# Patient Record
Sex: FEMALE | Race: WHITE | NOT HISPANIC OR LATINO | Employment: FULL TIME | URBAN - METROPOLITAN AREA
[De-identification: names, ages, dates, MRNs, and addresses within clinical notes are randomized per-mention and may not be internally consistent; named-entity substitution may affect disease eponyms.]

---

## 2017-04-11 ENCOUNTER — HOSPITAL ENCOUNTER (OUTPATIENT)
Dept: OTHER | Facility: HOSPITAL | Age: 22
Discharge: HOME | End: 2017-04-11
Attending: PHYSICIAN ASSISTANT | Admitting: PHYSICIAN ASSISTANT

## 2017-04-11 LAB
ALBUMIN SERPL-MCNC: 3.3 G/DL (ref 3.5–4.8)
ALBUMIN/GLOB SERPL: 1.1 {RATIO} (ref 1–1.7)
ALP SERPL-CCNC: 61 IU/L (ref 32–91)
ALT SERPL-CCNC: 36 IU/L (ref 14–54)
ANION GAP SERPL CALC-SCNC: 12.5 MMOL/L (ref 10–20)
AST SERPL-CCNC: 22 IU/L (ref 15–41)
BASOPHILS # BLD AUTO: 0.1 10*3/UL (ref 0–0.2)
BASOPHILS NFR BLD AUTO: 1 % (ref 0–2)
BILIRUB SERPL-MCNC: 0.4 MG/DL (ref 0.3–1.2)
BUN SERPL-MCNC: 11 MG/DL (ref 8–20)
BUN/CREAT SERPL: 22 (ref 5.4–26.2)
CALCIUM SERPL-MCNC: 9.6 MG/DL (ref 8.9–10.3)
CHLORIDE SERPL-SCNC: 106 MMOL/L (ref 101–111)
CHOLEST SERPL-MCNC: 155 MG/DL
CHOLEST/HDLC SERPL: 3.9 {RATIO}
CONV CO2: 26 MMOL/L (ref 22–32)
CONV LDL CHOLESTEROL DIRECT: 96 MG/DL (ref 0–100)
CONV TOTAL PROTEIN: 6.4 G/DL (ref 6.1–7.9)
CREAT UR-MCNC: 0.5 MG/DL (ref 0.4–1)
DIFFERENTIAL METHOD BLD: (no result)
EOSINOPHIL # BLD AUTO: 0.1 10*3/UL (ref 0–0.3)
EOSINOPHIL # BLD AUTO: 1 % (ref 0–3)
ERYTHROCYTE [DISTWIDTH] IN BLOOD BY AUTOMATED COUNT: 14.1 % (ref 11.5–14.5)
GLOBULIN UR ELPH-MCNC: 3.1 G/DL (ref 2.5–3.8)
GLUCOSE SERPL-MCNC: 88 MG/DL (ref 65–99)
HCT VFR BLD AUTO: 38.4 % (ref 35–49)
HDLC SERPL-MCNC: 40 MG/DL
HGB BLD-MCNC: 13 G/DL (ref 12–15)
LDLC/HDLC SERPL: 2.4 {RATIO}
LIPID INTERPRETATION: ABNORMAL
LYMPHOCYTES # BLD AUTO: 4.4 10*3/UL (ref 0.8–4.8)
LYMPHOCYTES NFR BLD AUTO: 35 % (ref 18–42)
MCH RBC QN AUTO: 27.5 PG (ref 26–32)
MCHC RBC AUTO-ENTMCNC: 33.9 G/DL (ref 32–36)
MCV RBC AUTO: 81 FL (ref 80–94)
MONOCYTES # BLD AUTO: 0.7 10*3/UL (ref 0.1–1.3)
MONOCYTES NFR BLD AUTO: 6 % (ref 2–11)
NEUTROPHILS # BLD AUTO: 7.2 10*3/UL (ref 2.3–8.6)
NEUTROPHILS NFR BLD AUTO: 57 % (ref 50–75)
NRBC BLD AUTO-RTO: 0 /100{WBCS}
NRBC/RBC NFR BLD MANUAL: 0 10*3/UL
PLATELET # BLD AUTO: 306 10*3/UL (ref 150–450)
PMV BLD AUTO: 9.2 FL (ref 7.4–10.4)
POTASSIUM SERPL-SCNC: 4.5 MMOL/L (ref 3.6–5.1)
RBC # BLD AUTO: 4.74 10*6/UL (ref 4–5.4)
SODIUM SERPL-SCNC: 140 MMOL/L (ref 136–144)
TRIGL SERPL-MCNC: 154 MG/DL
TSH SERPL-ACNC: 1.35 UIU/ML (ref 0.34–5.6)
VLDLC SERPL CALC-MCNC: 18.7 MG/DL
WBC # BLD AUTO: 12.6 10*3/UL (ref 4.5–11.5)

## 2017-04-12 ENCOUNTER — HOSPITAL ENCOUNTER (OUTPATIENT)
Dept: PREADMISSION TESTING | Facility: HOSPITAL | Age: 22
Discharge: HOME | End: 2017-04-12
Attending: PHYSICIAN ASSISTANT | Admitting: PHYSICIAN ASSISTANT

## 2018-01-04 ENCOUNTER — HOSPITAL ENCOUNTER (OUTPATIENT)
Dept: FAMILY MEDICINE CLINIC | Facility: CLINIC | Age: 23
Setting detail: SPECIMEN
Discharge: HOME | End: 2018-01-04
Attending: PHYSICIAN ASSISTANT | Admitting: PHYSICIAN ASSISTANT

## 2018-01-04 LAB
ALBUMIN SERPL-MCNC: 3.5 G/DL (ref 3.5–4.8)
ALBUMIN/GLOB SERPL: 1.2 {RATIO} (ref 1–1.7)
ALP SERPL-CCNC: 61 IU/L (ref 32–91)
ALT SERPL-CCNC: 52 IU/L (ref 14–54)
ANION GAP SERPL CALC-SCNC: 11.9 MMOL/L (ref 10–20)
AST SERPL-CCNC: 29 IU/L (ref 15–41)
BILIRUB SERPL-MCNC: 0.2 MG/DL (ref 0.3–1.2)
BUN SERPL-MCNC: 9 MG/DL (ref 8–20)
BUN/CREAT SERPL: 12.9 (ref 5.4–26.2)
CALCIUM SERPL-MCNC: 9.2 MG/DL (ref 8.9–10.3)
CHLORIDE SERPL-SCNC: 103 MMOL/L (ref 101–111)
CHOLEST SERPL-MCNC: 157 MG/DL
CHOLEST/HDLC SERPL: 3.8 {RATIO}
CONV CO2: 26 MMOL/L (ref 22–32)
CONV LDL CHOLESTEROL DIRECT: 94 MG/DL (ref 0–100)
CONV TOTAL PROTEIN: 6.5 G/DL (ref 6.1–7.9)
CREAT UR-MCNC: 0.7 MG/DL (ref 0.4–1)
GLOBULIN UR ELPH-MCNC: 3 G/DL (ref 2.5–3.8)
GLUCOSE SERPL-MCNC: 110 MG/DL (ref 65–99)
HCG UR QL: NEGATIVE
HDLC SERPL-MCNC: 42 MG/DL
LDLC/HDLC SERPL: 2.3 {RATIO}
LIPID INTERPRETATION: ABNORMAL
POTASSIUM SERPL-SCNC: 3.9 MMOL/L (ref 3.6–5.1)
SODIUM SERPL-SCNC: 137 MMOL/L (ref 136–144)
TRIGL SERPL-MCNC: 274 MG/DL
VLDLC SERPL CALC-MCNC: 20.8 MG/DL

## 2018-04-24 ENCOUNTER — HOSPITAL ENCOUNTER (OUTPATIENT)
Dept: FAMILY MEDICINE CLINIC | Facility: CLINIC | Age: 23
Setting detail: SPECIMEN
Discharge: HOME | End: 2018-04-24
Attending: PHYSICIAN ASSISTANT | Admitting: PHYSICIAN ASSISTANT

## 2018-04-25 LAB
BACTERIA SPEC AEROBE CULT: NORMAL
Lab: NORMAL
MICRO REPORT STATUS: NORMAL
SPECIMEN SOURCE: NORMAL

## 2019-07-15 ENCOUNTER — HOSPITAL ENCOUNTER (EMERGENCY)
Facility: HOSPITAL | Age: 24
Discharge: HOME | End: 2019-07-15
Attending: NURSE PRACTITIONER | Admitting: EMERGENCY MEDICINE

## 2019-07-15 VITALS
TEMPERATURE: 98.6 F | RESPIRATION RATE: 18 BRPM | HEART RATE: 98 BPM | BODY MASS INDEX: 44.41 KG/M2 | DIASTOLIC BLOOD PRESSURE: 97 MMHG | HEIGHT: 68 IN | WEIGHT: 293 LBS | OXYGEN SATURATION: 98 % | SYSTOLIC BLOOD PRESSURE: 144 MMHG

## 2019-07-15 DIAGNOSIS — H60.12 CELLULITIS OF ANTIHELIX OF LEFT EAR: ICD-10-CM

## 2019-07-15 DIAGNOSIS — H92.02 OTALGIA OF LEFT EAR: ICD-10-CM

## 2019-07-15 DIAGNOSIS — H60.312 DIFFUSE OTITIS EXTERNA OF LEFT EAR, UNSPECIFIED CHRONICITY: Primary | ICD-10-CM

## 2019-07-15 PROCEDURE — 99283 EMERGENCY DEPT VISIT LOW MDM: CPT

## 2019-07-15 RX ORDER — CEPHALEXIN 500 MG/1
500 CAPSULE ORAL 3 TIMES DAILY
Qty: 30 CAPSULE | Refills: 0 | OUTPATIENT
Start: 2019-07-15 | End: 2019-07-17

## 2019-07-15 RX ORDER — AMOXICILLIN 875 MG/1
875 TABLET, COATED ORAL 2 TIMES DAILY
COMMUNITY
End: 2019-07-15

## 2019-07-15 RX ORDER — TOBRAMYCIN AND DEXAMETHASONE 3; 1 MG/ML; MG/ML
2 SUSPENSION/ DROPS OPHTHALMIC
Status: DISCONTINUED | OUTPATIENT
Start: 2019-07-15 | End: 2019-07-16 | Stop reason: HOSPADM

## 2019-07-15 RX ORDER — NEOMYCIN SULFATE, POLYMYXIN B SULFATE AND BACITRACIN ZINC 3.5; 10000; 4 MG/G; [USP'U]/G; [USP'U]/G
OINTMENT OPHTHALMIC 4 TIMES DAILY
COMMUNITY
End: 2019-07-15

## 2019-07-15 RX ORDER — CIPROFLOXACIN AND DEXAMETHASONE 3; 1 MG/ML; MG/ML
4 SUSPENSION/ DROPS AURICULAR (OTIC) 2 TIMES DAILY
Status: DISCONTINUED | OUTPATIENT
Start: 2019-07-15 | End: 2019-07-15

## 2019-07-15 RX ORDER — ACETAMINOPHEN AND CODEINE PHOSPHATE 300; 30 MG/1; MG/1
1 TABLET ORAL EVERY 4 HOURS PRN
Qty: 10 TABLET | Refills: 0 | OUTPATIENT
Start: 2019-07-15 | End: 2019-07-17

## 2019-07-15 RX ADMIN — TOBRAMYCIN AND DEXAMETHASONE 2 DROP: 3; 1 SUSPENSION/ DROPS OPHTHALMIC at 23:07

## 2019-07-17 ENCOUNTER — HOSPITAL ENCOUNTER (EMERGENCY)
Facility: HOSPITAL | Age: 24
Discharge: HOME | End: 2019-07-17
Admitting: EMERGENCY MEDICINE

## 2019-07-17 VITALS
RESPIRATION RATE: 18 BRPM | HEIGHT: 68 IN | HEART RATE: 92 BPM | WEIGHT: 293 LBS | DIASTOLIC BLOOD PRESSURE: 80 MMHG | OXYGEN SATURATION: 97 % | SYSTOLIC BLOOD PRESSURE: 123 MMHG | BODY MASS INDEX: 44.41 KG/M2 | TEMPERATURE: 99 F

## 2019-07-17 DIAGNOSIS — H60.502 ACUTE OTITIS EXTERNA OF LEFT EAR, UNSPECIFIED TYPE: ICD-10-CM

## 2019-07-17 DIAGNOSIS — H66.92 ACUTE LEFT OTITIS MEDIA: Primary | ICD-10-CM

## 2019-07-17 PROCEDURE — 25010000002 CEFTRIAXONE PER 250 MG: Performed by: NURSE PRACTITIONER

## 2019-07-17 PROCEDURE — 96372 THER/PROPH/DIAG INJ SC/IM: CPT

## 2019-07-17 PROCEDURE — 99283 EMERGENCY DEPT VISIT LOW MDM: CPT

## 2019-07-17 RX ORDER — CEFTRIAXONE 1 G/1
1000 INJECTION, POWDER, FOR SOLUTION INTRAMUSCULAR; INTRAVENOUS ONCE
Status: COMPLETED | OUTPATIENT
Start: 2019-07-17 | End: 2019-07-17

## 2019-07-17 RX ORDER — LIDOCAINE HYDROCHLORIDE 10 MG/ML
2.1 INJECTION, SOLUTION EPIDURAL; INFILTRATION; INTRACAUDAL; PERINEURAL ONCE
Status: COMPLETED | OUTPATIENT
Start: 2019-07-17 | End: 2019-07-17

## 2019-07-17 RX ORDER — TRAMADOL HYDROCHLORIDE 50 MG/1
50 TABLET ORAL EVERY 4 HOURS PRN
Qty: 8 TABLET | Refills: 0 | OUTPATIENT
Start: 2019-07-17 | End: 2020-11-05

## 2019-07-17 RX ORDER — AMOXICILLIN AND CLAVULANATE POTASSIUM 875; 125 MG/1; MG/1
1 TABLET, FILM COATED ORAL 2 TIMES DAILY
Qty: 20 TABLET | Refills: 0 | OUTPATIENT
Start: 2019-07-17 | End: 2020-11-05

## 2019-07-17 RX ADMIN — CEFTRIAXONE SODIUM 1000 MG: 1 INJECTION, POWDER, FOR SOLUTION INTRAMUSCULAR; INTRAVENOUS at 13:50

## 2019-07-17 RX ADMIN — LIDOCAINE HYDROCHLORIDE 2 ML: 10 INJECTION, SOLUTION EPIDURAL; INFILTRATION; INTRACAUDAL; PERINEURAL at 13:50

## 2019-07-26 ENCOUNTER — OFFICE VISIT (OUTPATIENT)
Dept: FAMILY MEDICINE CLINIC | Facility: CLINIC | Age: 24
End: 2019-07-26

## 2019-07-26 ENCOUNTER — TELEPHONE (OUTPATIENT)
Dept: FAMILY MEDICINE CLINIC | Facility: CLINIC | Age: 24
End: 2019-07-26

## 2019-07-26 ENCOUNTER — APPOINTMENT (OUTPATIENT)
Dept: LAB | Facility: HOSPITAL | Age: 24
End: 2019-07-26

## 2019-07-26 VITALS
OXYGEN SATURATION: 97 % | WEIGHT: 293 LBS | TEMPERATURE: 97.4 F | SYSTOLIC BLOOD PRESSURE: 135 MMHG | BODY MASS INDEX: 44.41 KG/M2 | DIASTOLIC BLOOD PRESSURE: 84 MMHG | RESPIRATION RATE: 20 BRPM | HEIGHT: 68 IN | HEART RATE: 88 BPM

## 2019-07-26 DIAGNOSIS — N93.9 ABNORMAL VAGINAL BLEEDING: ICD-10-CM

## 2019-07-26 DIAGNOSIS — H60.92 OTITIS EXTERNA OF LEFT EAR, UNSPECIFIED CHRONICITY, UNSPECIFIED TYPE: ICD-10-CM

## 2019-07-26 DIAGNOSIS — N63.0 LUMP IN FEMALE BREAST: Primary | ICD-10-CM

## 2019-07-26 LAB — B-HCG UR QL: NEGATIVE

## 2019-07-26 PROCEDURE — 81025 URINE PREGNANCY TEST: CPT | Performed by: NURSE PRACTITIONER

## 2019-07-26 PROCEDURE — 99213 OFFICE O/P EST LOW 20 MIN: CPT | Performed by: NURSE PRACTITIONER

## 2019-07-26 NOTE — PROGRESS NOTES
"Mariano Ash is a 24 y.o. female.     Chief Complaint   Patient presents with   • Breast Mass     Found lump in left breast.   • Vaginal Bleeding     Says \"pink\"  vaginal bleeding. Off period 2 wks ago. Unusual for her to have add'l period. last time was pregnant.        HPI  Found breast lump left over one week ago. Never had this before. No redness to the area.   Has PCOS and had period 2 weeks ago and now having vaginal bleeding. She did not do a pregnancy test. Its very hard for her to get pregnant. She is having unprotected sex.   Wants ear reevaluated recently treated with antibioitics.     The following portions of the patient's history were reviewed and updated as appropriate: allergies, current medications, past family history, past medical history, past social history, past surgical history and problem list.      Current Outpatient Medications:   •  amoxicillin-clavulanate (AUGMENTIN) 875-125 MG per tablet, Take 1 tablet by mouth 2 (Two) Times a Day., Disp: 20 tablet, Rfl: 0  •  neomycin-polymyxin-hydrocortisone (CORTISPORIN) 3.5-76001-9 otic solution, Administer 3 drops into the left ear 4 (Four) Times a Day., Disp: 10 mL, Rfl: 0  •  traMADol (ULTRAM) 50 MG tablet, Take 1 tablet by mouth Every 4 (Four) Hours As Needed for Moderate Pain ., Disp: 8 tablet, Rfl: 0    Recent Results (from the past 4032 hour(s))   POCT URINALYSIS DIPSTICK, AUTOMATED    Collection Time: 03/26/19 11:22 AM   Result Value Ref Range    Color, UA      Appearance, Fluid  Clear, Slightly Cloudy    Specific Gravity, UA 1.015 1.000 - 1.030    pH, UA 5 5 - 8    Leukocytes, UA 75 Karen/ul (A) Negative    Nitrite, UA Negative Negative    Total Protein, urine Trace Negative, Trace    Glucose Normal Normal    External Ketones, Urine Negative Negative    Urobilinogen, UA Normal Normal mg/dL    External Bilirubin, Urine Negative Negative    Blood, UA Negative Negative    WBC, UA  0 - 3 hpf    RBC, UA  0 - 2 hpf    Epithelial " "Casts      Bacteria, UA  None         Review of Systems   Constitutional: Negative for chills and fever.   HENT: Positive for ear pain. Negative for congestion and sinus pressure.         Recently treated with antibioitics for exertnal OM and inner ear infection   Eyes: Negative for blurred vision and pain.   Respiratory: Negative for cough and shortness of breath.    Cardiovascular: Negative for chest pain and leg swelling.   Gastrointestinal: Negative for abdominal pain, nausea and indigestion.   Endocrine: Negative for cold intolerance, heat intolerance, polydipsia, polyphagia and polyuria.   Genitourinary: Positive for breast lump.   Skin: Negative for dry skin, rash and bruise.   Psychiatric/Behavioral: Negative for dysphoric mood and stress.       Objective     /84 (BP Location: Left arm, Patient Position: Sitting, Cuff Size: Large Adult)   Pulse 88   Temp 97.4 °F (36.3 °C) (Oral)   Resp 20   Ht 172.7 cm (68\")   Wt (!) 146 kg (320 lb 14.4 oz)   LMP  (LMP Unknown) Comment: periods irregular from pcos  SpO2 97%   BMI 48.79 kg/m²     Physical Exam   Constitutional: She is oriented to person, place, and time. She appears well-developed and well-nourished.   HENT:   Head: Normocephalic and atraumatic.   Right Ear: Hearing, tympanic membrane, external ear and ear canal normal.   Left Ear: Hearing and tympanic membrane normal. There is tenderness.   Eyes: Conjunctivae and EOM are normal. Pupils are equal, round, and reactive to light.   Neck: Normal range of motion. Neck supple.   Cardiovascular: Normal rate, regular rhythm, normal heart sounds and intact distal pulses.   Pulmonary/Chest: Effort normal and breath sounds normal.   Abdominal: Soft. Bowel sounds are normal.   Musculoskeletal: Normal range of motion.   Neurological: She is alert and oriented to person, place, and time.   Skin: Skin is warm and dry.        Psychiatric: She has a normal mood and affect. Her behavior is normal.   Nursing note " and vitals reviewed.        Assessment/Plan   Brandi was seen today for breast mass and vaginal bleeding.    Diagnoses and all orders for this visit:    Lump in female breast  Comments:  left breast 10 oclock palpable mass non painful  Orders:  -     Cancel: Mammo Diagnostic Digital Tomosynthesis Left With CAD; Future  -     Mammo Diagnostic Digital Tomosynthesis Left With CAD    Abnormal vaginal bleeding  -     POCT pregnancy, urine    Otitis externa of left ear, unspecified chronicity, unspecified type      Patient Instructions   Follow up after testing  Continue ear drops   Schedule the mammogram and ultrasound if needed.       Jade Whitlock, APRN    07/26/19

## 2019-08-02 ENCOUNTER — HOSPITAL ENCOUNTER (OUTPATIENT)
Dept: MAMMOGRAPHY | Facility: HOSPITAL | Age: 24
End: 2019-08-02

## 2019-08-02 ENCOUNTER — DOCUMENTATION (OUTPATIENT)
Dept: FAMILY MEDICINE CLINIC | Facility: CLINIC | Age: 24
End: 2019-08-02

## 2019-08-02 ENCOUNTER — HOSPITAL ENCOUNTER (OUTPATIENT)
Dept: ULTRASOUND IMAGING | Facility: HOSPITAL | Age: 24
Discharge: HOME | End: 2019-08-02
Admitting: NURSE PRACTITIONER

## 2019-08-02 DIAGNOSIS — N63.0 LUMP IN FEMALE BREAST: ICD-10-CM

## 2019-08-02 PROCEDURE — 76642 ULTRASOUND BREAST LIMITED: CPT

## 2019-10-27 PROCEDURE — 99283 EMERGENCY DEPT VISIT LOW MDM: CPT

## 2019-10-28 ENCOUNTER — HOSPITAL ENCOUNTER (EMERGENCY)
Facility: HOSPITAL | Age: 24
Discharge: HOME | End: 2019-10-28
Attending: EMERGENCY MEDICINE | Admitting: EMERGENCY MEDICINE

## 2019-10-28 VITALS
RESPIRATION RATE: 18 BRPM | DIASTOLIC BLOOD PRESSURE: 88 MMHG | BODY MASS INDEX: 44.41 KG/M2 | WEIGHT: 293 LBS | SYSTOLIC BLOOD PRESSURE: 132 MMHG | HEART RATE: 88 BPM | TEMPERATURE: 97.8 F | HEIGHT: 68 IN | OXYGEN SATURATION: 97 %

## 2019-10-28 DIAGNOSIS — R10.2 PELVIC PAIN: ICD-10-CM

## 2019-10-28 DIAGNOSIS — N93.9 VAGINAL BLEEDING: Primary | ICD-10-CM

## 2019-10-28 LAB
BASOPHILS # BLD AUTO: 0.2 10*3/MM3 (ref 0–0.2)
BASOPHILS NFR BLD AUTO: 1.3 % (ref 0–1.5)
DEPRECATED RDW RBC AUTO: 42 FL (ref 37–54)
EOSINOPHIL # BLD AUTO: 0.1 10*3/MM3 (ref 0–0.4)
EOSINOPHIL NFR BLD AUTO: 0.9 % (ref 0.3–6.2)
ERYTHROCYTE [DISTWIDTH] IN BLOOD BY AUTOMATED COUNT: 14.3 % (ref 12.3–15.4)
HCG INTACT+B SERPL-ACNC: <0.5 MIU/ML
HCT VFR BLD AUTO: 40.8 % (ref 34–46.6)
HGB BLD-MCNC: 13.6 G/DL (ref 12–15.9)
LYMPHOCYTES # BLD AUTO: 6.1 10*3/MM3 (ref 0.7–3.1)
LYMPHOCYTES NFR BLD AUTO: 38.8 % (ref 19.6–45.3)
MCH RBC QN AUTO: 27.8 PG (ref 26.6–33)
MCHC RBC AUTO-ENTMCNC: 33.4 G/DL (ref 31.5–35.7)
MCV RBC AUTO: 83.3 FL (ref 79–97)
MONOCYTES # BLD AUTO: 0.9 10*3/MM3 (ref 0.1–0.9)
MONOCYTES NFR BLD AUTO: 5.8 % (ref 5–12)
NEUTROPHILS # BLD AUTO: 8.4 10*3/MM3 (ref 1.7–7)
NEUTROPHILS NFR BLD AUTO: 53.2 % (ref 42.7–76)
NRBC BLD AUTO-RTO: 0 /100 WBC (ref 0–0.2)
PLATELET # BLD AUTO: 324 10*3/MM3 (ref 140–450)
PMV BLD AUTO: 8.7 FL (ref 6–12)
RBC # BLD AUTO: 4.9 10*6/MM3 (ref 3.77–5.28)
WBC NRBC COR # BLD: 15.8 10*3/MM3 (ref 3.4–10.8)

## 2019-10-28 PROCEDURE — 84702 CHORIONIC GONADOTROPIN TEST: CPT | Performed by: EMERGENCY MEDICINE

## 2019-10-28 PROCEDURE — 85025 COMPLETE CBC W/AUTO DIFF WBC: CPT | Performed by: EMERGENCY MEDICINE

## 2019-10-28 RX ORDER — NAPROXEN 375 MG/1
375 TABLET ORAL
Qty: 15 TABLET | Refills: 0 | OUTPATIENT
Start: 2019-10-28 | End: 2020-11-05

## 2020-07-08 ENCOUNTER — TELEPHONE (OUTPATIENT)
Dept: FAMILY MEDICINE CLINIC | Facility: CLINIC | Age: 25
End: 2020-07-08

## 2020-07-08 NOTE — TELEPHONE ENCOUNTER
The patient would like a referral to see a psychologist. She believes she needs some medication to help with issues she is having but refuses to go to an inpatient facility.

## 2020-07-09 NOTE — TELEPHONE ENCOUNTER
She needs appointment to be seen. She can check her insurance to see who is covered. For immediate care go to [facility]  She can go to [facility] or [facility] as well.

## 2020-11-05 ENCOUNTER — HOSPITAL ENCOUNTER (EMERGENCY)
Facility: HOSPITAL | Age: 25
Discharge: HOME | End: 2020-11-05
Admitting: EMERGENCY MEDICINE

## 2020-11-05 ENCOUNTER — APPOINTMENT (OUTPATIENT)
Dept: GENERAL RADIOLOGY | Facility: HOSPITAL | Age: 25
End: 2020-11-05

## 2020-11-05 VITALS
BODY MASS INDEX: 44.41 KG/M2 | DIASTOLIC BLOOD PRESSURE: 81 MMHG | HEART RATE: 89 BPM | RESPIRATION RATE: 16 BRPM | TEMPERATURE: 98 F | WEIGHT: 293 LBS | SYSTOLIC BLOOD PRESSURE: 137 MMHG | OXYGEN SATURATION: 99 % | HEIGHT: 68 IN

## 2020-11-05 DIAGNOSIS — M25.571 ACUTE RIGHT ANKLE PAIN: ICD-10-CM

## 2020-11-05 DIAGNOSIS — S93.401A SPRAIN OF RIGHT ANKLE, UNSPECIFIED LIGAMENT, INITIAL ENCOUNTER: Primary | ICD-10-CM

## 2020-11-05 DIAGNOSIS — S90.511A ABRASION OF RIGHT ANKLE, INITIAL ENCOUNTER: ICD-10-CM

## 2020-11-05 PROCEDURE — 73630 X-RAY EXAM OF FOOT: CPT

## 2020-11-05 PROCEDURE — 99283 EMERGENCY DEPT VISIT LOW MDM: CPT

## 2020-11-05 PROCEDURE — 73610 X-RAY EXAM OF ANKLE: CPT

## 2020-11-05 RX ORDER — ACETAMINOPHEN 500 MG
1000 TABLET ORAL ONCE
Status: COMPLETED | OUTPATIENT
Start: 2020-11-05 | End: 2020-11-05

## 2020-11-05 RX ADMIN — ACETAMINOPHEN 1000 MG: 500 TABLET, FILM COATED ORAL at 14:45

## 2020-11-05 NOTE — ED NOTES
"Wound cleaned with sterile normal saline soaked 4x4s and wrapped with one kerlix and one 6\" ACE wrap.     Janes Alcala  11/05/20 4760    "

## 2020-11-05 NOTE — DISCHARGE INSTRUCTIONS
Return to the ER for any worsening symptoms.  Alternate ice and heat at home with helpful to keep it elevated.  May alternate Motrin Tylenol every 4 hours for pain and may take up to a total of 4000 mg in 24 hours for Tylenol

## 2020-11-05 NOTE — ED PROVIDER NOTES
Subjective   History:  Patient is 25-year-old female presents to the ER with right ankle pain.  She reports that she was standing outside in a plastic pool and her foot went through it and she twisted her ankle.  Also reports she has some cuts on her ankle on her bottom of her foot.  She reports her tetanus is up-to-date.  She is unable to bear weight on her right ankle she reports.  Has not taken anything for it.     Onset: 1 day  Location: Ankle  Duration: Constant  Character: Sharp pain and abrasions  Aggravating/Alleviating factors: None  Radiation none  Severity: Moderate            Review of Systems   Constitutional: Negative for chills, diaphoresis, fatigue and fever.   Respiratory: Negative for cough, choking and shortness of breath.    Cardiovascular: Negative for chest pain.   Gastrointestinal: Negative for abdominal pain, nausea and vomiting.   Genitourinary: Negative.    Musculoskeletal:        Right ankle pain and abrasions   Skin: Negative.    Neurological: Negative.    Psychiatric/Behavioral: Negative.        Past Medical History:   Diagnosis Date   • PCOS (polycystic ovarian syndrome)        No Known Allergies    Past Surgical History:   Procedure Laterality Date   •  SECTION     • CYSTOSCOPY W/ LASER LITHOTRIPSY     • TONSILLECTOMY         Family History   Problem Relation Age of Onset   • Hypertension Mother        Social History     Socioeconomic History   • Marital status:      Spouse name: Not on file   • Number of children: Not on file   • Years of education: Not on file   • Highest education level: Not on file   Tobacco Use   • Smoking status: Current Every Day Smoker     Packs/day: 0.50   Substance and Sexual Activity   • Alcohol use: Yes     Alcohol/week: 3.0 standard drinks     Types: 3 Cans of beer per week   • Drug use: No   • Sexual activity: Yes     Partners: Male           Objective   Physical Exam  Vitals signs and nursing note reviewed.   Constitutional:        Appearance: She is well-developed.   HENT:      Head: Normocephalic and atraumatic.      Nose: Nose normal.   Eyes:      Pupils: Pupils are equal, round, and reactive to light.   Neck:      Musculoskeletal: Normal range of motion.   Pulmonary:      Effort: Pulmonary effort is normal.   Musculoskeletal: Normal range of motion.      Comments: Right ankle: Edematous lateral malleolus.  Increase in pain with plantar flexion and extension against resistance.  Pulses 2+.  Abrasions noted to right lateral ankle.  Abrasion also noted to right great toe plantar surface   Skin:     General: Skin is warm and dry.   Neurological:      General: No focal deficit present.      Mental Status: She is alert and oriented to person, place, and time.   Psychiatric:         Mood and Affect: Mood normal.         Behavior: Behavior normal.         Thought Content: Thought content normal.         Judgment: Judgment normal.         Procedures           ED Course      Xr Ankle 3+ View Right    Result Date: 11/5/2020  Right ankle soft tissue swelling. No acute osseous abnormalities.  Electronically Signed By-Dr. Gunjan Lancaster MD On:11/5/2020 2:29 PM This report was finalized on [id number] by Dr. Gunjan Lancaster MD.    Xr Foot 3+ View Right    Result Date: 11/5/2020  No acute right foot findings.  Electronically Signed By-Dr. Gunjan Lancaster MD On:11/5/2020 2:30 PM This report was finalized on [id number] by Dr. Gunjan Lancaster MD.    Labs Reviewed - No data to display  Medications   acetaminophen (TYLENOL) tablet 1,000 mg (1,000 mg Oral Given 11/5/20 1445)                                          MDM  Number of Diagnoses or Management Options  Abrasion of right ankle, initial encounter:   Acute right ankle pain:   Sprain of right ankle, unspecified ligament, initial encounter:   Diagnosis management comments: I examined the patient using the appropriate personal protective equipment.      DISPOSITION:   Chart Review:  Comorbidity:  has a  past medical history of PCOS (polycystic ovarian syndrome).  Differentials:this list is not all inclusive and does not constitute the entirety of considered causes --> acute fracture versus sprain    Imaging: Was interpreted by physician and reviewed by myself:  Xr Ankle 3+ View Right    Result Date: 11/5/2020  Right ankle soft tissue swelling. No acute osseous abnormalities.  Electronically Signed By-Dr. Gunjan Lancaster MD On:11/5/2020 2:29 PM This report was finalized on [id number] by Dr. Gunjan Lancaster MD.    Xr Foot 3+ View Right    Result Date: 11/5/2020  No acute right foot findings.  Electronically Signed By-Dr. Gunjan Lancaster MD On:11/5/2020 2:30 PM This report was finalized on [id number] by Dr. Gunjan Lancaster MD.      Disposition/Treatment:    Patient is a 25-year-old female presents to the ER with right ankle pain x-ray was negative.  Ankle was cleaned her abrasions were fairly shallow I do not think they would benefit from sutures.  She reports her tetanus is up-to-date she was offered crutches she was discharged home in stable condition return precautions and follow-up instructions were provided       Amount and/or Complexity of Data Reviewed  Tests in the radiology section of CPT®: reviewed    Patient Progress  Patient progress: stable      Final diagnoses:   Sprain of right ankle, unspecified ligament, initial encounter   Acute right ankle pain   Abrasion of right ankle, initial encounter            Radha Calvert PA-C  11/05/20 0297

## 2021-06-16 ENCOUNTER — APPOINTMENT (OUTPATIENT)
Dept: GENERAL RADIOLOGY | Facility: HOSPITAL | Age: 26
End: 2021-06-16

## 2021-06-16 ENCOUNTER — HOSPITAL ENCOUNTER (EMERGENCY)
Facility: HOSPITAL | Age: 26
Discharge: HOME | End: 2021-06-16
Admitting: EMERGENCY MEDICINE

## 2021-06-16 VITALS
DIASTOLIC BLOOD PRESSURE: 94 MMHG | HEIGHT: 68 IN | TEMPERATURE: 98.5 F | HEART RATE: 94 BPM | WEIGHT: 293 LBS | BODY MASS INDEX: 44.41 KG/M2 | OXYGEN SATURATION: 97 % | RESPIRATION RATE: 18 BRPM | SYSTOLIC BLOOD PRESSURE: 137 MMHG

## 2021-06-16 DIAGNOSIS — M54.32 SCIATICA OF LEFT SIDE: Primary | ICD-10-CM

## 2021-06-16 PROCEDURE — 96372 THER/PROPH/DIAG INJ SC/IM: CPT

## 2021-06-16 PROCEDURE — 72110 X-RAY EXAM L-2 SPINE 4/>VWS: CPT

## 2021-06-16 PROCEDURE — 99283 EMERGENCY DEPT VISIT LOW MDM: CPT

## 2021-06-16 PROCEDURE — 25010000002 KETOROLAC TROMETHAMINE PER 15 MG: Performed by: NURSE PRACTITIONER

## 2021-06-16 RX ORDER — KETOROLAC TROMETHAMINE 30 MG/ML
60 INJECTION, SOLUTION INTRAMUSCULAR; INTRAVENOUS ONCE
Status: COMPLETED | OUTPATIENT
Start: 2021-06-16 | End: 2021-06-16

## 2021-06-16 RX ORDER — CYCLOBENZAPRINE HCL 10 MG
10 TABLET ORAL ONCE
Status: COMPLETED | OUTPATIENT
Start: 2021-06-16 | End: 2021-06-16

## 2021-06-16 RX ORDER — CYCLOBENZAPRINE HCL 10 MG
10 TABLET ORAL 3 TIMES DAILY PRN
Qty: 15 TABLET | Refills: 0 | Status: SHIPPED | OUTPATIENT
Start: 2021-06-16 | End: 2021-09-14

## 2021-06-16 RX ORDER — METHYLPREDNISOLONE 4 MG/1
TABLET ORAL
Qty: 21 TABLET | Refills: 0 | Status: SHIPPED | OUTPATIENT
Start: 2021-06-16 | End: 2021-09-14

## 2021-06-16 RX ADMIN — KETOROLAC TROMETHAMINE 60 MG: 30 INJECTION, SOLUTION INTRAMUSCULAR at 13:05

## 2021-06-16 RX ADMIN — CYCLOBENZAPRINE HYDROCHLORIDE 10 MG: 10 TABLET, FILM COATED ORAL at 13:05

## 2021-06-16 NOTE — DISCHARGE INSTRUCTIONS
Take your steroid pack as ordered and to completion.  Use the Flexeril 3 times daily as needed for back pain.  Return to the emergency department for any new or worsening symptoms.  Call your primary care doctor today to schedule a follow-up and possible referral for physical therapy.

## 2021-06-16 NOTE — ED NOTES
Pt states she has had lower back pain radiating down her left leg since Friday. Pt denies any injury. No numbness or tingling in leg. No loss of bowel or bladder control. Pt is able to ambulate but states it hurts. Pt has never been told she has sciatica.      Laurita Wise, RN  06/16/21 3851

## 2021-09-10 ENCOUNTER — APPOINTMENT (OUTPATIENT)
Dept: GENERAL RADIOLOGY | Facility: HOSPITAL | Age: 26
End: 2021-09-10

## 2021-09-10 ENCOUNTER — HOSPITAL ENCOUNTER (EMERGENCY)
Facility: HOSPITAL | Age: 26
Discharge: HOME | End: 2021-09-10
Admitting: EMERGENCY MEDICINE

## 2021-09-10 VITALS
HEART RATE: 87 BPM | BODY MASS INDEX: 43.4 KG/M2 | WEIGHT: 293 LBS | DIASTOLIC BLOOD PRESSURE: 87 MMHG | TEMPERATURE: 98.3 F | OXYGEN SATURATION: 99 % | SYSTOLIC BLOOD PRESSURE: 136 MMHG | RESPIRATION RATE: 15 BRPM | HEIGHT: 69 IN

## 2021-09-10 DIAGNOSIS — R06.02 SHORTNESS OF BREATH: Primary | ICD-10-CM

## 2021-09-10 LAB
ALBUMIN SERPL-MCNC: 4.2 G/DL (ref 3.5–5.2)
ALBUMIN/GLOB SERPL: 1.4 G/DL
ALP SERPL-CCNC: 68 U/L (ref 39–117)
ALT SERPL W P-5'-P-CCNC: 27 U/L (ref 1–33)
ANION GAP SERPL CALCULATED.3IONS-SCNC: 12 MMOL/L (ref 5–15)
AST SERPL-CCNC: 18 U/L (ref 1–32)
BASOPHILS # BLD AUTO: 0 10*3/MM3 (ref 0–0.2)
BASOPHILS NFR BLD AUTO: 1 % (ref 0–1.5)
BILIRUB SERPL-MCNC: 0.3 MG/DL (ref 0–1.2)
BUN SERPL-MCNC: 9 MG/DL (ref 6–20)
BUN/CREAT SERPL: 12.7 (ref 7–25)
CALCIUM SPEC-SCNC: 8.9 MG/DL (ref 8.6–10.5)
CHLORIDE SERPL-SCNC: 102 MMOL/L (ref 98–107)
CO2 SERPL-SCNC: 25 MMOL/L (ref 22–29)
CREAT SERPL-MCNC: 0.71 MG/DL (ref 0.57–1)
D DIMER PPP FEU-MCNC: 0.45 MG/L (FEU) (ref 0–0.59)
D-LACTATE SERPL-SCNC: 1.1 MMOL/L (ref 0.5–2)
DEPRECATED RDW RBC AUTO: 42.9 FL (ref 37–54)
EOSINOPHIL # BLD AUTO: 0 10*3/MM3 (ref 0–0.4)
EOSINOPHIL NFR BLD AUTO: 0.3 % (ref 0.3–6.2)
ERYTHROCYTE [DISTWIDTH] IN BLOOD BY AUTOMATED COUNT: 15.3 % (ref 12.3–15.4)
GFR SERPL CREATININE-BSD FRML MDRD: 99 ML/MIN/1.73
GLOBULIN UR ELPH-MCNC: 3.1 GM/DL
GLUCOSE SERPL-MCNC: 108 MG/DL (ref 65–99)
HCT VFR BLD AUTO: 42.4 % (ref 34–46.6)
HGB BLD-MCNC: 14.2 G/DL (ref 12–15.9)
HOLD SPECIMEN: NORMAL
HOLD SPECIMEN: NORMAL
LYMPHOCYTES # BLD AUTO: 1.6 10*3/MM3 (ref 0.7–3.1)
LYMPHOCYTES NFR BLD AUTO: 33.5 % (ref 19.6–45.3)
MCH RBC QN AUTO: 27.2 PG (ref 26.6–33)
MCHC RBC AUTO-ENTMCNC: 33.6 G/DL (ref 31.5–35.7)
MCV RBC AUTO: 80.9 FL (ref 79–97)
MONOCYTES # BLD AUTO: 0.5 10*3/MM3 (ref 0.1–0.9)
MONOCYTES NFR BLD AUTO: 11.4 % (ref 5–12)
NEUTROPHILS NFR BLD AUTO: 2.5 10*3/MM3 (ref 1.7–7)
NEUTROPHILS NFR BLD AUTO: 53.8 % (ref 42.7–76)
NRBC BLD AUTO-RTO: 0.1 /100 WBC (ref 0–0.2)
PLATELET # BLD AUTO: 246 10*3/MM3 (ref 140–450)
PMV BLD AUTO: 8.8 FL (ref 6–12)
POTASSIUM SERPL-SCNC: 4 MMOL/L (ref 3.5–5.2)
PROT SERPL-MCNC: 7.3 G/DL (ref 6–8.5)
RBC # BLD AUTO: 5.24 10*6/MM3 (ref 3.77–5.28)
SODIUM SERPL-SCNC: 139 MMOL/L (ref 136–145)
TROPONIN T SERPL-MCNC: <0.01 NG/ML (ref 0–0.03)
WBC # BLD AUTO: 4.6 10*3/MM3 (ref 3.4–10.8)
WHOLE BLOOD HOLD SPECIMEN: NORMAL

## 2021-09-10 PROCEDURE — 99283 EMERGENCY DEPT VISIT LOW MDM: CPT

## 2021-09-10 PROCEDURE — 85379 FIBRIN DEGRADATION QUANT: CPT | Performed by: NURSE PRACTITIONER

## 2021-09-10 PROCEDURE — 83605 ASSAY OF LACTIC ACID: CPT

## 2021-09-10 PROCEDURE — 85025 COMPLETE CBC W/AUTO DIFF WBC: CPT

## 2021-09-10 PROCEDURE — 84484 ASSAY OF TROPONIN QUANT: CPT | Performed by: NURSE PRACTITIONER

## 2021-09-10 PROCEDURE — 80053 COMPREHEN METABOLIC PANEL: CPT

## 2021-09-10 PROCEDURE — 93005 ELECTROCARDIOGRAM TRACING: CPT

## 2021-09-10 PROCEDURE — 87040 BLOOD CULTURE FOR BACTERIA: CPT

## 2021-09-10 PROCEDURE — 71045 X-RAY EXAM CHEST 1 VIEW: CPT

## 2021-09-10 RX ORDER — SODIUM CHLORIDE 9 MG/ML
30 INJECTION, SOLUTION INTRAVENOUS ONCE
Status: DISCONTINUED | OUTPATIENT
Start: 2021-09-10 | End: 2021-09-10

## 2021-09-10 RX ORDER — DIPHENHYDRAMINE HYDROCHLORIDE 50 MG/ML
50 INJECTION INTRAMUSCULAR; INTRAVENOUS ONCE AS NEEDED
Status: DISCONTINUED | OUTPATIENT
Start: 2021-09-10 | End: 2021-09-10

## 2021-09-10 RX ORDER — METHYLPREDNISOLONE SODIUM SUCCINATE 125 MG/2ML
125 INJECTION, POWDER, LYOPHILIZED, FOR SOLUTION INTRAMUSCULAR; INTRAVENOUS ONCE AS NEEDED
Status: DISCONTINUED | OUTPATIENT
Start: 2021-09-10 | End: 2021-09-10

## 2021-09-10 RX ORDER — EPINEPHRINE 1 MG/ML
0.3 INJECTION, SOLUTION, CONCENTRATE INTRAVENOUS ONCE AS NEEDED
Status: DISCONTINUED | OUTPATIENT
Start: 2021-09-10 | End: 2021-09-10

## 2021-09-10 RX ORDER — SODIUM CHLORIDE 0.9 % (FLUSH) 0.9 %
10 SYRINGE (ML) INJECTION AS NEEDED
Status: DISCONTINUED | OUTPATIENT
Start: 2021-09-10 | End: 2021-09-10 | Stop reason: HOSPADM

## 2021-09-10 NOTE — DISCHARGE INSTRUCTIONS
Continue and finish quarantine as instructed.  Symptomatic treatment such as Tylenol for pain or fever.  Mucinex for congestion.  Drink plenty of fluids.  Follow-up with your primary care provider.  Return for new or worsening symptoms.

## 2021-09-10 NOTE — ED PROVIDER NOTES
Subjective   Patient is a 26-year-old obese white female with history of PCOS who presents from home today with complaints of shortness of breath central chest pain and tightness with coughing.  She states she tested positive for Covid 3 days ago.  She reports a dry nonproductive cough.  She states she has had some nausea and an episode of vomiting with a few episodes of loose nonbloody stools.  She states her daughter is ill with similar symptoms as well.  Patient states her symptoms started 3 days ago the day she was tested.  States she did not receive Covid vaccine.  She denies any leg pain or swelling.  She does report a low-grade fever.          Review of Systems   Constitutional: Positive for chills and fever.   HENT: Positive for congestion.    Respiratory: Positive for cough, chest tightness and shortness of breath.    Cardiovascular: Positive for chest pain. Negative for leg swelling.   Gastrointestinal: Positive for diarrhea, nausea and vomiting. Negative for abdominal pain.   Genitourinary: Negative for decreased urine volume.   Musculoskeletal: Negative for neck pain and neck stiffness.   Skin: Negative for rash.       Past Medical History:   Diagnosis Date   • PCOS (polycystic ovarian syndrome)        No Known Allergies    Past Surgical History:   Procedure Laterality Date   •  SECTION     • CYSTOSCOPY W/ LASER LITHOTRIPSY     • TONSILLECTOMY         Family History   Problem Relation Age of Onset   • Hypertension Mother        Social History     Socioeconomic History   • Marital status:      Spouse name: Not on file   • Number of children: Not on file   • Years of education: Not on file   • Highest education level: Not on file   Tobacco Use   • Smoking status: Current Every Day Smoker     Packs/day: 0.50   Substance and Sexual Activity   • Alcohol use: Yes     Alcohol/week: 3.0 standard drinks     Types: 3 Cans of beer per week   • Drug use: No   • Sexual activity: Yes     Partners: Male            Objective   Physical Exam  Vital signs and triage nurse note reviewed.  Constitutional: Awake, alert; well-developed and well-nourished. No acute distress is noted.  Obese.  HEENT: Normocephalic, atraumatic; pupils are PERRL with intact EOM; oropharynx is pink and moist without exudate or erythema.  No drooling or pooling of oral secretions.  Neck: Supple, full range of motion without pain; no cervical lymphadenopathy. Normal phonation.  Cardiovascular: Regular rate and rhythm, normal S1-S2.  No murmur noted.  Pulmonary: Respiratory effort regular nonlabored, breath sounds clear to auscultation all fields.  Abdomen: Soft, nontender, nondistended with normoactive bowel sounds; no rebound or guarding.  Musculoskeletal: Independent range of motion of all extremities with no palpable tenderness or edema.  Neuro: Alert oriented x3, speech is clear and appropriate, GCS 15.    Skin: Flesh tone, warm, dry, intact; no erythematous or petechial rash or lesion.      Procedures           ED Course      Labs Reviewed   COMPREHENSIVE METABOLIC PANEL - Abnormal; Notable for the following components:       Result Value    Glucose 108 (*)     All other components within normal limits    Narrative:     GFR Normal >60  Chronic Kidney Disease <60  Kidney Failure <15     CBC WITH AUTO DIFFERENTIAL - Normal   TROPONIN (IN-HOUSE) - Normal    Narrative:     Troponin T Reference Range:  <= 0.03 ng/mL-   Negative for AMI  >0.03 ng/mL-     Abnormal for myocardial necrosis.  Clinicians would have to utilize clinical acumen, EKG, Troponin and serial changes to determine if it is an Acute Myocardial Infarction or myocardial injury due to an underlying chronic condition.       Results may be falsely decreased if patient taking Biotin.     D-DIMER, QUANTITATIVE - Normal    Narrative:     Reference Range  --------------------------------------------------------------------     < 0.50   Negative Predictive Value  0.50-0.59    Indeterminate    >= 0.60   Probable VTE             A very low percentage of patients with DVT may yield D-Dimer results   below the cut-off of 0.50 mg/L FEU.  This is known to be more   prevalent in patients with distal DVT.             Results of this test should always be interpreted in conjunction with   the patient's medical history, clinical presentation and other   findings.  Clinical diagnosis should not be based on the result of   INNOVANCE D-Dimer alone.   POC LACTATE - Normal   BLOOD CULTURE   BLOOD CULTURE   RAINBOW DRAW    Narrative:     The following orders were created for panel order [facility] Draw.  Procedure                               Abnormality         Status                     ---------                               -----------         ------                     Green Top (Gel)[[id number]]                                  Final result               Lavender Top[[id number]]                                     Final result               Gold Top - SST[[id number]]                                   Final result               Light Blue Top[[id number]]                                   Final result                 Please view results for these tests on the individual orders.   POC LACTATE   CBC AND DIFFERENTIAL    Narrative:     The following orders were created for panel order CBC & Differential.  Procedure                               Abnormality         Status                     ---------                               -----------         ------                     CBC Auto Differential[[id number]]        Normal              Final result                 Please view results for these tests on the individual orders.   GREEN TOP   LAVENDER TOP   GOLD TOP - SST   LIGHT BLUE TOP     XR Chest AP    Result Date: 9/10/2021  No acute cardiopulmonary abnormality.  Electronically Signed By-Jose Palumbo MD On:9/10/2021 4:50 PM This report was finalized on [id number] by  Jose Palumbo  MD.    Medications   sodium chloride 0.9 % flush 10 mL (has no administration in time range)   INV casirivimab / imdevimab 1200 mg in 60 mL NS IVPB (premix) (has no administration in time range)   EPINEPHrine PF (ADRENALIN) injection 0.3 mg (has no administration in time range)   diphenhydrAMINE (BENADRYL) injection 50 mg (has no administration in time range)   methylPREDNISolone sodium succinate (SOLU-Medrol) injection 125 mg (has no administration in time range)   sodium chloride 0.9 % infusion 30 mL (has no administration in time range)                                          MDM  Number of Diagnoses or Management Options  Shortness of breath  Diagnosis management comments: Comorbidities: PCOS  Differentials: Pneumonia, PE, bronchitis, pleurisy;this list is not all inclusive and does not constitute the entirety of considered causes  Discussion with provider:  Radiology interpretation: X-rays reviewed by me and interpreted by radiologist: As above  Lab interpretation: Labs viewed by me significant for: As above    Patient was placed on continuous cardiac monitor.  She had IV established.  She had labs, EKG chest x-ray obtained.    Work-up: CBC and metabolic panel are gross unremarkable.  Negative troponin.  D-dimer is within normal limits.  Chest x-ray shows no acute abnormality.  Lactate normal at 1.1.    On reexamination patient is resting comfortably.  She is in no distress.  She reports no new complaints.  She is well-appearing and has stable vital signs.  She is not hypoxic.  Not tachycardic.  Normotensive.  She is ambulatory without difficulty.    The FDA has authorized the emergency use of casirivimab and imdevimab, which is not an FDA approved drug. Discussions with the patient regarding the risks and benefits of casirivimab and imdevimab have occurred. The patient recognizes that this is an investigational treatment which may offer significant known and potential benefits and risks, the extent of which  "are unknown. Information on available alternative treatments and the risks and benefits of those alternatives was discussed. The patient received the \"Fact Sheet for Patients Parents and Caregivers\". All questions from the patient were answered to satisfaction. The patient has the option to accept or refuse treatment with casirivimab and imdevimab and would like to accept treatment.    Counseling regarding continued self isolation and use of infection control measures according to the CDC guidelines has occurred.      Short time after counseling the patient on Regeneron infusion, she decided she no longer wants to receive it.    Diagnosis and treatment plan discussed with patient.  Patient agreeable to plan.   I discussed findings with patient who voices understanding of discharge instructions, signs and symptoms requiring return to ED; discharged improved and in stable condition with follow up for re-evaluation.         Amount and/or Complexity of Data Reviewed  Clinical lab tests: ordered and reviewed  Tests in the radiology section of CPT®: ordered and reviewed    Patient Progress  Patient progress: stable      Final diagnoses:   Shortness of breath       ED Disposition  ED Disposition     ED Disposition Condition Comment    Discharge Stable           Israel Joseph Jr., MD  [address] [place] [address] [place] [address]  [phone]               Medication List      No changes were made to your prescriptions during this visit.          Sofie Mullins APRN  09/10/21 1712       Sofie Mullins APRN  09/10/21 1721    "

## 2021-09-14 ENCOUNTER — HOSPITAL ENCOUNTER (EMERGENCY)
Facility: HOSPITAL | Age: 26
Discharge: HOME | End: 2021-09-15
Admitting: EMERGENCY MEDICINE

## 2021-09-14 DIAGNOSIS — J40 BRONCHITIS: Primary | ICD-10-CM

## 2021-09-14 DIAGNOSIS — U07.1 COVID-19: ICD-10-CM

## 2021-09-14 PROCEDURE — 99283 EMERGENCY DEPT VISIT LOW MDM: CPT

## 2021-09-14 PROCEDURE — 63710000001 ONDANSETRON ODT 4 MG TABLET DISPERSIBLE: Performed by: NURSE PRACTITIONER

## 2021-09-14 RX ORDER — ACETAMINOPHEN 500 MG
1000 TABLET ORAL ONCE
Status: COMPLETED | OUTPATIENT
Start: 2021-09-14 | End: 2021-09-14

## 2021-09-14 RX ORDER — ONDANSETRON 4 MG/1
4 TABLET, ORALLY DISINTEGRATING ORAL ONCE
Status: COMPLETED | OUTPATIENT
Start: 2021-09-14 | End: 2021-09-14

## 2021-09-14 RX ORDER — GUAIFENESIN AND CODEINE PHOSPHATE 100; 10 MG/5ML; MG/5ML
10 SOLUTION ORAL ONCE
Status: COMPLETED | OUTPATIENT
Start: 2021-09-14 | End: 2021-09-14

## 2021-09-14 RX ADMIN — ACETAMINOPHEN 1000 MG: 500 TABLET, FILM COATED ORAL at 23:28

## 2021-09-14 RX ADMIN — ONDANSETRON 4 MG: 4 TABLET, ORALLY DISINTEGRATING ORAL at 23:30

## 2021-09-14 RX ADMIN — GUAIFENESIN AND CODEINE PHOSPHATE 10 ML: 10; 100 LIQUID ORAL at 23:29

## 2021-09-15 ENCOUNTER — APPOINTMENT (OUTPATIENT)
Dept: GENERAL RADIOLOGY | Facility: HOSPITAL | Age: 26
End: 2021-09-15

## 2021-09-15 VITALS
DIASTOLIC BLOOD PRESSURE: 78 MMHG | BODY MASS INDEX: 44.41 KG/M2 | RESPIRATION RATE: 18 BRPM | SYSTOLIC BLOOD PRESSURE: 95 MMHG | WEIGHT: 293 LBS | HEIGHT: 68 IN | HEART RATE: 102 BPM | TEMPERATURE: 99.4 F | OXYGEN SATURATION: 98 %

## 2021-09-15 LAB
BACTERIA SPEC AEROBE CULT: NORMAL
BACTERIA SPEC AEROBE CULT: NORMAL

## 2021-09-15 PROCEDURE — 71045 X-RAY EXAM CHEST 1 VIEW: CPT

## 2021-09-15 RX ORDER — BROMPHENIRAMINE MALEATE, PSEUDOEPHEDRINE HYDROCHLORIDE, AND DEXTROMETHORPHAN HYDROBROMIDE 2; 30; 10 MG/5ML; MG/5ML; MG/5ML
5 SYRUP ORAL 4 TIMES DAILY PRN
Qty: 280 ML | Refills: 0 | Status: SHIPPED | OUTPATIENT
Start: 2021-09-15 | End: 2021-09-22

## 2021-09-15 RX ORDER — METHYLPREDNISOLONE 4 MG/1
TABLET ORAL
Qty: 21 TABLET | Refills: 0 | OUTPATIENT
Start: 2021-09-15 | End: 2021-11-15

## 2021-09-15 RX ORDER — ALBUTEROL SULFATE 90 UG/1
2 AEROSOL, METERED RESPIRATORY (INHALATION) EVERY 4 HOURS PRN
Qty: 6.7 G | Refills: 0 | Status: SHIPPED | OUTPATIENT
Start: 2021-09-15 | End: 2021-09-22

## 2021-09-15 RX ORDER — ONDANSETRON 4 MG/1
4 TABLET, ORALLY DISINTEGRATING ORAL EVERY 8 HOURS PRN
Qty: 20 TABLET | Refills: 0 | Status: SHIPPED | OUTPATIENT
Start: 2021-09-15 | End: 2021-09-20

## 2021-09-15 NOTE — ED NOTES
"Pt states \"I just feel like I'm not getting any better.\"     Ariela Saucedo RN  09/14/21 1973    "

## 2021-09-15 NOTE — ED PROVIDER NOTES
Subjective   25 y/o morbidly obese COVID + female presents to ER with complaint of generalized fatigue and not feeling any better.  Patient reports nausea and cough.  Patient denies fever and states she hasn't even had to take any tylenol or ibuprofen today.  Onset: About 1 week ago  Location: Generalized fatigue  Duration: For 1 week  Character: Persistent  Aggravating/Alleviating Factors: Exerting any energy/rest  Radiation: Entire body  Severity: Severe            Review of Systems   Constitutional: Positive for activity change, appetite change and fatigue. Negative for fever.   HENT: Negative.    Eyes: Negative.    Respiratory: Positive for cough.    Gastrointestinal: Positive for nausea.   Endocrine: Negative.    Genitourinary: Negative for dysuria.   Musculoskeletal: Negative.    Skin: Negative for rash and wound.   Allergic/Immunologic: Negative.    Neurological: Positive for weakness.   Hematological: Negative.    Psychiatric/Behavioral: Negative.        Past Medical History:   Diagnosis Date   • PCOS (polycystic ovarian syndrome)        No Known Allergies    Past Surgical History:   Procedure Laterality Date   •  SECTION     • CYSTOSCOPY W/ LASER LITHOTRIPSY     • TONSILLECTOMY         Family History   Problem Relation Age of Onset   • Hypertension Mother        Social History     Socioeconomic History   • Marital status:      Spouse name: Not on file   • Number of children: Not on file   • Years of education: Not on file   • Highest education level: Not on file   Tobacco Use   • Smoking status: Former Smoker     Packs/day: 0.50   • Tobacco comment: quit 6 months ago   Vaping Use   • Vaping Use: Former   • Substances: Nicotine   Substance and Sexual Activity   • Alcohol use: Yes     Alcohol/week: 3.0 standard drinks     Types: 3 Cans of beer per week   • Drug use: No   • Sexual activity: Yes     Partners: Male           Objective   Physical Exam  Constitutional:       General: She is not in  acute distress.     Appearance: She is obese. She is not ill-appearing, toxic-appearing or diaphoretic.   HENT:      Head: Normocephalic and atraumatic.      Right Ear: Tympanic membrane, ear canal and external ear normal.      Left Ear: Tympanic membrane, ear canal and external ear normal.      Nose: No congestion or rhinorrhea.      Mouth/Throat:      Mouth: Mucous membranes are moist.      Pharynx: Oropharynx is clear. No oropharyngeal exudate or posterior oropharyngeal erythema.   Eyes:      Extraocular Movements: Extraocular movements intact.      Conjunctiva/sclera: Conjunctivae normal.      Pupils: Pupils are equal, round, and reactive to light.   Cardiovascular:      Rate and Rhythm: Normal rate.      Pulses: Normal pulses.   Pulmonary:      Effort: Pulmonary effort is normal.   Abdominal:      Palpations: Abdomen is soft.   Musculoskeletal:         General: Normal range of motion.      Cervical back: Normal range of motion and neck supple.   Skin:     General: Skin is warm and dry.      Capillary Refill: Capillary refill takes less than 2 seconds.   Neurological:      General: No focal deficit present.      Mental Status: She is alert and oriented to person, place, and time.         Procedures           ED Course      Labs Reviewed - No data to display     No radiology results for the last day     Medications   acetaminophen (TYLENOL) tablet 1,000 mg (1,000 mg Oral Given 9/14/21 2328)   guaiFENesin-codeine (GUAIFENESIN AC) 100-10 MG/5ML liquid 10 mL (10 mL Oral Given 9/14/21 2329)   ondansetron ODT (ZOFRAN-ODT) disintegrating tablet 4 mg (4 mg Oral Given 9/14/21 2330)     Patient reports feeling much better after medication administration.  Discharge home with prescriptions for Proventil, Medrol dose pack, Zofran and Bromfed DM to help with her symptoms of Covid - cough, nausea, shortness of breath.                                     MDM    Final diagnoses:   Bronchitis   COVID-19       ED Disposition  ED  Disposition     ED Disposition Condition Comment    Discharge Stable           Israel Joseph Jr., MD  [address] [facility] [address] [place] [address] [place] [address] [phone]    Schedule an appointment as soon as possible for a visit in 3 days  As needed, If symptoms worsen         Medication List      New Prescriptions    albuterol sulfate  (90 Base) MCG/ACT inhaler  Commonly known as: PROVENTIL HFA;VENTOLIN HFA;PROAIR HFA  Inhale 2 puffs Every 4 (Four) Hours As Needed for Wheezing for up to 7 days.     brompheniramine-pseudoephedrine-DM 30-2-10 MG/5ML syrup  Take 5 mL by mouth 4 (Four) Times a Day As Needed for Allergies for up to 7 days.     methylPREDNISolone 4 MG dose pack  Commonly known as: MEDROL  Take as directed on package instructions.     ondansetron ODT 4 MG disintegrating tablet  Commonly known as: Zofran ODT  Place 1 tablet on the tongue Every 8 (Eight) Hours As Needed for Nausea or Vomiting for up to 5 days.           Where to Get Your Medications      These medications were sent to [id number] - [place], [place] - [address] - [phone]  - [phone] FX  [address]  [place] [address]    Phone: [phone]   · albuterol sulfate  (90 Base) MCG/ACT inhaler  · brompheniramine-pseudoephedrine-DM 30-2-10 MG/5ML syrup  · methylPREDNISolone 4 MG dose pack  · ondansetron ODT 4 MG disintegrating tablet          Marycarmen Snell, APRN  09/15/21 0058

## 2021-09-15 NOTE — DISCHARGE INSTRUCTIONS
Rest and drink plenty of fluids.  Fill and take prescriptions, as directed.  Continue to self-quarantine for next 7 days.

## 2021-09-15 NOTE — ED NOTES
"Pt c/o increased SOA x2-3 hours; states Dx covid 9/7/21. C/o fingers and toes \"drawing up\" and leg/foot cramps today. States sleeping most of the day.     Ariela Saucedo RN  09/14/21 0511    "

## 2021-09-22 LAB — QT INTERVAL: 375 MS

## 2022-06-20 ENCOUNTER — TELEPHONE (OUTPATIENT)
Dept: FAMILY MEDICINE CLINIC | Facility: CLINIC | Age: 27
End: 2022-06-20

## 2022-06-20 NOTE — TELEPHONE ENCOUNTER
DELETE AFTER REVIEWING: Telephone encounter to be sent to the clinical pool     Caller: Brandi Ash    Relationship to patient: Self    Best call back number: [phone]    Chief complaint: PATIENT CALLED STATING THAT HER CHEST IS HURTING AND THAT SHE HAS SHORTNESS OF BREATH.     Patient directed to call 911 or go to their nearest emergency room.     Patient verbalized understanding: [x] Yes  [] No  If no, why?    Additional notes: PATIENT STATES THAT HER HEMOGLOBIN WAS 7.8 AND THAT SHE TESTED POSITIVE FOR COVID YESTERDAY.

## 2022-07-12 ENCOUNTER — OFFICE VISIT (OUTPATIENT)
Dept: FAMILY MEDICINE CLINIC | Facility: CLINIC | Age: 27
End: 2022-07-12

## 2022-07-12 ENCOUNTER — LAB (OUTPATIENT)
Dept: LAB | Facility: HOSPITAL | Age: 27
End: 2022-07-12

## 2022-07-12 VITALS
BODY MASS INDEX: 44.41 KG/M2 | OXYGEN SATURATION: 99 % | HEIGHT: 68 IN | SYSTOLIC BLOOD PRESSURE: 140 MMHG | WEIGHT: 293 LBS | DIASTOLIC BLOOD PRESSURE: 90 MMHG | HEART RATE: 119 BPM | TEMPERATURE: 96.9 F

## 2022-07-12 DIAGNOSIS — Z11.59 NEED FOR HEPATITIS C SCREENING TEST: Chronic | ICD-10-CM

## 2022-07-12 DIAGNOSIS — E28.2 POLYCYSTIC OVARIAN SYNDROME: Chronic | ICD-10-CM

## 2022-07-12 DIAGNOSIS — N93.9 ABNORMAL VAGINAL BLEEDING: Chronic | ICD-10-CM

## 2022-07-12 DIAGNOSIS — F41.9 ANXIETY: Chronic | ICD-10-CM

## 2022-07-12 DIAGNOSIS — R73.01 IMPAIRED FASTING GLUCOSE: Chronic | ICD-10-CM

## 2022-07-12 DIAGNOSIS — N93.9 ABNORMAL VAGINAL BLEEDING: ICD-10-CM

## 2022-07-12 DIAGNOSIS — E66.01 CLASS 3 SEVERE OBESITY DUE TO EXCESS CALORIES WITH SERIOUS COMORBIDITY AND BODY MASS INDEX (BMI) OF 50.0 TO 59.9 IN ADULT: Chronic | ICD-10-CM

## 2022-07-12 DIAGNOSIS — B37.31 VAGINAL YEAST INFECTION: Chronic | ICD-10-CM

## 2022-07-12 PROBLEM — E66.813 CLASS 3 SEVERE OBESITY DUE TO EXCESS CALORIES WITH BODY MASS INDEX (BMI) OF 50.0 TO 59.9 IN ADULT: Status: ACTIVE | Noted: 2017-04-11

## 2022-07-12 PROBLEM — N39.0 URINARY TRACT INFECTION: Status: ACTIVE | Noted: 2022-07-12

## 2022-07-12 PROBLEM — R12 HEARTBURN: Status: ACTIVE | Noted: 2017-04-12

## 2022-07-12 PROBLEM — R03.0 ELEVATED BLOOD PRESSURE READING WITHOUT DIAGNOSIS OF HYPERTENSION: Status: ACTIVE | Noted: 2017-08-10

## 2022-07-12 PROBLEM — F17.200 SMOKER: Status: ACTIVE | Noted: 2018-03-02

## 2022-07-12 PROBLEM — H60.92 OTITIS EXTERNA OF LEFT EAR: Status: RESOLVED | Noted: 2019-07-26 | Resolved: 2022-07-12

## 2022-07-12 PROBLEM — F17.200 SMOKER: Status: RESOLVED | Noted: 2018-03-02 | Resolved: 2022-07-12

## 2022-07-12 PROBLEM — R73.02 IMPAIRED GLUCOSE TOLERANCE: Status: ACTIVE | Noted: 2017-01-31

## 2022-07-12 PROBLEM — L83 ACANTHOSIS NIGRICANS: Status: ACTIVE | Noted: 2018-01-04

## 2022-07-12 PROBLEM — F90.9 ADHD: Status: ACTIVE | Noted: 2022-07-12

## 2022-07-12 PROBLEM — B35.4 TINEA CORPORIS: Status: ACTIVE | Noted: 2018-01-04

## 2022-07-12 PROBLEM — M25.561 KNEE PAIN, RIGHT: Status: ACTIVE | Noted: 2019-05-17

## 2022-07-12 PROBLEM — Z30.9 CONTRACEPTION MANAGEMENT: Status: ACTIVE | Noted: 2018-01-04

## 2022-07-12 LAB
25(OH)D3 SERPL-MCNC: 42.6 NG/ML (ref 30–100)
ALBUMIN SERPL-MCNC: 3.8 G/DL (ref 3.5–5.2)
ALBUMIN/GLOB SERPL: 1.2 G/DL
ALP SERPL-CCNC: 60 U/L (ref 39–117)
ALT SERPL W P-5'-P-CCNC: 20 U/L (ref 1–33)
ANION GAP SERPL CALCULATED.3IONS-SCNC: 13.2 MMOL/L (ref 5–15)
AST SERPL-CCNC: 15 U/L (ref 1–32)
BASOPHILS # BLD AUTO: 0.05 10*3/MM3 (ref 0–0.2)
BASOPHILS NFR BLD AUTO: 0.6 % (ref 0–1.5)
BILIRUB SERPL-MCNC: <0.2 MG/DL (ref 0–1.2)
BUN SERPL-MCNC: 8 MG/DL (ref 6–20)
BUN/CREAT SERPL: 13.3 (ref 7–25)
CALCIUM SPEC-SCNC: 8.8 MG/DL (ref 8.6–10.5)
CHLORIDE SERPL-SCNC: 107 MMOL/L (ref 98–107)
CHOLEST SERPL-MCNC: 200 MG/DL (ref 0–200)
CO2 SERPL-SCNC: 19.8 MMOL/L (ref 22–29)
CREAT SERPL-MCNC: 0.6 MG/DL (ref 0.57–1)
DEPRECATED RDW RBC AUTO: 46.1 FL (ref 37–54)
EGFRCR SERPLBLD CKD-EPI 2021: 125.6 ML/MIN/1.73
EOSINOPHIL # BLD AUTO: 0.11 10*3/MM3 (ref 0–0.4)
EOSINOPHIL NFR BLD AUTO: 1.4 % (ref 0.3–6.2)
ERYTHROCYTE [DISTWIDTH] IN BLOOD BY AUTOMATED COUNT: 14.8 % (ref 12.3–15.4)
FERRITIN SERPL-MCNC: 33.4 NG/ML (ref 13–150)
FOLATE SERPL-MCNC: 7.08 NG/ML (ref 4.78–24.2)
GLOBULIN UR ELPH-MCNC: 3.1 GM/DL
GLUCOSE SERPL-MCNC: 119 MG/DL (ref 65–99)
HBA1C MFR BLD: 4.7 % (ref 3.5–5.6)
HCT VFR BLD AUTO: 34.7 % (ref 34–46.6)
HCV AB SER DONR QL: NORMAL
HDLC SERPL-MCNC: 51 MG/DL (ref 40–60)
HGB BLD-MCNC: 10.8 G/DL (ref 12–15.9)
IMM GRANULOCYTES # BLD AUTO: 0.02 10*3/MM3 (ref 0–0.05)
IMM GRANULOCYTES NFR BLD AUTO: 0.3 % (ref 0–0.5)
IRON 24H UR-MRATE: 190 MCG/DL (ref 37–145)
LDLC SERPL CALC-MCNC: 115 MG/DL (ref 0–100)
LDLC/HDLC SERPL: 2.15 {RATIO}
LYMPHOCYTES # BLD AUTO: 3 10*3/MM3 (ref 0.7–3.1)
LYMPHOCYTES NFR BLD AUTO: 37.7 % (ref 19.6–45.3)
MCH RBC QN AUTO: 27 PG (ref 26.6–33)
MCHC RBC AUTO-ENTMCNC: 31.1 G/DL (ref 31.5–35.7)
MCV RBC AUTO: 86.8 FL (ref 79–97)
MONOCYTES # BLD AUTO: 0.38 10*3/MM3 (ref 0.1–0.9)
MONOCYTES NFR BLD AUTO: 4.8 % (ref 5–12)
NEUTROPHILS NFR BLD AUTO: 4.39 10*3/MM3 (ref 1.7–7)
NEUTROPHILS NFR BLD AUTO: 55.2 % (ref 42.7–76)
NRBC BLD AUTO-RTO: 0 /100 WBC (ref 0–0.2)
PLATELET # BLD AUTO: 359 10*3/MM3 (ref 140–450)
PMV BLD AUTO: 10.7 FL (ref 6–12)
POTASSIUM SERPL-SCNC: 4.1 MMOL/L (ref 3.5–5.2)
PROT SERPL-MCNC: 6.9 G/DL (ref 6–8.5)
RBC # BLD AUTO: 4 10*6/MM3 (ref 3.77–5.28)
RETICS # AUTO: 0.06 10*6/MM3 (ref 0.02–0.13)
RETICS/RBC NFR AUTO: 1.55 % (ref 0.7–1.9)
SODIUM SERPL-SCNC: 140 MMOL/L (ref 136–145)
T4 FREE SERPL-MCNC: 1.31 NG/DL (ref 0.93–1.7)
TRIGL SERPL-MCNC: 197 MG/DL (ref 0–150)
TSH SERPL DL<=0.05 MIU/L-ACNC: 0.76 UIU/ML (ref 0.27–4.2)
VIT B12 BLD-MCNC: 349 PG/ML (ref 211–946)
VLDLC SERPL-MCNC: 34 MG/DL (ref 5–40)
WBC NRBC COR # BLD: 7.95 10*3/MM3 (ref 3.4–10.8)

## 2022-07-12 PROCEDURE — 36415 COLL VENOUS BLD VENIPUNCTURE: CPT | Performed by: NURSE PRACTITIONER

## 2022-07-12 PROCEDURE — 83540 ASSAY OF IRON: CPT

## 2022-07-12 PROCEDURE — 85045 AUTOMATED RETICULOCYTE COUNT: CPT

## 2022-07-12 PROCEDURE — 80061 LIPID PANEL: CPT

## 2022-07-12 PROCEDURE — 84439 ASSAY OF FREE THYROXINE: CPT

## 2022-07-12 PROCEDURE — 83036 HEMOGLOBIN GLYCOSYLATED A1C: CPT

## 2022-07-12 PROCEDURE — 99214 OFFICE O/P EST MOD 30 MIN: CPT | Performed by: NURSE PRACTITIONER

## 2022-07-12 PROCEDURE — 80050 GENERAL HEALTH PANEL: CPT

## 2022-07-12 PROCEDURE — 82746 ASSAY OF FOLIC ACID SERUM: CPT | Performed by: NURSE PRACTITIONER

## 2022-07-12 PROCEDURE — 86803 HEPATITIS C AB TEST: CPT

## 2022-07-12 PROCEDURE — 82728 ASSAY OF FERRITIN: CPT

## 2022-07-12 PROCEDURE — 82306 VITAMIN D 25 HYDROXY: CPT

## 2022-07-12 PROCEDURE — 82607 VITAMIN B-12: CPT | Performed by: NURSE PRACTITIONER

## 2022-07-12 RX ORDER — FERROUS SULFATE 325(65) MG
TABLET ORAL
COMMUNITY
Start: 2022-06-14

## 2022-07-12 RX ORDER — HYDROXYZINE PAMOATE 25 MG/1
CAPSULE ORAL 4 TIMES DAILY
COMMUNITY
Start: 2022-06-17 | End: 2023-03-28

## 2022-07-12 RX ORDER — ETONOGESTREL AND ETHINYL ESTRADIOL .12; .015 MG/D; MG/D
RING VAGINAL
COMMUNITY
Start: 2022-06-24

## 2022-07-12 RX ORDER — FLUCONAZOLE 150 MG/1
TABLET ORAL
Qty: 2 TABLET | Refills: 0 | Status: SHIPPED | OUTPATIENT
Start: 2022-07-12 | End: 2022-09-22 | Stop reason: ALTCHOICE

## 2022-07-12 RX ORDER — NORETHINDRONE ACETATE/ETHINYL ESTRADIOL AND FERROUS FUMARATE 1MG-20(21)
KIT ORAL
COMMUNITY
Start: 2022-07-11

## 2022-07-12 NOTE — PROGRESS NOTES
Subjective   {CC  Problem List  Visit Diagnosis   Encounters  Notes  Medications  Labs  Result Review Imaging  Media :23}     Brandi Ash is a 27 y.o. female.     Chief Complaint   Patient presents with   • Anxiety     Increased anxiety   • Hypertension     Elevated BP few weeks       History of Present Illness  Patient is here for severe anxiety and she has high blood pressure. She panics when she takes it.   She is constant state of stress.   She is on iron for low hgb and hct. She was having heavy periods.   She made appointment with psychiatry for her anxiety. She will be seen at end of month. She used see [place] in [place].   She thinks she has yeast infection. Started monistat 7 this is in past few days.     Anxiety: denies she is suicidal or homicidal. She is just anxious. She said she worries about everything. She is caring for her mother in law 3 days a week but otherwise she just doesn't want to be awake to deal with her medical problems.  She was medicated in past she quit going to services due to loss of insurance.     Hypertension: has been having high blood pressures long time she reports was afraid to do anything .       The following portions of the patient's history were reviewed and updated as appropriate: allergies, current medications, past family history, past medical history, past social history, past surgical history and problem list.      Current Outpatient Medications:   •  EluRyng 0.12-0.015 MG/24HR vaginal ring, Currently using- about to switch to PO med, Disp: , Rfl:   •  FeroSul 325 (65 Fe) MG tablet, With vitamin C, Disp: , Rfl:   •  hydrOXYzine pamoate (VISTARIL) 25 MG capsule, 4 (Four) Times a Day., Disp: , Rfl:   •  Dagoberto Fe 1/20 1-20 MG-MCG per tablet, , Disp: , Rfl:   •  fluconazole (Diflucan) 150 MG tablet, Take one now repeat one dose 3 days, Disp: 2 tablet, Rfl: 0  •  metoprolol tartrate (LOPRESSOR) 25 MG tablet, Take 1 tablet by mouth 2 (Two) Times a  "Day., Disp: 60 tablet, Rfl: 0    No results found for this or any previous visit (from the past 4032 hour(s)).      Review of Systems   HENT:        Current with dental exam   Eyes:        She wears glasses.   No recent eye exam   Respiratory: Negative for cough, choking, shortness of breath and wheezing.    Cardiovascular: Positive for chest pain.        With her anxiety   Gastrointestinal: Negative for abdominal pain, blood in stool, constipation, diarrhea and nausea.   Genitourinary: Positive for menstrual problem. Negative for breast discharge, breast lump, breast pain and genital sores.        Vaginal itching thinks yeast infection.    Musculoskeletal: Positive for back pain.        Saw chiropractor in past.    Skin:        No skin cancer no poor wound healing no rashes   Neurological: Negative for tremors, seizures and headache.   Hematological: Negative.    Psychiatric/Behavioral: Negative for suicidal ideas and depressed mood. The patient is nervous/anxious.        Objective     /90   Pulse 119   Temp 96.9 °F (36.1 °C) (Infrared)   Ht 172.7 cm (68\")   Wt (!) 150 kg (330 lb)   SpO2 99%   BMI 50.18 kg/m²     Physical Exam  Vitals and nursing note reviewed.   Constitutional:       Appearance: She is obese.   HENT:      Head: Normocephalic.      Right Ear: External ear normal.      Left Ear: External ear normal.      Nose: Nose normal.      Mouth/Throat:      Mouth: Mucous membranes are moist.   Eyes:      Conjunctiva/sclera: Conjunctivae normal.      Pupils: Pupils are equal, round, and reactive to light.   Cardiovascular:      Rate and Rhythm: Normal rate and regular rhythm.      Pulses: Normal pulses.      Heart sounds: Normal heart sounds.   Pulmonary:      Effort: Pulmonary effort is normal.      Breath sounds: Normal breath sounds.   Abdominal:      General: Bowel sounds are normal.      Palpations: Abdomen is soft.   Musculoskeletal:         General: Normal range of motion.      Cervical back: " Neck supple.   Skin:     General: Skin is warm and dry.      Capillary Refill: Capillary refill takes less than 2 seconds.   Neurological:      General: No focal deficit present.      Mental Status: She is alert and oriented to person, place, and time.   Psychiatric:         Mood and Affect: Mood normal.         Behavior: Behavior normal.         Thought Content: Thought content normal.         Judgment: Judgment normal.         Result Review :                Assessment & Plan    Diagnoses and all orders for this visit:    1. Body mass index 50.0-59.9, adult ([facility])  Comments:  we discussed and she has lost weight as reported of recent.   Orders:  -     Comprehensive Metabolic Panel; Future  -     Lipid Panel; Future    2. Impaired fasting glucose  Comments:  checking A1C  Orders:  -     Comprehensive Metabolic Panel; Future  -     TSH; Future  -     T4, free; Future  -     Hemoglobin A1c; Future    3. Polycystic ovarian syndrome  Comments:  she is followed by gynecologist  Orders:  -     Comprehensive Metabolic Panel; Future  -     TSH; Future  -     T4, free; Future  -     Hemoglobin A1c; Future    4. Anxiety  Comments:  she has scheduled an appointment with psychiatry.   Orders:  -     TSH; Future  -     T4, free; Future    5. Abnormal vaginal bleeding  Comments:  she is followed by Dr Moran gyn. she has been told she is iron def and is taking medications.   Orders:  -     Iron; Future  -     Vitamin B12 and Folate  -     Ferritin; Future  -     CBC and Differential; Future  -     Reticulocytes; Future  -     Vitamin D 25 Hydroxy; Future    6. Need for hepatitis C screening test  Comments:  testing ordered  Orders:  -     Hepatitis C antibody; Future    7. Class 3 severe obesity due to excess calories with serious comorbidity and body mass index (BMI) of 50.0 to 59.9 in adult ([facility])  Comments:  discussed exercise and weight loss.     8. Vaginal yeast infection  Comments:  diflucan ordered    Other orders  -      metoprolol tartrate (LOPRESSOR) 25 MG tablet; Take 1 tablet by mouth 2 (Two) Times a Day.  Dispense: 60 tablet; Refill: 0  -     fluconazole (Diflucan) 150 MG tablet; Take one now repeat one dose 3 days  Dispense: 2 tablet; Refill: 0      Patient Instructions   Continue the hydroxyzine.   Take iron with vit d  Eat foods high in iron.  Follow up with gyn   Take the diflucan as prescribed.         Follow Up   No follow-ups on file.    Patient was given instructions and counseling regarding her condition or for health maintenance advice. Please see specific information pulled into the AVS if appropriate.     Jade Whitlock, APRN    07/12/22

## 2022-07-12 NOTE — PATIENT INSTRUCTIONS
Continue the hydroxyzine.   Take iron with vit d  Eat foods high in iron.  Follow up with gyn   Take the diflucan as prescribed.

## 2022-08-15 NOTE — TELEPHONE ENCOUNTER
Caller: Brandi Ash    Relationship: Self    Best call back number: [phone]    Requested Prescriptions:   Requested Prescriptions     Pending Prescriptions Disp Refills   • metoprolol tartrate (LOPRESSOR) 25 MG tablet 60 tablet 0     Sig: Take 1 tablet by mouth 2 (Two) Times a Day.        Pharmacy where request should be sent: [facility] [address] [place] [address] [address]  - [phone]  - [phone] FX       Does the patient have less than a 3 day supply:  [x] Yes  [] No    [facility]   08/15/22 08:26 EDT

## 2022-08-16 ENCOUNTER — TELEPHONE (OUTPATIENT)
Dept: FAMILY MEDICINE CLINIC | Facility: CLINIC | Age: 27
End: 2022-08-16

## 2022-08-16 NOTE — TELEPHONE ENCOUNTER
Ok  Hub to read lvm to pt  Jade does not usually prescribe these kinds of medications, she usually refers to psych. Pt has appt 8/25/22 and can discuss this with her then, Jade can also put in referral to psych.

## 2022-08-16 NOTE — TELEPHONE ENCOUNTER
Caller: Brandi Ash    Relationship: Self    Best call back number:[phone]       What is the best time to reach you: ANYTIME    Who are you requesting to speak with (clinical staff, provider,  specific staff member): CLINICAL STAFF       What was the call regarding: PATIENT STATES HER PSYCHIATRIST IS OFFBOARDING AND SHE HAS BEEN CALLING AROUND TRYING TO FIND A NEW PROVIDER TO FILL HER MEDICATIONS. PATIENT STATES EVERYONE IS BOOKED OUT FOR MONTHS. PATIENT IS WANTING TO KNOW IF [facility] COULD PRESCRIBE THE MEDICATIONS FOR HER. THE MEDICATIONS ARE LEXAPRO AND BUSPAR. THE PREVIOUS PROVIDER STATED THEY WOULD REFILL THIS MEDICATION FOR 1 MORE MONTH.     PATIENT STATES THIS MEDICATION HAS BEEN WORKING REALLY WELL FOR HER AND SHE DOES NOT WANT TO BE OFF OF IT.     PLEASE CALL AND ADVISE     Do you require a callback: YES

## 2022-08-25 ENCOUNTER — OFFICE VISIT (OUTPATIENT)
Dept: FAMILY MEDICINE CLINIC | Facility: CLINIC | Age: 27
End: 2022-08-25

## 2022-08-25 VITALS
BODY MASS INDEX: 44.41 KG/M2 | OXYGEN SATURATION: 95 % | HEIGHT: 68 IN | WEIGHT: 293 LBS | TEMPERATURE: 96.9 F | HEART RATE: 84 BPM | SYSTOLIC BLOOD PRESSURE: 134 MMHG | DIASTOLIC BLOOD PRESSURE: 100 MMHG

## 2022-08-25 DIAGNOSIS — E66.01 MORBID OBESITY WITH BODY MASS INDEX (BMI) OF 40.0 TO 49.9: Chronic | ICD-10-CM

## 2022-08-25 DIAGNOSIS — R35.0 URINARY FREQUENCY: Chronic | ICD-10-CM

## 2022-08-25 DIAGNOSIS — I15.9 SECONDARY HYPERTENSION: Primary | Chronic | ICD-10-CM

## 2022-08-25 DIAGNOSIS — F41.9 ANXIETY: Chronic | ICD-10-CM

## 2022-08-25 LAB
BILIRUB BLD-MCNC: NEGATIVE MG/DL
CLARITY, POC: CLEAR
COLOR UR: YELLOW
EXPIRATION DATE: ABNORMAL
GLUCOSE UR STRIP-MCNC: NEGATIVE MG/DL
KETONES UR QL: NEGATIVE
LEUKOCYTE EST, POC: NEGATIVE
Lab: ABNORMAL
NITRITE UR-MCNC: NEGATIVE MG/ML
PH UR: 5.5 [PH] (ref 5–8)
PROT UR STRIP-MCNC: ABNORMAL MG/DL
RBC # UR STRIP: ABNORMAL /UL
SP GR UR: 1.02 (ref 1–1.03)
UROBILINOGEN UR QL: ABNORMAL

## 2022-08-25 PROCEDURE — 99214 OFFICE O/P EST MOD 30 MIN: CPT | Performed by: NURSE PRACTITIONER

## 2022-08-25 RX ORDER — METOPROLOL TARTRATE 50 MG/1
50 TABLET, FILM COATED ORAL 2 TIMES DAILY
Qty: 180 TABLET | Refills: 0 | Status: SHIPPED | OUTPATIENT
Start: 2022-08-25

## 2022-08-25 RX ORDER — ESCITALOPRAM OXALATE 20 MG/1
20 TABLET ORAL DAILY
COMMUNITY
Start: 2022-07-25 | End: 2022-11-11 | Stop reason: SDUPTHER

## 2022-08-25 RX ORDER — BUSPIRONE HYDROCHLORIDE 10 MG/1
10 TABLET ORAL 2 TIMES DAILY
COMMUNITY
Start: 2022-07-25 | End: 2022-11-11 | Stop reason: SDUPTHER

## 2022-08-25 NOTE — PATIENT INSTRUCTIONS
Start increased dose of metoprolol to 50 mg bid.   Don't run out of meds I can refill until seen by psych.

## 2022-08-25 NOTE — PROGRESS NOTES
Subjective   {CC  Problem List  Visit Diagnosis   Encounters  Notes  Medications  Labs  Result Review Imaging  Media :23}     Brandi Ash is a 27 y.o. female.     Chief Complaint   Patient presents with   • Hypertension     1 month f/u   • Urinary Tract Infection     Urinary frequency       History of Present Illness  Patient is here for management of her hypertension new problem she is having frequency of urination.   She has chronic anxiety and said that her medications have helped.     Anxiety she lost her psychiatrist and is running out of medications but said she feels the meds help and she is not in constant panic heart rate as dropped . Taking lexapro 20 mg and buspar 10 mg bid. She can not see psychiatrist until oct told her I will refill one month worth is she does not hear from current facility.     Hypertension gets anxious when she had to check so not checking. She has anxiety that is better but worries about blood pressure being high.     Urinary frequency: no blood no burning just peeing a lot recently.   No fever no nausea no vomiting.     Anemia: hgb improved was 7 now is 10 iron stopped her level was high 7/2022.       The following portions of the patient's history were reviewed and updated as appropriate: allergies, current medications, past family history, past medical history, past social history, past surgical history and problem list.      Current Outpatient Medications:   •  busPIRone (BUSPAR) 10 MG tablet, 10 mg 2 (Two) Times a Day., Disp: , Rfl:   •  escitalopram (LEXAPRO) 20 MG tablet, 20 mg Daily., Disp: , Rfl:   •  Dagoberto Fe 1/20 1-20 MG-MCG per tablet, , Disp: , Rfl:   •  metoprolol tartrate (LOPRESSOR) 50 MG tablet, Take 1 tablet by mouth 2 (Two) Times a Day., Disp: 180 tablet, Rfl: 0  •  EluRyng 0.12-0.015 MG/24HR vaginal ring, Currently using- about to switch to PO med, Disp: , Rfl:   •  FeroSul 325 (65 Fe) MG tablet, With vitamin C, Disp: , Rfl:   •  fluconazole  (Diflucan) 150 MG tablet, Take one now repeat one dose 3 days, Disp: 2 tablet, Rfl: 0  •  hydrOXYzine pamoate (VISTARIL) 25 MG capsule, 4 (Four) Times a Day., Disp: , Rfl:     Recent Results (from the past 4032 hour(s))   Vitamin B12 and Folate    Collection Time: 07/12/22  1:05 PM    Specimen: Blood   Result Value Ref Range    Folate 7.08 4.78 - 24.20 ng/mL    Vitamin B-12 349 211 - 946 pg/mL   Comprehensive Metabolic Panel    Collection Time: 07/12/22  1:05 PM    Specimen: Blood   Result Value Ref Range    Glucose 119 (H) 65 - 99 mg/dL    BUN 8 6 - 20 mg/dL    Creatinine 0.60 0.57 - 1.00 mg/dL    Sodium 140 136 - 145 mmol/L    Potassium 4.1 3.5 - 5.2 mmol/L    Chloride 107 98 - 107 mmol/L    CO2 19.8 (L) 22.0 - 29.0 mmol/L    Calcium 8.8 8.6 - 10.5 mg/dL    Total Protein 6.9 6.0 - 8.5 g/dL    Albumin 3.80 3.50 - 5.20 g/dL    ALT (SGPT) 20 1 - 33 U/L    AST (SGOT) 15 1 - 32 U/L    Alkaline Phosphatase 60 39 - 117 U/L    Total Bilirubin <0.2 0.0 - 1.2 mg/dL    Globulin 3.1 gm/dL    A/G Ratio 1.2 g/dL    BUN/Creatinine Ratio 13.3 7.0 - 25.0    Anion Gap 13.2 5.0 - 15.0 mmol/L    eGFR 125.6 >60.0 mL/min/1.73   Lipid Panel    Collection Time: 07/12/22  1:05 PM    Specimen: Blood   Result Value Ref Range    Total Cholesterol 200 0 - 200 mg/dL    Triglycerides 197 (H) 0 - 150 mg/dL    HDL Cholesterol 51 40 - 60 mg/dL    LDL Cholesterol  115 (H) 0 - 100 mg/dL    VLDL Cholesterol 34 5 - 40 mg/dL    LDL/HDL Ratio 2.15    Iron    Collection Time: 07/12/22  1:05 PM    Specimen: Blood   Result Value Ref Range    Iron 190 (H) 37 - 145 mcg/dL   Ferritin    Collection Time: 07/12/22  1:05 PM    Specimen: Blood   Result Value Ref Range    Ferritin 33.40 13.00 - 150.00 ng/mL   Reticulocytes    Collection Time: 07/12/22  1:05 PM    Specimen: Blood   Result Value Ref Range    Reticulocyte % 1.55 0.70 - 1.90 %    Reticulocyte Absolute 0.0620 0.0200 - 0.1300 10*6/mm3   Vitamin D 25 Hydroxy    Collection Time: 07/12/22  1:05 PM     Specimen: Blood   Result Value Ref Range    25 Hydroxy, Vitamin D 42.6 30.0 - 100.0 ng/ml   Hepatitis C antibody    Collection Time: 07/12/22  1:05 PM    Specimen: Blood   Result Value Ref Range    Hepatitis C Ab Non-Reactive Non-Reactive   TSH    Collection Time: 07/12/22  1:05 PM    Specimen: Blood   Result Value Ref Range    TSH 0.763 0.270 - 4.200 uIU/mL   T4, free    Collection Time: 07/12/22  1:05 PM    Specimen: Blood   Result Value Ref Range    Free T4 1.31 0.93 - 1.70 ng/dL   Hemoglobin A1c    Collection Time: 07/12/22  1:05 PM    Specimen: Blood   Result Value Ref Range    Hemoglobin A1C 4.7 3.5 - 5.6 %   CBC Auto Differential    Collection Time: 07/12/22  1:05 PM    Specimen: Blood   Result Value Ref Range    WBC 7.95 3.40 - 10.80 10*3/mm3    RBC 4.00 3.77 - 5.28 10*6/mm3    Hemoglobin 10.8 (L) 12.0 - 15.9 g/dL    Hematocrit 34.7 34.0 - 46.6 %    MCV 86.8 79.0 - 97.0 fL    MCH 27.0 26.6 - 33.0 pg    MCHC 31.1 (L) 31.5 - 35.7 g/dL    RDW 14.8 12.3 - 15.4 %    RDW-SD 46.1 37.0 - 54.0 fl    MPV 10.7 6.0 - 12.0 fL    Platelets 359 140 - 450 10*3/mm3    Neutrophil % 55.2 42.7 - 76.0 %    Lymphocyte % 37.7 19.6 - 45.3 %    Monocyte % 4.8 (L) 5.0 - 12.0 %    Eosinophil % 1.4 0.3 - 6.2 %    Basophil % 0.6 0.0 - 1.5 %    Immature Grans % 0.3 0.0 - 0.5 %    Neutrophils, Absolute 4.39 1.70 - 7.00 10*3/mm3    Lymphocytes, Absolute 3.00 0.70 - 3.10 10*3/mm3    Monocytes, Absolute 0.38 0.10 - 0.90 10*3/mm3    Eosinophils, Absolute 0.11 0.00 - 0.40 10*3/mm3    Basophils, Absolute 0.05 0.00 - 0.20 10*3/mm3    Immature Grans, Absolute 0.02 0.00 - 0.05 10*3/mm3    nRBC 0.0 0.0 - 0.2 /100 WBC   POCT urinalysis dipstick, automated    Collection Time: 08/25/22  2:10 PM    Specimen: Urine   Result Value Ref Range    Color Yellow Yellow, Straw, Dark Yellow, Zee    Clarity, UA Clear Clear    Specific Gravity  1.025 1.005 - 1.030    pH, Urine 5.5 5.0 - 8.0    Leukocytes Negative Negative    Nitrite, UA Negative Negative     "Protein, POC Trace (A) Negative mg/dL    Glucose, UA Negative Negative mg/dL    Ketones, UA Negative Negative    Urobilinogen, UA 0.2 E.U./dL Normal, 0.2 E.U./dL    Bilirubin Negative Negative    Blood, UA Small (A) Negative    Lot Number [id number]     Expiration Date 2,282,023          Review of Systems    Objective     /100   Pulse 84   Temp 96.9 °F (36.1 °C) (Infrared)   Ht 172.7 cm (68\")   Wt (!) 147 kg (323 lb)   SpO2 95%   BMI 49.11 kg/m²     Physical Exam  Vitals and nursing note reviewed.   Constitutional:       Appearance: She is obese.   HENT:      Head: Normocephalic.      Right Ear: External ear normal.      Left Ear: External ear normal.      Nose: Nose normal.      Mouth/Throat:      Mouth: Mucous membranes are moist.   Eyes:      Conjunctiva/sclera: Conjunctivae normal.      Pupils: Pupils are equal, round, and reactive to light.   Cardiovascular:      Rate and Rhythm: Normal rate and regular rhythm.      Pulses: Normal pulses.      Heart sounds: Normal heart sounds.   Pulmonary:      Effort: Pulmonary effort is normal.      Breath sounds: Normal breath sounds.   Abdominal:      General: Bowel sounds are normal.      Palpations: Abdomen is soft.   Musculoskeletal:         General: Normal range of motion.   Skin:     General: Skin is warm and dry.      Capillary Refill: Capillary refill takes less than 2 seconds.   Neurological:      General: No focal deficit present.      Mental Status: She is alert and oriented to person, place, and time.   Psychiatric:         Mood and Affect: Mood normal.         Behavior: Behavior normal.         Thought Content: Thought content normal.         Judgment: Judgment normal.         Result Review :                Assessment & Plan    Diagnoses and all orders for this visit:    1. Secondary hypertension (Primary)  Comments:  increased metoprolol to 50 mg bid    2. Urinary frequency  Comments:  urine dip neg. tr   pro she is drinking more due to life style " changes.  Orders:  -     POCT urinalysis dipstick, automated    3. Morbid obesity with body mass index (BMI) of 40.0 to 49.9 ([facility])  Comments:  she lost from bmi 50 to 49. eathing helathy and exercise.     4. Anxiety  Comments:  stable    Other orders  -     metoprolol tartrate (LOPRESSOR) 50 MG tablet; Take 1 tablet by mouth 2 (Two) Times a Day.  Dispense: 180 tablet; Refill: 0      Patient Instructions   Start increased dose of metoprolol to 50 mg bid.   Don't run out of meds I can refill until seen by psych.       Follow Up   Return in about 1 month (around 9/25/2022).    Patient was given instructions and counseling regarding her condition or for health maintenance advice. Please see specific information pulled into the AVS if appropriate.     Jade Whitlock, APRN    08/25/22

## 2022-09-22 ENCOUNTER — OFFICE VISIT (OUTPATIENT)
Dept: FAMILY MEDICINE CLINIC | Facility: CLINIC | Age: 27
End: 2022-09-22

## 2022-09-22 VITALS
HEART RATE: 102 BPM | OXYGEN SATURATION: 97 % | SYSTOLIC BLOOD PRESSURE: 149 MMHG | WEIGHT: 293 LBS | HEIGHT: 68 IN | RESPIRATION RATE: 18 BRPM | DIASTOLIC BLOOD PRESSURE: 85 MMHG | BODY MASS INDEX: 44.41 KG/M2 | TEMPERATURE: 96.6 F

## 2022-09-22 DIAGNOSIS — F41.9 ANXIETY: Chronic | ICD-10-CM

## 2022-09-22 DIAGNOSIS — N63.15 MASS OVERLAPPING MULTIPLE QUADRANTS OF RIGHT BREAST: ICD-10-CM

## 2022-09-22 DIAGNOSIS — N63.23 MASS OF LOWER OUTER QUADRANT OF LEFT BREAST: Primary | ICD-10-CM

## 2022-09-22 DIAGNOSIS — I15.9 SECONDARY HYPERTENSION: Chronic | ICD-10-CM

## 2022-09-22 PROCEDURE — 99214 OFFICE O/P EST MOD 30 MIN: CPT | Performed by: NURSE PRACTITIONER

## 2022-09-22 NOTE — PROGRESS NOTES
Subjective   {CC  Problem List  Visit Diagnosis   Encounters  Notes  Medications  Labs  Result Review Imaging  Media :23}     Brandi Ash is a 27 y.o. female.     Chief Complaint   Patient presents with   • Hypertension   • Anxiety     One month follow up   • Breast Mass     Bilateral       History of Present Illness  Patient is here for management of her hypertension, anxiety and new problem masses in both breasts.     New problem left breast pain and lump left breast and 2 on right unsure how long they have been there.   No history of mammogram.   No drainage to breasts. She had similar complaint 7/2019 and had ultrasound left breast. Results reviewed: fairly superficial minimally dilated duct without intraductal debris or mass. No internal blood flow no further work up needed.     Anxiety : she is taking her buspar 10 mg bid and lexapro 20 mg daily. She said has helped her so much she is still trying to get into [facility] [place]. Her previous provider finally gave her 2 refills on her medications.     Rash on her right hand and elbow. Started 2 months ago. Just right side.     The following portions of the patient's history were reviewed and updated as appropriate: allergies, current medications, past family history, past medical history, past social history, past surgical history and problem list.      Current Outpatient Medications:   •  busPIRone (BUSPAR) 10 MG tablet, 10 mg 2 (Two) Times a Day., Disp: , Rfl:   •  escitalopram (LEXAPRO) 20 MG tablet, 20 mg Daily., Disp: , Rfl:   •  Dagoberto Fe 1/20 1-20 MG-MCG per tablet, , Disp: , Rfl:   •  metoprolol tartrate (LOPRESSOR) 50 MG tablet, Take 1 tablet by mouth 2 (Two) Times a Day., Disp: 180 tablet, Rfl: 0  •  EluRyng 0.12-0.015 MG/24HR vaginal ring, Currently using- about to switch to PO med, Disp: , Rfl:   •  FeroSul 325 (65 Fe) MG tablet, With vitamin C, Disp: , Rfl:   •  hydrOXYzine pamoate (VISTARIL) 25 MG capsule, 4 (Four) Times a Day., Disp: ,  Rfl:     Recent Results (from the past 4032 hour(s))   Vitamin B12 and Folate    Collection Time: 07/12/22  1:05 PM    Specimen: Blood   Result Value Ref Range    Folate 7.08 4.78 - 24.20 ng/mL    Vitamin B-12 349 211 - 946 pg/mL   Comprehensive Metabolic Panel    Collection Time: 07/12/22  1:05 PM    Specimen: Blood   Result Value Ref Range    Glucose 119 (H) 65 - 99 mg/dL    BUN 8 6 - 20 mg/dL    Creatinine 0.60 0.57 - 1.00 mg/dL    Sodium 140 136 - 145 mmol/L    Potassium 4.1 3.5 - 5.2 mmol/L    Chloride 107 98 - 107 mmol/L    CO2 19.8 (L) 22.0 - 29.0 mmol/L    Calcium 8.8 8.6 - 10.5 mg/dL    Total Protein 6.9 6.0 - 8.5 g/dL    Albumin 3.80 3.50 - 5.20 g/dL    ALT (SGPT) 20 1 - 33 U/L    AST (SGOT) 15 1 - 32 U/L    Alkaline Phosphatase 60 39 - 117 U/L    Total Bilirubin <0.2 0.0 - 1.2 mg/dL    Globulin 3.1 gm/dL    A/G Ratio 1.2 g/dL    BUN/Creatinine Ratio 13.3 7.0 - 25.0    Anion Gap 13.2 5.0 - 15.0 mmol/L    eGFR 125.6 >60.0 mL/min/1.73   Lipid Panel    Collection Time: 07/12/22  1:05 PM    Specimen: Blood   Result Value Ref Range    Total Cholesterol 200 0 - 200 mg/dL    Triglycerides 197 (H) 0 - 150 mg/dL    HDL Cholesterol 51 40 - 60 mg/dL    LDL Cholesterol  115 (H) 0 - 100 mg/dL    VLDL Cholesterol 34 5 - 40 mg/dL    LDL/HDL Ratio 2.15    Iron    Collection Time: 07/12/22  1:05 PM    Specimen: Blood   Result Value Ref Range    Iron 190 (H) 37 - 145 mcg/dL   Ferritin    Collection Time: 07/12/22  1:05 PM    Specimen: Blood   Result Value Ref Range    Ferritin 33.40 13.00 - 150.00 ng/mL   Reticulocytes    Collection Time: 07/12/22  1:05 PM    Specimen: Blood   Result Value Ref Range    Reticulocyte % 1.55 0.70 - 1.90 %    Reticulocyte Absolute 0.0620 0.0200 - 0.1300 10*6/mm3   Vitamin D 25 Hydroxy    Collection Time: 07/12/22  1:05 PM    Specimen: Blood   Result Value Ref Range    25 Hydroxy, Vitamin D 42.6 30.0 - 100.0 ng/ml   Hepatitis C antibody    Collection Time: 07/12/22  1:05 PM    Specimen: Blood    Result Value Ref Range    Hepatitis C Ab Non-Reactive Non-Reactive   TSH    Collection Time: 07/12/22  1:05 PM    Specimen: Blood   Result Value Ref Range    TSH 0.763 0.270 - 4.200 uIU/mL   T4, free    Collection Time: 07/12/22  1:05 PM    Specimen: Blood   Result Value Ref Range    Free T4 1.31 0.93 - 1.70 ng/dL   Hemoglobin A1c    Collection Time: 07/12/22  1:05 PM    Specimen: Blood   Result Value Ref Range    Hemoglobin A1C 4.7 3.5 - 5.6 %   CBC Auto Differential    Collection Time: 07/12/22  1:05 PM    Specimen: Blood   Result Value Ref Range    WBC 7.95 3.40 - 10.80 10*3/mm3    RBC 4.00 3.77 - 5.28 10*6/mm3    Hemoglobin 10.8 (L) 12.0 - 15.9 g/dL    Hematocrit 34.7 34.0 - 46.6 %    MCV 86.8 79.0 - 97.0 fL    MCH 27.0 26.6 - 33.0 pg    MCHC 31.1 (L) 31.5 - 35.7 g/dL    RDW 14.8 12.3 - 15.4 %    RDW-SD 46.1 37.0 - 54.0 fl    MPV 10.7 6.0 - 12.0 fL    Platelets 359 140 - 450 10*3/mm3    Neutrophil % 55.2 42.7 - 76.0 %    Lymphocyte % 37.7 19.6 - 45.3 %    Monocyte % 4.8 (L) 5.0 - 12.0 %    Eosinophil % 1.4 0.3 - 6.2 %    Basophil % 0.6 0.0 - 1.5 %    Immature Grans % 0.3 0.0 - 0.5 %    Neutrophils, Absolute 4.39 1.70 - 7.00 10*3/mm3    Lymphocytes, Absolute 3.00 0.70 - 3.10 10*3/mm3    Monocytes, Absolute 0.38 0.10 - 0.90 10*3/mm3    Eosinophils, Absolute 0.11 0.00 - 0.40 10*3/mm3    Basophils, Absolute 0.05 0.00 - 0.20 10*3/mm3    Immature Grans, Absolute 0.02 0.00 - 0.05 10*3/mm3    nRBC 0.0 0.0 - 0.2 /100 WBC   POCT urinalysis dipstick, automated    Collection Time: 08/25/22  2:10 PM    Specimen: Urine   Result Value Ref Range    Color Yellow Yellow, Straw, Dark Yellow, Zee    Clarity, UA Clear Clear    Specific Gravity  1.025 1.005 - 1.030    pH, Urine 5.5 5.0 - 8.0    Leukocytes Negative Negative    Nitrite, UA Negative Negative    Protein, POC Trace (A) Negative mg/dL    Glucose, UA Negative Negative mg/dL    Ketones, UA Negative Negative    Urobilinogen, UA 0.2 E.U./dL Normal, 0.2 E.U./dL    Bilirubin  "Negative Negative    Blood, UA Small (A) Negative    Lot Number [id number]     Expiration Date 2,282,023          Review of Systems    Objective     /85   Pulse 102   Temp 96.6 °F (35.9 °C) (Infrared)   Resp 18   Ht 172.7 cm (68\")   Wt (!) 149 kg (328 lb)   SpO2 97%   BMI 49.87 kg/m²     Physical Exam  Vitals and nursing note reviewed.   Constitutional:       Appearance: She is obese.   HENT:      Head: Normocephalic.      Right Ear: External ear normal.      Left Ear: External ear normal.      Nose: Nose normal.      Mouth/Throat:      Mouth: Mucous membranes are moist.   Eyes:      Conjunctiva/sclera: Conjunctivae normal.      Pupils: Pupils are equal, round, and reactive to light.   Cardiovascular:      Rate and Rhythm: Normal rate.      Heart sounds: Normal heart sounds.   Pulmonary:      Effort: Pulmonary effort is normal.      Breath sounds: Normal breath sounds.   Chest:   Breasts:      Right: Mass present. No swelling, bleeding, inverted nipple, nipple discharge, skin change, axillary adenopathy or supraclavicular adenopathy.      Left: Mass and tenderness present. No swelling, bleeding, inverted nipple, nipple discharge, skin change, axillary adenopathy or supraclavicular adenopathy.         Abdominal:      General: Bowel sounds are normal.      Palpations: Abdomen is soft.   Musculoskeletal:      Cervical back: Neck supple.   Lymphadenopathy:      Upper Body:      Right upper body: No supraclavicular, axillary or pectoral adenopathy.      Left upper body: No supraclavicular, axillary or pectoral adenopathy.   Neurological:      Mental Status: She is alert.         Result Review :                Assessment & Plan    Diagnoses and all orders for this visit:    1. Mass of lower outer quadrant of left breast (Primary)  Comments:  ultrasound ordered.   Orders:  -     US Breast Bilateral Limited; Future    2. Mass overlapping multiple quadrants of right breast  Comments:  ultrasound ordered  Orders:  - "     US Breast Bilateral Limited; Future    3. Secondary hypertension  Comments:  stable    4. Anxiety  Comments:  stable       Patient Instructions   You have to make phone calls with psych.  Continue current medications.   Follow up after breast ultrasounds      Follow Up   Return in about 6 months (around 3/22/2023).    Patient was given instructions and counseling regarding her condition or for health maintenance advice. Please see specific information pulled into the AVS if appropriate.     Jade Whitlock, CHAUNCEY    09/22/22

## 2022-09-22 NOTE — PATIENT INSTRUCTIONS
You have to make phone calls with psych.  Continue current medications.   Follow up after breast ultrasounds

## 2022-10-14 ENCOUNTER — HOSPITAL ENCOUNTER (OUTPATIENT)
Dept: ULTRASOUND IMAGING | Facility: HOSPITAL | Age: 27
End: 2022-10-14

## 2022-10-20 ENCOUNTER — HOSPITAL ENCOUNTER (OUTPATIENT)
Dept: ULTRASOUND IMAGING | Facility: HOSPITAL | Age: 27
Discharge: HOME | End: 2022-10-20
Admitting: NURSE PRACTITIONER

## 2022-10-20 DIAGNOSIS — N63.23 MASS OF LOWER OUTER QUADRANT OF LEFT BREAST: ICD-10-CM

## 2022-10-20 DIAGNOSIS — N63.15 MASS OVERLAPPING MULTIPLE QUADRANTS OF RIGHT BREAST: ICD-10-CM

## 2022-10-20 PROCEDURE — 76642 ULTRASOUND BREAST LIMITED: CPT

## 2022-11-10 ENCOUNTER — TELEPHONE (OUTPATIENT)
Dept: FAMILY MEDICINE CLINIC | Facility: CLINIC | Age: 27
End: 2022-11-10

## 2022-11-10 RX ORDER — ESCITALOPRAM OXALATE 20 MG/1
20 TABLET ORAL DAILY
Status: CANCELLED | OUTPATIENT
Start: 2022-11-10

## 2022-11-10 RX ORDER — BUSPIRONE HYDROCHLORIDE 10 MG/1
10 TABLET ORAL 2 TIMES DAILY
Status: CANCELLED | OUTPATIENT
Start: 2022-11-10

## 2022-11-10 NOTE — TELEPHONE ENCOUNTER
PATIENT CALLED FOR NEW PRESCRIPTION OF  busPIRone (BUSPAR) 10 MG tablet  SHE IS OUT OF MEDICATION     escitalopram (LEXAPRO) 20 MG tablet  SHE HAS TWO TABLETS LEFT    [facility] [address] [place], [place] [address], [place] - [address] [facility] [address]  - [phone]  - [phone]   [phone]    HER PSYCHOLOGIST IS THE ONE WHO USUALLY PRESCRIBES THESE MEDICATIONS, HE HAS LEFT THE PRACTICE AND SHE NOW HAS AN APPOINTMENT WITH NEW ONE ON 1/12/23    CALL BACK NUMBER [phone]

## 2022-11-11 RX ORDER — BUSPIRONE HYDROCHLORIDE 10 MG/1
10 TABLET ORAL 2 TIMES DAILY
Qty: 60 TABLET | Refills: 1 | Status: SHIPPED | OUTPATIENT
Start: 2022-11-11

## 2022-11-11 RX ORDER — ESCITALOPRAM OXALATE 20 MG/1
20 TABLET ORAL DAILY
Qty: 30 TABLET | Refills: 1 | Status: SHIPPED | OUTPATIENT
Start: 2022-11-11

## 2022-11-11 NOTE — TELEPHONE ENCOUNTER
Informed MsHayder Nilsa her medication will be filled until January 2023 but must be filled by psych thereafter. She verbalizes understanding.

## 2023-03-09 ENCOUNTER — TELEPHONE (OUTPATIENT)
Dept: BARIATRICS/WEIGHT MGMT | Facility: CLINIC | Age: 28
End: 2023-03-09
Payer: MEDICAID

## 2023-03-20 NOTE — PROGRESS NOTES
"Nutrition Services    Patient Name: Brandi Ash  YOB: 1995  MRN: [id number]  Date of Service: 23      ICD-10-CM ICD-9-CM   1. Morbid obesity (HCC)  E66.01 278.01        RD Recommendation        Candidacy for surgery? Yes   RD Comments Patient is a good candidate for surgery     NUTRITION ASSESSMENT - BARIATRIC SURGERY      Reason for Visit Intake 3/28 & SWL #1, RDN Eval for surgery     H&P      Past Medical History:   Diagnosis Date   • Anxiety    • Kidney stone    • PCOS (polycystic ovarian syndrome)        Past Surgical History:   Procedure Laterality Date   •  SECTION     • CYSTOSCOPY W/ LASER LITHOTRIPSY     • ENDOMETRIAL BIOPSY     • TONSILLECTOMY                    Encounter Information        Visit Narrative     3/28: WFH 4 days/week. Tired all the time - takes naps throughout the day. Working on house which is providing some activity    Diet Recall:   Breakfast: skips  Lunch: skips  Dinner: cooking at home, sometimes out or picks up  Snacks: chips, pretzels, veggie straws, ice cream cone  Beverages: regular soda, carbonated water    Exercise: No exercise currently    Supplements: no MV     Self Monitoring: No current tracking         Anthropometrics        Current Height, Weight Height: 172.7 cm (68\")  Weight: (!) 156 kg (345 lb) (23 1152)            Wt Readings from Last 30 Encounters:   23 1152 (!) 156 kg (345 lb)   23 0946 (!) 157 kg (345 lb 6.4 oz)   22 1446 (!) 149 kg (328 lb)   22 1359 (!) 147 kg (323 lb)   22 1135 (!) 150 kg (330 lb)   11/15/21 1754 (!) 154 kg (340 lb)   11/15/21 1750 (!) 154 kg (340 lb)   21 2156 (!) 152 kg (334 lb 14.1 oz)   09/10/21 1443 (!) 154 kg (340 lb 6.2 oz)   21 1205 (!) 154 kg (339 lb 8.1 oz)   20 1332 (!) 147 kg (325 lb)   10/27/19 2349 (!) 147 kg (324 lb 8.3 oz)   19 1108 (!) 146 kg (320 lb 14.4 oz)   19 1332 (!) 148 kg (325 lb 2.9 oz)   07/15/19 2016 (!) 148 kg " (325 lb 6.4 oz)   05/17/19 1432 (!) 148 kg (327 lb)   08/24/18 1120 (!) 147 kg (323 lb 6.4 oz)   04/24/18 1058 (!) 147 kg (323 lb 9.6 oz)   03/02/18 1033 (!) 144 kg (318 lb)   01/04/18 1017 (!) 145 kg (320 lb)   12/15/17 0956 (!) 145 kg (320 lb)   10/26/17 1132 (!) 143 kg (315 lb 3.2 oz)   08/10/17 0918 (!) 141 kg (311 lb 9.6 oz)   05/05/17 1355 (!) 141 kg (310 lb 8 oz)   04/11/17 1106 (!) 140 kg (309 lb)   03/01/17 0832 (!) 137 kg (301 lb)   01/30/17 0854 136 kg (299 lb 6.4 oz)   12/30/16 0842 (!) 138 kg (303 lb 6.4 oz)   12/01/16 1320 (!) 139 kg (305 lb 9.6 oz)   08/25/16 1649 (!) 139 kg (306 lb)   03/28/16 0922 (!) 141 kg (311 lb 6.4 oz)      BMI kg/m2 Body mass index is 52.46 kg/m².       Nutrition Diagnosis         Nutrition Dx Statement Overweight/obesity RT multifactorial biochemical, behavioral and environmental contributors to disease AEB BMI 52.46kg/m^2         Nutrition Intervention         Nutrition Intervention Nutrition education related to diet modifications and physical activity       Monitor/Evaluation        New Goals 1. Increase water intake   2. 15 minutes of exercise 1-2 days each week  3. Make meal times 15 minutes        Total time spent with pt 30 minutes of which 30 minutes were spent on education.       Electronically signed by:  Pravin Briones RD  03/28/23 11:53 EDT

## 2023-03-27 NOTE — PROGRESS NOTES
[facility] BARIATRIC SURGERY   [address] [place] [address]   [address] [place] [address]  Dept: [phone]  3/27/2023      Brandi Ash.  [id number]  [id number]  1995  female      Chief Complaint of weight gain; unable to maintain weight loss    History of Present Illness:   Brandi is a 27 y.o. female who presents today for evaluation, education and consultation regarding weight loss surgery. The patient is interested in the sleeve gastrectomy.      Diet History:See dietician/RN/MA documentation for complete history of weight and diet.     Bariatric Surgery Evaluation: The patient is being seen for an initial visit for bariatric surgery evaluation.       Patient Active Problem List   Diagnosis   • Mass overlapping multiple quadrants of right breast   • Abnormal vaginal bleeding   • Acanthosis nigricans   • ADHD   • Amenorrhea   • Anxiety   • Morbid obesity with body mass index (BMI) of 40.0 to 49.9 ([facility])   • Elevated blood pressure reading without diagnosis of hypertension   • Contraception management   • Heartburn   • History of renal calculi   • Impaired glucose tolerance   • Knee pain, right   • Morbid obesity ([facility])   • Panic disorder with agoraphobia   • Polycystic ovarian syndrome   • Posttraumatic stress disorder   • Tinea corporis   • Need for hepatitis C screening test   • Vaginal yeast infection   • Impaired fasting glucose   • Urinary frequency   • Secondary hypertension   • Mass of lower outer quadrant of left breast       Past Medical History:   Diagnosis Date   • Anxiety    • Kidney stone    • PCOS (polycystic ovarian syndrome)        Past Surgical History:   Procedure Laterality Date   •  SECTION     • CYSTOSCOPY W/ LASER LITHOTRIPSY     • ENDOMETRIAL BIOPSY     • TONSILLECTOMY         No Known Allergies      Current Outpatient Medications:   •  busPIRone (BUSPAR) 10 MG tablet, Take 1 tablet by mouth 2  "(Two) Times a Day., Disp: 60 tablet, Rfl: 1  •  EluRyng 0.12-0.015 MG/24HR vaginal ring, Currently using- about to switch to PO med, Disp: , Rfl:   •  escitalopram (LEXAPRO) 20 MG tablet, Take 1 tablet by mouth Daily., Disp: 30 tablet, Rfl: 1  •  FeroSul 325 (65 Fe) MG tablet, With vitamin C, Disp: , Rfl:   •  hydrOXYzine pamoate (VISTARIL) 25 MG capsule, 4 (Four) Times a Day., Disp: , Rfl:   •  Dagoberto Fe  1-20 MG-MCG per tablet, , Disp: , Rfl:   •  metoprolol tartrate (LOPRESSOR) 50 MG tablet, Take 1 tablet by mouth 2 (Two) Times a Day., Disp: 180 tablet, Rfl: 0    Social History     Socioeconomic History   • Marital status:    Tobacco Use   • Smoking status: Former     Packs/day: 0.50     Years: 10.00     Pack years: 5.00     Types: Cigarettes     Quit date:      Years since quittin.2   • Smokeless tobacco: Never   Vaping Use   • Vaping Use: Every day   • Substances: Nicotine, Flavoring   • Devices: Disposable   • Passive vaping exposure: Yes   Substance and Sexual Activity   • Alcohol use: Not Currently     Comment: social   • Drug use: No   • Sexual activity: Yes     Partners: Male       Family History   Problem Relation Age of Onset   • Hypertension Mother    • Obesity Mother    • Sleep apnea Mother    • Obesity Maternal Grandmother    • Hypertension Maternal Grandmother    • Sleep apnea Maternal Grandmother          Review of Systems:  Review of Systems   Constitutional:        Weight gain, fatigue    HENT:        Wears contacts/ glasses    Respiratory:        Snoring   Cardiovascular:        HTN   Gastrointestinal: Positive for diarrhea.   Endocrine:        \" pre diabetes\", abnormal facial hair, PCOS, excessive thirst, excessive urination    Genitourinary:        Kidney stones    Musculoskeletal:        Myalgia, neck and back pain   Skin: Negative.    Neurological: Negative.    Hematological: Negative.    Psychiatric/Behavioral:        Anxiety, seen a psychiatrist or counselor  "       Physical Exam:  Vital Signs:        Height 68 inches, weight 345 pounds, BMI 52.46            Physical Exam  Constitutional:       Appearance: Normal appearance. She is obese.   Cardiovascular:      Rate and Rhythm: Normal rate.      Pulses: Normal pulses.      Heart sounds: Normal heart sounds.   Pulmonary:      Effort: Pulmonary effort is normal.      Breath sounds: Normal breath sounds.   Abdominal:      General: Abdomen is flat. Bowel sounds are normal.      Palpations: Abdomen is soft.   Skin:     General: Skin is warm and dry.   Neurological:      General: No focal deficit present.      Mental Status: She is alert and oriented to person, place, and time.   Psychiatric:         Mood and Affect: Mood normal.         Behavior: Behavior normal.         Thought Content: Thought content normal.         Judgment: Judgment normal.            Assessment:         Brandi Ash is a 27 y.o. year old female with medically complicated severe obesity.  ,BMI 52.46 and weight related problems.    I explained in detail the procedures that we are performing.  All of those procedures can be performed laparoscopically but there is a chance to convert to open if any technical challenges or complications do occur.  Bariatric surgery is not cosmetic surgery but rather a tool to help a patient make a life-long commitment lifestyle changes including diet, exercise, behavior changes, and taking supplemental vitamins and minerals.    Due to the patient's BMI and co-morbidities they are at a high risk for surgery and will obtain the following:  The patient has been advised that a letter of medical support and a history and physical must be obtained from her primary care physician. A psychological evaluation will be arranged for this patient. CBC, CMP,  TSH and HgbA1C will be drawn. Brandi Ash will obtain a pre-operative CXR and EKG. Brandi Ash will obtain clearance from a cardiologist prior to surgery.      Brandi Ash will be set up for a pre-operative diagnostic esophagogastroduodenoscopy with biopsy for evaluation. The risks and benefits of the procedure were discussed with the patient in detail and all questions were answered.  Possibility of perforation, bleeding, aspiration, anoxic brain injury, respiratory and/or cardiac arrest and death were discussed.   She received handouts regarding, all questions were answered and informed consent was obtained.     The risks, benefits, alternatives, and potential complications of all of the procedures were explained in detail including, but not limited to death, anesthesia and medication adverse effect/DVT, pulmonary embolism, trocar site/incisional hernia, wound infection, abdominal infection, bleeding, failure to lose weight or gain weight and change in body image, metabolic complications with calcium, thiamine, vitamin B12, folate, iron, and anemia.    The patient was advised to start a high protein, low fat and low carbohydrate diet. The patient was given individualized information  along with general group information and handouts.     The patient was encouraged to start routine exercise including but not limited to 150 minutes per week.     The consultation plan was reviewed with the patient.    The patient understands the surgical procedures and the different surgical options that are available.  She understands the lifestyle changes that would be required after surgery and has agreed to participate in a pre-operative and postoperative weight management program.  She also expressed understanding of possible risks, had several questions answered and desires to proceed.    I think she is a good candidate for this surgery, and is interested in a sleeve gastrectomy.      Plan:    Patient will have evaluations and follow up with bariatric dieticians and a psychologist before undergoing a multidisciplinary review of her candidacy.  We also discussed the weight  loss requirement and rationale, and other program requirements.    Pt will need an EGD prior to bariatric surgery. Pt will also need to stop smoking/ vaping a minimum of 30 days before sx. Plan to follow up for EGD.     Total time spent with pt 60 minutes of which 45 minutes were spent on education.       Michelle Enrique, APRN  3/27/2023

## 2023-03-28 ENCOUNTER — PREP FOR SURGERY (OUTPATIENT)
Dept: OTHER | Facility: HOSPITAL | Age: 28
End: 2023-03-28
Payer: MEDICAID

## 2023-03-28 ENCOUNTER — CONSULT (OUTPATIENT)
Dept: BARIATRICS/WEIGHT MGMT | Facility: CLINIC | Age: 28
End: 2023-03-28
Payer: MEDICAID

## 2023-03-28 ENCOUNTER — OFFICE VISIT (OUTPATIENT)
Dept: PSYCHIATRY | Facility: CLINIC | Age: 28
End: 2023-03-28
Payer: MEDICAID

## 2023-03-28 ENCOUNTER — OFFICE VISIT (OUTPATIENT)
Dept: BARIATRICS/WEIGHT MGMT | Facility: CLINIC | Age: 28
End: 2023-03-28
Payer: MEDICAID

## 2023-03-28 VITALS
BODY MASS INDEX: 44.41 KG/M2 | DIASTOLIC BLOOD PRESSURE: 91 MMHG | SYSTOLIC BLOOD PRESSURE: 136 MMHG | RESPIRATION RATE: 16 BRPM | WEIGHT: 293 LBS | OXYGEN SATURATION: 98 % | HEIGHT: 68 IN | HEART RATE: 94 BPM

## 2023-03-28 VITALS — WEIGHT: 293 LBS | BODY MASS INDEX: 44.41 KG/M2 | HEIGHT: 68 IN

## 2023-03-28 DIAGNOSIS — Z01.818 PRE-OPERATIVE EXAMINATION: ICD-10-CM

## 2023-03-28 DIAGNOSIS — E66.01 MORBID OBESITY: Primary | ICD-10-CM

## 2023-03-28 DIAGNOSIS — F41.1 GENERALIZED ANXIETY DISORDER: ICD-10-CM

## 2023-03-28 DIAGNOSIS — Z01.818 PRE-OPERATIVE CLEARANCE: ICD-10-CM

## 2023-03-28 DIAGNOSIS — F40.01 PANIC DISORDER WITH AGORAPHOBIA: ICD-10-CM

## 2023-03-28 DIAGNOSIS — F43.10 POSTTRAUMATIC STRESS DISORDER: ICD-10-CM

## 2023-03-28 DIAGNOSIS — Z01.818 PRE-OP EXAM: ICD-10-CM

## 2023-03-28 DIAGNOSIS — E66.01 CLASS 3 OBESITY: Primary | ICD-10-CM

## 2023-03-28 PROCEDURE — 90792 PSYCH DIAG EVAL W/MED SRVCS: CPT | Performed by: PSYCHIATRY & NEUROLOGY

## 2023-03-28 PROCEDURE — 99215 OFFICE O/P EST HI 40 MIN: CPT | Performed by: NURSE PRACTITIONER

## 2023-03-28 RX ORDER — SODIUM CHLORIDE 9 MG/ML
30 INJECTION, SOLUTION INTRAVENOUS CONTINUOUS PRN
OUTPATIENT
Start: 2023-03-28

## 2023-03-28 RX ORDER — BUSPIRONE HYDROCHLORIDE 10 MG/1
TABLET ORAL EVERY 12 HOURS SCHEDULED
COMMUNITY
End: 2023-03-28 | Stop reason: SDUPTHER

## 2023-03-28 RX ORDER — LORAZEPAM 0.5 MG/1
0.5 TABLET ORAL 2 TIMES DAILY
Qty: 60 TABLET | Refills: 2 | Status: SHIPPED | OUTPATIENT
Start: 2023-03-28

## 2023-03-28 RX ORDER — SODIUM CHLORIDE 0.9 % (FLUSH) 0.9 %
10 SYRINGE (ML) INJECTION AS NEEDED
OUTPATIENT
Start: 2023-03-28
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
## 2023-04-06 ENCOUNTER — PRIOR AUTHORIZATION (OUTPATIENT)
Dept: PSYCHIATRY | Facility: CLINIC | Age: 28
End: 2023-04-06
Payer: MEDICAID

## 2023-04-06 NOTE — TELEPHONE ENCOUNTER
PA for lorazepam 0.5 mg tablets BID submitted to [facility] [place] [employer].    PA denied, but they will fill two 15 day fills in a 90 day period.

## 2023-04-11 NOTE — PROGRESS NOTES
"Nutrition Services    Patient Name: Brandi Ash  YOB: 1995  MRN: [id number]  Date of Service: 23      ICD-10-CM ICD-9-CM   1. Super obese  E66.9 278.00        NUTRITION ASSESSMENT - BARIATRIC SURGERY      Reason for Visit SWL #2/6     H&P      Past Medical History:   Diagnosis Date   • Anxiety    • Kidney stone    • PCOS (polycystic ovarian syndrome)        Past Surgical History:   Procedure Laterality Date   •  SECTION     • CYSTOSCOPY W/ LASER LITHOTRIPSY     • ENDOMETRIAL BIOPSY     • TONSILLECTOMY          Previous Goals   1. Increase water intake - met  2. 15 minutes of exercise 1-2 days each week - working towards  3. Make meal times 15 minutes - working towards  4. Patient has labs to be completed - patient aware and planning to get on Tuesday     Encounter Information        Visit Narrative     Patient has been working towards goals. Patient doing very well with water and planning exercise. No current questions    Diet Recall:   Breakfast: eggs & sausage vale, protein shake  Lunch: chicken & veggies  Dinner: salads with chicken and fruit  Snacks: yasso bars, chobani flips  Beverages: water - at least 52-78oz each day, still craves big red sometimes     Exercise: walking 15-30 minutes 2 days each week    Supplements: not taking MV     Self Monitoring: using arturo to track         Anthropometrics        Current Height, Weight Height: 172.7 cm (68\")  Weight: (!) 155 kg (341 lb) (23 1433)            Wt Readings from Last 30 Encounters:   23 1433 (!) 155 kg (341 lb)   23 1152 (!) 156 kg (345 lb)   23 0946 (!) 157 kg (345 lb 6.4 oz)   22 1446 (!) 149 kg (328 lb)   22 1359 (!) 147 kg (323 lb)   22 1135 (!) 150 kg (330 lb)   11/15/21 1754 (!) 154 kg (340 lb)   11/15/21 1750 (!) 154 kg (340 lb)   21 2156 (!) 152 kg (334 lb 14.1 oz)   09/10/21 1443 (!) 154 kg (340 lb 6.2 oz)   21 1205 (!) 154 kg (339 lb 8.1 oz)   20 " 1332 (!) 147 kg (325 lb)   10/27/19 2349 (!) 147 kg (324 lb 8.3 oz)   07/26/19 1108 (!) 146 kg (320 lb 14.4 oz)   07/17/19 1332 (!) 148 kg (325 lb 2.9 oz)   07/15/19 2016 (!) 148 kg (325 lb 6.4 oz)   05/17/19 1432 (!) 148 kg (327 lb)   08/24/18 1120 (!) 147 kg (323 lb 6.4 oz)   04/24/18 1058 (!) 147 kg (323 lb 9.6 oz)   03/02/18 1033 (!) 144 kg (318 lb)   01/04/18 1017 (!) 145 kg (320 lb)   12/15/17 0956 (!) 145 kg (320 lb)   10/26/17 1132 (!) 143 kg (315 lb 3.2 oz)   08/10/17 0918 (!) 141 kg (311 lb 9.6 oz)   05/05/17 1355 (!) 141 kg (310 lb 8 oz)   04/11/17 1106 (!) 140 kg (309 lb)   03/01/17 0832 (!) 137 kg (301 lb)   01/30/17 0854 136 kg (299 lb 6.4 oz)   12/30/16 0842 (!) 138 kg (303 lb 6.4 oz)   12/01/16 1320 (!) 139 kg (305 lb 9.6 oz)   08/25/16 1649 (!) 139 kg (306 lb)      BMI kg/m2 Body mass index is 51.85 kg/m².       Nutrition Diagnosis         Nutrition Dx Statement Overweight/obesity RT multifactorial biochemical, behavioral and environmental contributors to disease AEB BMI 51.85kg/m^2         Nutrition Intervention         Nutrition Intervention Nutrition education related to diet modification and physical activity       Monitor/Evaluation        New Goals 1. 15 - 30 minutes of exercise 2-3 days each week  2. Make meal times 15 - 20 minutes       Total time spent with pt 30 minutes of which 30 minutes were spent on education.       Electronically signed by:  Pravin Briones RD  04/13/23 14:34 EDT

## 2023-04-13 ENCOUNTER — OFFICE VISIT (OUTPATIENT)
Dept: BARIATRICS/WEIGHT MGMT | Facility: CLINIC | Age: 28
End: 2023-04-13
Payer: MEDICAID

## 2023-04-13 VITALS — BODY MASS INDEX: 44.41 KG/M2 | WEIGHT: 293 LBS | HEIGHT: 68 IN

## 2023-04-13 DIAGNOSIS — E66.9 SUPER OBESE: Primary | ICD-10-CM

## 2023-04-25 ENCOUNTER — LAB (OUTPATIENT)
Dept: LAB | Facility: HOSPITAL | Age: 28
End: 2023-04-25
Payer: MEDICAID

## 2023-04-25 DIAGNOSIS — E66.01 CLASS 3 OBESITY: ICD-10-CM

## 2023-04-25 LAB
25(OH)D3 SERPL-MCNC: 22.3 NG/ML (ref 30–100)
ALBUMIN SERPL-MCNC: 4 G/DL (ref 3.5–5.2)
ALBUMIN/GLOB SERPL: 1.3 G/DL
ALP SERPL-CCNC: 81 U/L (ref 39–117)
ALT SERPL W P-5'-P-CCNC: 29 U/L (ref 1–33)
ANION GAP SERPL CALCULATED.3IONS-SCNC: 8.6 MMOL/L (ref 5–15)
AST SERPL-CCNC: 18 U/L (ref 1–32)
BASOPHILS # BLD AUTO: 0.07 10*3/MM3 (ref 0–0.2)
BASOPHILS NFR BLD AUTO: 0.6 % (ref 0–1.5)
BILIRUB SERPL-MCNC: <0.2 MG/DL (ref 0–1.2)
BUN SERPL-MCNC: 13 MG/DL (ref 6–20)
BUN/CREAT SERPL: 16.5 (ref 7–25)
CALCIUM SPEC-SCNC: 9.9 MG/DL (ref 8.6–10.5)
CHLORIDE SERPL-SCNC: 104 MMOL/L (ref 98–107)
CO2 SERPL-SCNC: 26.4 MMOL/L (ref 22–29)
CREAT SERPL-MCNC: 0.79 MG/DL (ref 0.57–1)
DEPRECATED RDW RBC AUTO: 42.5 FL (ref 37–54)
EGFRCR SERPLBLD CKD-EPI 2021: 104.6 ML/MIN/1.73
EOSINOPHIL # BLD AUTO: 0.13 10*3/MM3 (ref 0–0.4)
EOSINOPHIL NFR BLD AUTO: 1.1 % (ref 0.3–6.2)
ERYTHROCYTE [DISTWIDTH] IN BLOOD BY AUTOMATED COUNT: 14.7 % (ref 12.3–15.4)
GLOBULIN UR ELPH-MCNC: 3.1 GM/DL
GLUCOSE SERPL-MCNC: 105 MG/DL (ref 65–99)
HBA1C MFR BLD: 6.1 % (ref 4.8–5.6)
HCT VFR BLD AUTO: 35.7 % (ref 34–46.6)
HGB BLD-MCNC: 11.7 G/DL (ref 12–15.9)
IMM GRANULOCYTES # BLD AUTO: 0.03 10*3/MM3 (ref 0–0.05)
IMM GRANULOCYTES NFR BLD AUTO: 0.3 % (ref 0–0.5)
LYMPHOCYTES # BLD AUTO: 5.4 10*3/MM3 (ref 0.7–3.1)
LYMPHOCYTES NFR BLD AUTO: 46.8 % (ref 19.6–45.3)
MCH RBC QN AUTO: 26.1 PG (ref 26.6–33)
MCHC RBC AUTO-ENTMCNC: 32.8 G/DL (ref 31.5–35.7)
MCV RBC AUTO: 79.7 FL (ref 79–97)
MONOCYTES # BLD AUTO: 0.66 10*3/MM3 (ref 0.1–0.9)
MONOCYTES NFR BLD AUTO: 5.7 % (ref 5–12)
NEUTROPHILS NFR BLD AUTO: 45.5 % (ref 42.7–76)
NEUTROPHILS NFR BLD AUTO: 5.25 10*3/MM3 (ref 1.7–7)
NRBC BLD AUTO-RTO: 0 /100 WBC (ref 0–0.2)
PLATELET # BLD AUTO: 392 10*3/MM3 (ref 140–450)
PMV BLD AUTO: 10.6 FL (ref 6–12)
POTASSIUM SERPL-SCNC: 4.4 MMOL/L (ref 3.5–5.2)
PROT SERPL-MCNC: 7.1 G/DL (ref 6–8.5)
RBC # BLD AUTO: 4.48 10*6/MM3 (ref 3.77–5.28)
SODIUM SERPL-SCNC: 139 MMOL/L (ref 136–145)
TSH SERPL DL<=0.05 MIU/L-ACNC: 1.74 UIU/ML (ref 0.27–4.2)
WBC NRBC COR # BLD: 11.54 10*3/MM3 (ref 3.4–10.8)

## 2023-04-25 PROCEDURE — 36415 COLL VENOUS BLD VENIPUNCTURE: CPT

## 2023-04-25 PROCEDURE — 82306 VITAMIN D 25 HYDROXY: CPT

## 2023-04-25 PROCEDURE — 83036 HEMOGLOBIN GLYCOSYLATED A1C: CPT

## 2023-04-25 PROCEDURE — 80050 GENERAL HEALTH PANEL: CPT

## 2023-04-27 RX ORDER — ERGOCALCIFEROL 1.25 MG/1
50000 CAPSULE ORAL
Qty: 12 CAPSULE | Refills: 1 | Status: SHIPPED | OUTPATIENT
Start: 2023-04-27

## 2023-04-28 ENCOUNTER — TELEPHONE (OUTPATIENT)
Dept: BARIATRICS/WEIGHT MGMT | Facility: CLINIC | Age: 28
End: 2023-04-28
Payer: MEDICAID

## 2023-04-28 NOTE — TELEPHONE ENCOUNTER
----- Message from CHAUNCEY Hill sent at 4/27/2023  9:45 AM EDT -----  Vitamin d low. I am going to send her in some weekly vitamin d to start taking. Glucose slightly elevated. Hgb a1c slightly elevated as well. Leukocytosis noted on labs as well. Will recheck at pre op. Hgb slightly low. Other labs look ok overall

## 2023-04-28 NOTE — TELEPHONE ENCOUNTER
Attempted call to patient cell no answer VM full. Attempted call to home number no answer, no option for VM.  MN

## 2023-04-28 NOTE — TELEPHONE ENCOUNTER
Brandi returned call. I review the notes from Michelle about lab work and to start Vit D. Brandi states she picked up the Vit D already and verbalized understanding

## 2023-05-03 ENCOUNTER — TELEPHONE (OUTPATIENT)
Dept: BARIATRICS/WEIGHT MGMT | Facility: CLINIC | Age: 28
End: 2023-05-03
Payer: MEDICAID

## 2023-05-11 NOTE — PROGRESS NOTES
"Nutrition Services    Patient Name: Brandi Ash  YOB: 1995  MRN: [id number]  Date of Service: 23      ICD-10-CM ICD-9-CM   1. Super obese  E66.9 278.00        NUTRITION ASSESSMENT - BARIATRIC SURGERY      Reason for Visit SWL 3/6     H&P      Past Medical History:   Diagnosis Date   • Anxiety    • Kidney stone    • PCOS (polycystic ovarian syndrome)        Past Surgical History:   Procedure Laterality Date   •  SECTION     • CYSTOSCOPY W/ LASER LITHOTRIPSY     • ENDOMETRIAL BIOPSY     • TONSILLECTOMY          Previous Goals   1. 15 - 30 minutes of exercise 2-3 days each week - met  2. Make meal times 15 - 20 minutes - working on      Encounter Information        Visit Narrative     Patient stopped vaping around 2 weeks ago. Will get urine nicotine test when able. Patient working on extending meal times. Still planning out meals. Drinking slim fast protein shakes, wants to try some other flavors.     Diet Recall:   Breakfast:   Lunch:   Dinner:   Snacks: fruit or quest brownie  Beverages:     Exercise: walking and swimming laps 2 days each week, sometimes 3    Supplements:    Self Monitoring: tracking on arturo         Anthropometrics        Current Height, Weight Height: 172.7 cm (68\")  Weight: (!) 158 kg (348 lb) (23 1251)            Wt Readings from Last 30 Encounters:   23 1251 (!) 158 kg (348 lb)   23 1433 (!) 155 kg (341 lb)   23 1152 (!) 156 kg (345 lb)   23 0946 (!) 157 kg (345 lb 6.4 oz)   22 1446 (!) 149 kg (328 lb)   22 1359 (!) 147 kg (323 lb)   22 1135 (!) 150 kg (330 lb)   11/15/21 1754 (!) 154 kg (340 lb)   11/15/21 1750 (!) 154 kg (340 lb)   21 2156 (!) 152 kg (334 lb 14.1 oz)   09/10/21 1443 (!) 154 kg (340 lb 6.2 oz)   21 1205 (!) 154 kg (339 lb 8.1 oz)   11/05/20 1332 (!) 147 kg (325 lb)   10/27/19 2349 (!) 147 kg (324 lb 8.3 oz)   19 1108 (!) 146 kg (320 lb 14.4 oz)   19 1332 (!) 148 kg " (325 lb 2.9 oz)   07/15/19 2016 (!) 148 kg (325 lb 6.4 oz)   05/17/19 1432 (!) 148 kg (327 lb)   08/24/18 1120 (!) 147 kg (323 lb 6.4 oz)   04/24/18 1058 (!) 147 kg (323 lb 9.6 oz)   03/02/18 1033 (!) 144 kg (318 lb)   01/04/18 1017 (!) 145 kg (320 lb)   12/15/17 0956 (!) 145 kg (320 lb)   10/26/17 1132 (!) 143 kg (315 lb 3.2 oz)   08/10/17 0918 (!) 141 kg (311 lb 9.6 oz)   05/05/17 1355 (!) 141 kg (310 lb 8 oz)   04/11/17 1106 (!) 140 kg (309 lb)   03/01/17 0832 (!) 137 kg (301 lb)   01/30/17 0854 136 kg (299 lb 6.4 oz)   12/30/16 0842 (!) 138 kg (303 lb 6.4 oz)   12/01/16 1320 (!) 139 kg (305 lb 9.6 oz)      BMI kg/m2 Body mass index is 52.91 kg/m².       Nutrition Diagnosis         Nutrition Dx Statement Overweight/obesity RT multifactorial biochemical, behavioral and environmental contributors to disease AEB BMI 52.91kg/m^2         Nutrition Intervention         Nutrition Intervention Nutrition education related to diet modification and physical activity       Monitor/Evaluation        New Goals 1. 15 - 30 minutes of exercise 3 days each week  2. Make meal times 15 - 20 minutes  3. Try more protein shakes prior to LRD       Total time spent with pt 30 minutes of which 30 minutes were spent on education.       Electronically signed by:  Pravin Briones RD  05/18/23 12:56 EDT
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
## 2023-05-18 ENCOUNTER — OFFICE VISIT (OUTPATIENT)
Dept: BARIATRICS/WEIGHT MGMT | Facility: CLINIC | Age: 28
End: 2023-05-18
Payer: MEDICAID

## 2023-05-18 ENCOUNTER — OFFICE VISIT (OUTPATIENT)
Dept: PSYCHIATRY | Facility: CLINIC | Age: 28
End: 2023-05-18
Payer: MEDICAID

## 2023-05-18 VITALS — WEIGHT: 293 LBS | BODY MASS INDEX: 44.41 KG/M2 | HEIGHT: 68 IN

## 2023-05-18 DIAGNOSIS — F40.01 PANIC DISORDER WITH AGORAPHOBIA: Primary | ICD-10-CM

## 2023-05-18 DIAGNOSIS — E66.9 SUPER OBESE: Primary | ICD-10-CM

## 2023-05-18 DIAGNOSIS — F43.10 POSTTRAUMATIC STRESS DISORDER: ICD-10-CM

## 2023-06-08 ENCOUNTER — TELEPHONE (OUTPATIENT)
Dept: BARIATRICS/WEIGHT MGMT | Facility: CLINIC | Age: 28
End: 2023-06-08
Payer: MEDICAID

## 2023-06-08 NOTE — TELEPHONE ENCOUNTER
Patient called, states forgot about EGD scheduled for today and already ate. RS procedure to 6.22.2023 @ 12:40 pm, sent instructions via my chart and verbal over phone.

## 2023-06-14 ENCOUNTER — OFFICE VISIT (OUTPATIENT)
Dept: BARIATRICS/WEIGHT MGMT | Facility: CLINIC | Age: 28
End: 2023-06-14
Payer: MEDICAID

## 2023-06-14 VITALS
HEIGHT: 68 IN | HEART RATE: 78 BPM | DIASTOLIC BLOOD PRESSURE: 93 MMHG | OXYGEN SATURATION: 96 % | WEIGHT: 293 LBS | BODY MASS INDEX: 44.41 KG/M2 | SYSTOLIC BLOOD PRESSURE: 166 MMHG

## 2023-06-14 DIAGNOSIS — Z71.3 NUTRITIONAL COUNSELING: ICD-10-CM

## 2023-06-14 DIAGNOSIS — E66.01 CLASS 3 OBESITY: Primary | ICD-10-CM

## 2023-06-14 NOTE — PROGRESS NOTES
[facility]  [address] [place] [address]  [address] [place] [address]  Dept: [phone]  6/14/2023      Brandi Ash.  [id number]  [id number]  1995  female      Chief Complaint   Patient presents with   • Nutrition Counseling     SWL #4/6       The patient is here for month 4 of their pre-operative physician supervised diet. She had a gain of 4 lbs. The patient states that she is following the recommendations given by our office and dietician including a high lean protein, low carb and low fat diet. We recommended adequate fruits and vegetable intake along with limited portion sizes. Patient is working on eliminating fast foods, fried foods, sweets and soda. Brandi Ash has been increasing her daily water intake. She has been exercising: swimming laps in pool for 30 minutes at a time, 3-4 days a week.  Wt Readings from Last 10 Encounters:   06/14/23 (!) 160 kg (352 lb 9.6 oz)   05/18/23 (!) 158 kg (348 lb)   04/13/23 (!) 155 kg (341 lb)   03/28/23 (!) 156 kg (345 lb)   03/28/23 (!) 157 kg (345 lb 6.4 oz)   09/22/22 (!) 149 kg (328 lb)   08/25/22 (!) 147 kg (323 lb)   07/12/22 (!) 150 kg (330 lb)   11/15/21 (!) 154 kg (340 lb)   09/14/21 (!) 152 kg (334 lb 14.1 oz)     Patient states they have made positive changes including increased protein, trying to cut out carbonation and vaping  The patient admits to be struggling with smoking/ vaping cessation    Breakfast: some days no breakfast, eggs and 2 sausage patties, or protein shake slim fast   Lunch: some days no lunch, chicken salad, ham and cheese roll ups and baked cheetos   Dinner: chicken , fish, veggies - green beans, broccoli, carrots cooked, corn, baked potatoe  Snacks: fiber one brownies , quest bars   Drinks: protein shake, flavored water, fizzy water, 1 time a day coke 0   Exercise: pool , swimming laps 30 minutes at a time       Quit vaping for  3 weeks, but then started gaining weight gain and then went back to smoking/ vaping    Review of Systems   Constitutional:  Positive for fatigue.   Respiratory: Negative.     Cardiovascular: Negative.    Gastrointestinal: Negative.    Musculoskeletal: Negative.    Vitals:    23 1426   BP: 166/93   Pulse: 78   SpO2: 96%     Patient Active Problem List   Diagnosis   • Mass overlapping multiple quadrants of right breast   • Abnormal vaginal bleeding   • Acanthosis nigricans   • ADHD   • Amenorrhea   • Anxiety   • BMI 50.0-59.9, adult   • Elevated blood pressure reading without diagnosis of hypertension   • Contraception management   • Heartburn   • History of renal calculi   • Impaired glucose tolerance   • Knee pain, right   • Morbid obesity   • Panic disorder with agoraphobia   • Polycystic ovarian syndrome   • Posttraumatic stress disorder   • Tinea corporis   • Need for hepatitis C screening test   • Vaginal yeast infection   • Impaired fasting glucose   • Urinary frequency   • Secondary hypertension   • Mass of lower outer quadrant of left breast   • Pre-operative examination     Body mass index is 53.61 kg/m².    The following portions of the patient's history were reviewed and updated as appropriate: active problem list, medication list, allergies, social history, notes from last encounter  Past Medical History:   Diagnosis Date   • Anxiety    • DDD (degenerative disc disease), lumbar    • Hypertension    • Kidney stone    • Panic disorder    • PCOS (polycystic ovarian syndrome)    • Pre-diabetes    • Vitamin D deficiency      Past Surgical History:   Procedure Laterality Date   •  SECTION     • CYSTOSCOPY W/ LASER LITHOTRIPSY     • ENDOMETRIAL BIOPSY     • TONSILLECTOMY          Physical Exam  Constitutional:       Appearance: Normal appearance. She is obese.   Pulmonary:      Effort: Pulmonary effort is normal.   Abdominal:      General: Abdomen is flat.      Palpations: Abdomen is soft.    Skin:     General: Skin is warm and dry.   Neurological:      General: No focal deficit present.      Mental Status: She is alert and oriented to person, place, and time.   Psychiatric:         Mood and Affect: Mood normal.         Behavior: Behavior normal.         Thought Content: Thought content normal.         Judgment: Judgment normal.     Discussion/Plan:    New goals:  Vaping cessation- quit date - day of EGD 6/22/23- pt states she is going to try and wean herself down to the 0.0 % nicotine vape  No carbonation by next visit      Obesity/Morbid Obesity: Currently the patient's weight is increased. There are no medications prescribed.Treatment plan includes prescribed diet, prescribed exercise regimen and behavior modification.    I reviewed the appropriate dietary choices with the patient and encouraged the necessary changes. Recommended at least 70 grams of protein per day, around 35 grams of fats and less than 100 grams of carbohydrates. Reviewed calorie intake if patient wanted to calorie count and/or had BMR. Instructed patient to drink half of body weight in ounces per day and exercise a minimum of 150 minutes per week including both cardio and strength training. Discussed the option of keeping a food journal which will help patient become more aware of the nutritional value of foods so they are more prepared after surgery.    The patient was given written materials from our office for education.   I answered all of the patients questions regarding dietary changes, exercise or surgical options.  The patient will follow up in 1 month. The total time spent face to face was 20 minutes with 15 minutes spent counseling.    CHAUNCEY Rosario  [facility]

## 2023-07-24 ENCOUNTER — TELEPHONE (OUTPATIENT)
Dept: BARIATRICS/WEIGHT MGMT | Facility: CLINIC | Age: 28
End: 2023-07-24
Payer: MEDICAID

## 2023-07-24 NOTE — TELEPHONE ENCOUNTER
Tried calling patient, no answer and voicemail full. Patient called back. States plans on completing UNS and UDS before appointment with Dr. Adams on Aug 18. Patient is aware needs to be negative in order to move forward with program/surgery.

## 2023-08-09 ENCOUNTER — OFFICE VISIT (OUTPATIENT)
Dept: BARIATRICS/WEIGHT MGMT | Facility: CLINIC | Age: 28
End: 2023-08-09
Payer: MEDICAID

## 2023-08-09 ENCOUNTER — LAB (OUTPATIENT)
Dept: LAB | Facility: HOSPITAL | Age: 28
End: 2023-08-09
Payer: MEDICAID

## 2023-08-09 VITALS
HEIGHT: 68 IN | DIASTOLIC BLOOD PRESSURE: 85 MMHG | WEIGHT: 293 LBS | HEART RATE: 83 BPM | BODY MASS INDEX: 44.41 KG/M2 | SYSTOLIC BLOOD PRESSURE: 150 MMHG | OXYGEN SATURATION: 98 %

## 2023-08-09 DIAGNOSIS — Z71.3 NUTRITIONAL COUNSELING: ICD-10-CM

## 2023-08-09 DIAGNOSIS — E66.01 CLASS 3 OBESITY: ICD-10-CM

## 2023-08-09 DIAGNOSIS — E66.01 CLASS 3 OBESITY: Primary | ICD-10-CM

## 2023-08-09 LAB
AMPHET+METHAMPHET UR QL: NEGATIVE
BARBITURATES UR QL SCN: NEGATIVE
BENZODIAZ UR QL SCN: NEGATIVE
CANNABINOIDS SERPL QL: NEGATIVE
COCAINE UR QL: NEGATIVE
METHADONE UR QL SCN: NEGATIVE
OPIATES UR QL: NEGATIVE
OXYCODONE UR QL SCN: NEGATIVE

## 2023-08-09 PROCEDURE — 80307 DRUG TEST PRSMV CHEM ANLYZR: CPT

## 2023-08-09 NOTE — PROGRESS NOTES
[facility]  [address] [place] [address]  [address] [place] [address]  Dept: [phone]  8/9/2023      Brandi Ash.  [id number]  [id number]  1995  female      Chief Complaint   Patient presents with    Nutrition Counseling     SWL #6/6   Weight gain overall + 14 pounds     The patient is here for month 6 of their pre-operative physician supervised diet. She had a gain of 2 lbs. The patient states that she is following the recommendations given by our office and dietician including a high lean protein, low carb and low fat diet. We recommended adequate fruits and vegetable intake along with limited portion sizes. Patient is working on eliminating fast foods, fried foods, sweets and soda. Brandi Ash has been increasing her daily water intake. She has been exercising: swimming, walking dog.  Wt Readings from Last 10 Encounters:   08/09/23 (!) 163 kg (359 lb 3.2 oz)   07/13/23 (!) 162 kg (357 lb 9.6 oz)   06/13/23 (!) 159 kg (350 lb)   06/14/23 (!) 160 kg (352 lb 9.6 oz)   05/18/23 (!) 158 kg (348 lb)   04/13/23 (!) 155 kg (341 lb)   03/28/23 (!) 156 kg (345 lb)   03/28/23 (!) 157 kg (345 lb 6.4 oz)   09/22/22 (!) 149 kg (328 lb)   08/25/22 (!) 147 kg (323 lb)     Patient states they have made positive changes including quit smoking/ vaping   The patient admits to be struggling with none     Quit vaping with nicotine July 4th     Breakfast: none usually, omelet or protein shake   Lunch/ dinner: tilapia and chicken, veggies,   Snacks: cheese sticks, quest protein chips, apples, banana , watermelon , halo top ice cream   Drinks: protein shake, water, flavored water, gaterade 0 , no carbonation for 1 month  Exercise: swimming, walking dogs     Past goals:  Eating and drinking separately with 1 meal a day- partially met   Chew food 10-15 times per bite and eat slowly over 30 minutes- met   Continue with no  vaping/ smoking - met quit with nicotine on    Follow up with Dr. Adams for psychiatric clearance on 23        Review of Systems   Constitutional:  Positive for activity change and appetite change.   Respiratory: Negative.     Cardiovascular: Negative.    Gastrointestinal: Negative.    Musculoskeletal:  Positive for back pain and myalgias.   Vitals:    23 0944   BP: 150/85   Pulse: 83   SpO2: 98%       Body mass index is 54.62 kg/mý.    The following portions of the patient's history were reviewed and updated as appropriate: active problem list, medication list, allergies, social history, notes from last encounter  Past Medical History:   Diagnosis Date    Anxiety     DDD (degenerative disc disease), lumbar     Hypertension     Kidney stone     Panic disorder     PCOS (polycystic ovarian syndrome)     Pre-diabetes     Vitamin D deficiency      Past Surgical History:   Procedure Laterality Date     SECTION      CYSTOSCOPY W/ LASER LITHOTRIPSY      ENDOMETRIAL BIOPSY      ENDOSCOPY N/A 2023    Procedure: ESOPHAGOGASTRODUODENOSCOPY with biopsy x1 area;  Surgeon: Cari Smith MD;  Location: [facility] ENDOSCOPY;  Service: General;  Laterality: N/A;  Post- small hiatal hernia    TONSILLECTOMY          Physical Exam  Constitutional:       Appearance: Normal appearance. She is obese.   Pulmonary:      Effort: Pulmonary effort is normal.   Abdominal:      General: Abdomen is flat.      Palpations: Abdomen is soft.   Skin:     General: Skin is warm and dry.   Neurological:      General: No focal deficit present.      Mental Status: She is alert and oriented to person, place, and time.   Psychiatric:         Mood and Affect: Mood normal.         Behavior: Behavior normal.         Thought Content: Thought content normal.         Judgment: Judgment normal.     Discussion/Plan:  Obesity/Morbid Obesity: Currently the patient's weight is increased. There are no medications  prescribed.Treatment plan includes prescribed diet, prescribed exercise regimen and behavior modification.    I reviewed the appropriate dietary choices with the patient and encouraged the necessary changes. Recommended at least 70 grams of protein per day, around 35 grams of fats and less than 100 grams of carbohydrates. Reviewed calorie intake if patient wanted to calorie count and/or had BMR. Instructed patient to drink half of body weight in ounces per day and exercise a minimum of 150 minutes per week including both cardio and strength training. Discussed the option of keeping a food journal which will help patient become more aware of the nutritional value of foods so they are more prepared after surgery.    The patient was given written materials from our office for education.   I answered all of the patients questions regarding dietary changes, exercise or surgical options.  The patient will follow up for pre op. The total time spent face to face was 20 minutes with 15 minutes spent counseling.    Pt is done with CHEY. She is going today to get her urine drug and nicotine screen and is following up with DR. Adams next week for clearance. Plan to send off for insurance approval once these things are complete.     CHAUNCEY Rosario  [facility]

## 2023-08-10 LAB
COTININE UR QL SCN: NEGATIVE NG/ML
Lab: NORMAL

## 2023-08-16 ENCOUNTER — PREP FOR SURGERY (OUTPATIENT)
Dept: OTHER | Facility: HOSPITAL | Age: 28
End: 2023-08-16
Payer: MEDICAID

## 2023-08-16 ENCOUNTER — OFFICE VISIT (OUTPATIENT)
Dept: PSYCHIATRY | Facility: CLINIC | Age: 28
End: 2023-08-16
Payer: COMMERCIAL

## 2023-08-16 DIAGNOSIS — F40.01 PANIC DISORDER WITH AGORAPHOBIA: ICD-10-CM

## 2023-08-16 DIAGNOSIS — E66.01 CLASS 3 OBESITY: Primary | ICD-10-CM

## 2023-08-16 DIAGNOSIS — F43.10 POSTTRAUMATIC STRESS DISORDER: Primary | ICD-10-CM

## 2023-08-16 RX ORDER — SODIUM CHLORIDE 0.9 % (FLUSH) 0.9 %
3-10 SYRINGE (ML) INJECTION AS NEEDED
OUTPATIENT
Start: 2023-08-16

## 2023-08-16 RX ORDER — CHLORHEXIDINE GLUCONATE 0.12 MG/ML
15 RINSE ORAL SEE ADMIN INSTRUCTIONS
OUTPATIENT
Start: 2023-08-16

## 2023-08-16 RX ORDER — SODIUM CHLORIDE, SODIUM LACTATE, POTASSIUM CHLORIDE, CALCIUM CHLORIDE 600; 310; 30; 20 MG/100ML; MG/100ML; MG/100ML; MG/100ML
100 INJECTION, SOLUTION INTRAVENOUS CONTINUOUS
OUTPATIENT
Start: 2023-08-16

## 2023-08-16 RX ORDER — SODIUM CHLORIDE 9 MG/ML
40 INJECTION, SOLUTION INTRAVENOUS AS NEEDED
OUTPATIENT
Start: 2023-08-16

## 2023-08-16 RX ORDER — SCOLOPAMINE TRANSDERMAL SYSTEM 1 MG/1
1 PATCH, EXTENDED RELEASE TRANSDERMAL ONCE
OUTPATIENT
Start: 2023-08-16 | End: 2023-08-16

## 2023-08-16 RX ORDER — PANTOPRAZOLE SODIUM 40 MG/10ML
40 INJECTION, POWDER, LYOPHILIZED, FOR SOLUTION INTRAVENOUS ONCE
OUTPATIENT
Start: 2023-08-16 | End: 2023-08-16

## 2023-08-16 RX ORDER — SODIUM CHLORIDE 0.9 % (FLUSH) 0.9 %
3 SYRINGE (ML) INJECTION EVERY 12 HOURS SCHEDULED
OUTPATIENT
Start: 2023-08-16

## 2023-08-16 RX ORDER — ACETAMINOPHEN 10 MG/ML
1000 INJECTION, SOLUTION INTRAVENOUS ONCE
OUTPATIENT
Start: 2023-08-16 | End: 2023-08-16
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
## 2023-08-18 ENCOUNTER — OFFICE VISIT (OUTPATIENT)
Dept: BARIATRICS/WEIGHT MGMT | Facility: CLINIC | Age: 28
End: 2023-08-18
Payer: MEDICAID

## 2023-08-18 VITALS
HEART RATE: 59 BPM | BODY MASS INDEX: 44.41 KG/M2 | SYSTOLIC BLOOD PRESSURE: 132 MMHG | HEIGHT: 68 IN | OXYGEN SATURATION: 100 % | DIASTOLIC BLOOD PRESSURE: 88 MMHG | WEIGHT: 293 LBS

## 2023-08-18 DIAGNOSIS — E66.01 CLASS 3 OBESITY: Primary | ICD-10-CM

## 2023-08-18 RX ORDER — OMEPRAZOLE 40 MG/1
40 CAPSULE, DELAYED RELEASE ORAL DAILY
Qty: 30 CAPSULE | Refills: 6 | Status: SHIPPED | OUTPATIENT
Start: 2023-08-18

## 2023-08-18 NOTE — H&P (VIEW-ONLY)
Bariatric Consult:    Chief Complaint: Morbid Obesity  Referred by Jade Whitlock APRN    Brandi Ash is here today for consult on Morbid Obesity    History of Present Illness:     Brandi Ash is a 28 y.o. female with morbid obesity with co-morbidities including  has a past medical history of Anxiety, DDD (degenerative disc disease), lumbar, Hypertension, Kidney stone, Panic disorder, PCOS (polycystic ovarian syndrome), Pre-diabetes, and Vitamin D deficiency.  who presents for surgical consultation for the above procedure. Brandi has completed the initial intake visit and has been examined by our nurse practitioner, dietician, psychologist and underwent the extensive educational teaching process under the guidance of our bariatric coordinator and myself. Brandi also has seen the educational video JUSTINA on the surgical procedure if available. Brandi attended today more educational teaching from our bariatric coordinator and myself. Brandi has had an extensive medical workup including a visit with their primary care physician, EKG, chest radiograph, blood work, EGD or UGI and possibly further testing. These have been reviewed by me and discussed with the patient. Brandi is now ready to proceed with surgery. Brandi presently denies nausea, vomiting, fever, chills, chest pain, shortness of air, melena, hematochezia, hemetemesis, dysuria, frequency, hematuria, jaundice or abdominal pain.     Wt Readings from Last 10 Encounters:   08/18/23 (!) 165 kg (363 lb 6.4 oz)   08/09/23 (!) 163 kg (359 lb 3.2 oz)   07/13/23 (!) 162 kg (357 lb 9.6 oz)   06/13/23 (!) 159 kg (350 lb)   06/14/23 (!) 160 kg (352 lb 9.6 oz)   05/18/23 (!) 158 kg (348 lb)   04/13/23 (!) 155 kg (341 lb)   03/28/23 (!) 156 kg (345 lb)   03/28/23 (!) 157 kg (345 lb 6.4 oz)   09/22/22 (!) 149 kg (328 lb)       The  pre-op EGD shows   small hiatal hernia, and does not take a PPI, and has symptoms ( I started her on PPI)    Past Medical  History:   Diagnosis Date    Anxiety     DDD (degenerative disc disease), lumbar     Hypertension     Kidney stone     Panic disorder     PCOS (polycystic ovarian syndrome)     Pre-diabetes     Vitamin D deficiency        No diagnosis found.    Past Surgical History:   Procedure Laterality Date     SECTION      CYSTOSCOPY W/ LASER LITHOTRIPSY      ENDOMETRIAL BIOPSY      ENDOSCOPY N/A 2023    Procedure: ESOPHAGOGASTRODUODENOSCOPY with biopsy x1 area;  Surgeon: Cari Smith MD;  Location: [facility] ENDOSCOPY;  Service: General;  Laterality: N/A;  Post- small hiatal hernia    TONSILLECTOMY           Current Outpatient Medications:     busPIRone (BUSPAR) 10 MG tablet, Take 1 tablet by mouth 2 (Two) Times a Day. (Patient taking differently: Take 1 tablet by mouth Daily.), Disp: 60 tablet, Rfl: 1    escitalopram (LEXAPRO) 20 MG tablet, Take 1 tablet by mouth Daily. (Patient taking differently: Take 1 tablet by mouth Daily. Take preop), Disp: 30 tablet, Rfl: 1    metoprolol tartrate (LOPRESSOR) 50 MG tablet, Take 1 tablet by mouth 2 (Two) Times a Day. (Patient taking differently: Take 1 tablet by mouth Daily. Take preop), Disp: 180 tablet, Rfl: 0    vitamin D (ERGOCALCIFEROL) 1.25 MG (01192 UT) capsule capsule, Take 1 capsule by mouth Every 7 (Seven) Days. (Patient taking differently: Take 1 capsule by mouth Every 7 (Seven) Days. Takes on .), Disp: 12 capsule, Rfl: 1    acetaminophen (TYLENOL) 325 MG tablet, Take 2 tablets by mouth Every 6 (Six) Hours As Needed for Mild Pain. (Patient not taking: Reported on 2023), Disp: , Rfl:     EluRyng 0.12-0.015 MG/24HR vaginal ring, Currently using- about to switch to PO med, Disp: , Rfl:     Dagoberto Fe  1-20 MG-MCG per tablet, , Disp: , Rfl:     Social History     Socioeconomic History    Marital status:    Tobacco Use    Smoking status: Former     Packs/day: 0.50     Years: 10.00     Pack years: 5.00     Types: Cigarettes     Quit date:       Years since quittin.6     Passive exposure: Past    Smokeless tobacco: Never   Vaping Use    Vaping Use: Former    Quit date: 2023    Passive vaping exposure: Yes   Substance and Sexual Activity    Alcohol use: Yes     Comment: social    Drug use: No    Sexual activity: Yes     Partners: Male       Family History   Problem Relation Age of Onset    Hypertension Mother     Obesity Mother     Sleep apnea Mother     Obesity Maternal Grandmother     Hypertension Maternal Grandmother     Sleep apnea Maternal Grandmother        Review of Systems:  Review of Systems    Review of Systems   Constitutional: Negative.    HENT: Negative.    Eyes: Negative.    Respiratory: Negative.    Cardiovascular: Negative.    Gastrointestinal: Negative.    Endocrine: Negative.    Genitourinary: Negative.    Musculoskeletal: Negative.    Skin: Negative.    Allergic/Immunologic: Negative.    Neurological: Negative.    Hematological: Negative.    Psychiatric/Behavioral: Negative.      Physical Exam:  Body mass index is 55.25 kg/m².   Vital Signs:  Weight: (!) 165 kg (363 lb 6.4 oz)   Body mass index is 55.25 kg/m².      Heart Rate: 59   BP: 132/88     Awake and alert  Normal mental status  Normal pulmonary effort  Abdomen appropriate tenderness  Incisions no erythema  Extremities no tenderness or swelling      Assessment:    Brandi Ash is a 28 y.o. year old female with medically complicated severe obesity with a BMI of Body mass index is 55.25 kg/m². and multiple co-morbidities listed in the encounter diagnosis.    I think she is an appropriate candidate for this surgery, and is ready to proceed.      The patient has returned to the office for a surgical consultation and has requested to proceed with a laparoscopic gastric sleeve.  I have had the opportunity to obtain a history, examine the patient and review the patient's chart.    The patient understands that surgery is a tool and that weight loss is not guaranteed but only  seen in the context of appropriate use, regular follow up, exercise and making appropriate food choices.     I personally discussed the potential complications of the laparoscopic gastric sleeve with this patient.  The patient is well aware of potential complications of the surgery that include but not limited to bleeding, infections, deep vein thrombosis, pulmonary embolism, pulmonary complications such as pneumonia, cardiac event, hernias, small bowel obstruction, damage to the spleen or other organs, bowel injury, disfiguring scars, failure to lose weight, need for additional surgery, conversion to an open procedure and death.  The patient is also aware of complications which apply in particular to the gastric sleeve and can include but not limited to the leakage of gastric contents at the staple line, the development of an intra-abdominal abscess, gastroesophageal reflux disease, Maria's esophagus, ulcers, vitamin/mineral deficiencies, strictures, and the possibility of converting this procedure to a Cinthia-en-Y gastric bypass. The patient also understands the possibility of requiring an acid reducer medication for the rest of their life.    The risks, benefits, potential complications and alternative therapies were discussed at great length as outlined in our extensive consent forms, online consent and educational teaching processes.    The patient has confirmed the participation in the programs extensive educational activities.    All questions and concerns were answered to patient's satisfaction.  The patient now wishes to proceed with surgery.    Patient has [default value] the pre-operative insertion of an IVC filter.     The patient has [default value] a postoperative course of anitcoagulant therapy.      Plan/Discussion/Summary:        I instructed patient to start on a H2 blocker or proton pump inhibitor if not already on one of these medications.    I explained in detail the procedures that we perform.   All of these procedures have a chance to convert to open if any technical challenges or complications do occur.  Bariatric surgery is not cosmetic surgery but rather a tool to help a patient make a life-long commitment lifestyle change including diet, exercise, behavior changes, and taking supplemental vitamins and minerals.    Problems after surgery may require more operations to correct them.    The risks, benefits, alternatives, and potential complications of all of the procedures were explained in detail including, but not limited to death, anesthesia and medication adverse effect, deep venous thrombosis, pulmonary embolism, trocar site/incisional hernia, wound infection, abdominal infection, bleeding, failure to lose weight, gain weight, a change in body image, metabolic complications with vitamin deficiences and anemia.    Weight loss expectations were discussed with the patient in detail. The weight loss operations most commonly performed are the sleeve gastrectomy and the Cinthia-en-Y gastric bypass. These operations result in weight losses up to approximately 25-35% of initial body weight 12 to 24 months after surgery with the gastric bypass usually the higher percent of weight loss but depends on patient using the tool.    For the gastric bypass and loop duodenal switch (JUSTUS-S) the risks include but not limited to the following early complications:  Anastomotic leak/peritonitis, Cinthia/Alimentary/biliopancreatic limb obstruction, severe & minor wound infection/seroma, and nausea/vomiting.  Late complications can include but are not limited to malnutrition, vitamin deficiencies, frequent loose stools,  stomal stenosis, marginal ulcer, bowel obstruction, intussusception, internal, and incisional hernia.    Regarding the gastric sleeve, there is less long-term outcome data and higher risk of dysphagia and reflux compared to a gastric bypass, as well as risk of internal visceral/organ injury, splenectomy,  bleeding, infection, leak (which could require further intervention possible conversion to Cinthia-en-Y gastric bypass), stenosis and possibility of regaining weight.    Brandi was counseled regarding diagnostic results, instructions for management, risk factor reductions, prognosis, patient and family education, impressions, risks and benefits of treatment options and importance of compliance with treatment. Total face to face time of the encounter was over 45 minutes and over 30 minutes was spent counseling.     Sergio Report   As part of this patient's treatment plan I am prescribing controlled substances. The patient has been made aware of appropriate use of such medications, including potential risk of somnolence, limited ability to drive and /or work safely, and potential for dependence or overdose. It has also been made clear that these medications are for use by this patient only, without concomitant use of alcohol or other substances unless prescribed.    Brandi has completed prescribing agreement detailing terms of continued prescribing of controlled substances, including monitoring SERGIO reports, urine drug screening, and pill counts if necessary. Brandi is aware that inappropriate use will result in cessation of prescribing such medications.    SERGIO report has been reviewed      History and physical exam exhibit continued safe and appropriate use of controlled substances.      Brandi understands the surgical procedures and the different surgical options that are available.  She understands the lifestyle changes that are required after surgery and has agreed to follow the guidelines outlined in the weight management program.  She also expressed understanding of the risks involved and had all of female questions answered and desires to proceed.      Cari Smith MD  8/18/2023

## 2023-08-18 NOTE — PROGRESS NOTES
Bariatric Consult:    Chief Complaint: Morbid Obesity  Referred by Jade Whitlock APRN    Brandi Ash is here today for consult on Morbid Obesity    History of Present Illness:     Brandi Ash is a 28 y.o. female with morbid obesity with co-morbidities including  has a past medical history of Anxiety, DDD (degenerative disc disease), lumbar, Hypertension, Kidney stone, Panic disorder, PCOS (polycystic ovarian syndrome), Pre-diabetes, and Vitamin D deficiency.  who presents for surgical consultation for the above procedure. Brandi has completed the initial intake visit and has been examined by our nurse practitioner, dietician, psychologist and underwent the extensive educational teaching process under the guidance of our bariatric coordinator and myself. Brandi also has seen the educational video JUSTINA on the surgical procedure if available. Brandi attended today more educational teaching from our bariatric coordinator and myself. Brandi has had an extensive medical workup including a visit with their primary care physician, EKG, chest radiograph, blood work, EGD or UGI and possibly further testing. These have been reviewed by me and discussed with the patient. Brandi is now ready to proceed with surgery. Brandi presently denies nausea, vomiting, fever, chills, chest pain, shortness of air, melena, hematochezia, hemetemesis, dysuria, frequency, hematuria, jaundice or abdominal pain.     Wt Readings from Last 10 Encounters:   08/18/23 (!) 165 kg (363 lb 6.4 oz)   08/09/23 (!) 163 kg (359 lb 3.2 oz)   07/13/23 (!) 162 kg (357 lb 9.6 oz)   06/13/23 (!) 159 kg (350 lb)   06/14/23 (!) 160 kg (352 lb 9.6 oz)   05/18/23 (!) 158 kg (348 lb)   04/13/23 (!) 155 kg (341 lb)   03/28/23 (!) 156 kg (345 lb)   03/28/23 (!) 157 kg (345 lb 6.4 oz)   09/22/22 (!) 149 kg (328 lb)       The  pre-op EGD shows   small hiatal hernia, and does not take a PPI, and has symptoms ( I started her on PPI)    Past Medical  History:   Diagnosis Date    Anxiety     DDD (degenerative disc disease), lumbar     Hypertension     Kidney stone     Panic disorder     PCOS (polycystic ovarian syndrome)     Pre-diabetes     Vitamin D deficiency        No diagnosis found.    Past Surgical History:   Procedure Laterality Date     SECTION      CYSTOSCOPY W/ LASER LITHOTRIPSY      ENDOMETRIAL BIOPSY      ENDOSCOPY N/A 2023    Procedure: ESOPHAGOGASTRODUODENOSCOPY with biopsy x1 area;  Surgeon: Cari Smith MD;  Location: [facility];  Service: General;  Laterality: N/A;  Post- small hiatal hernia    TONSILLECTOMY           Current Outpatient Medications:     busPIRone (BUSPAR) 10 MG tablet, Take 1 tablet by mouth 2 (Two) Times a Day. (Patient taking differently: Take 1 tablet by mouth Daily.), Disp: 60 tablet, Rfl: 1    escitalopram (LEXAPRO) 20 MG tablet, Take 1 tablet by mouth Daily. (Patient taking differently: Take 1 tablet by mouth Daily. Take preop), Disp: 30 tablet, Rfl: 1    metoprolol tartrate (LOPRESSOR) 50 MG tablet, Take 1 tablet by mouth 2 (Two) Times a Day. (Patient taking differently: Take 1 tablet by mouth Daily. Take preop), Disp: 180 tablet, Rfl: 0    vitamin D (ERGOCALCIFEROL) 1.25 MG (78580 UT) capsule capsule, Take 1 capsule by mouth Every 7 (Seven) Days. (Patient taking differently: Take 1 capsule by mouth Every 7 (Seven) Days. Takes on .), Disp: 12 capsule, Rfl: 1    acetaminophen (TYLENOL) 325 MG tablet, Take 2 tablets by mouth Every 6 (Six) Hours As Needed for Mild Pain. (Patient not taking: Reported on 2023), Disp: , Rfl:     EluRyng 0.12-0.015 MG/24HR vaginal ring, Currently using- about to switch to PO med, Disp: , Rfl:     Dagoberto Fe  1-20 MG-MCG per tablet, , Disp: , Rfl:     Social History     Socioeconomic History    Marital status:    Tobacco Use    Smoking status: Former     Packs/day: 0.50     Years: 10.00     Pack years: 5.00     Types: Cigarettes     Quit date:       Years since quittin.6     Passive exposure: Past    Smokeless tobacco: Never   Vaping Use    Vaping Use: Former    Quit date: 2023    Passive vaping exposure: Yes   Substance and Sexual Activity    Alcohol use: Yes     Comment: social    Drug use: No    Sexual activity: Yes     Partners: Male       Family History   Problem Relation Age of Onset    Hypertension Mother     Obesity Mother     Sleep apnea Mother     Obesity Maternal Grandmother     Hypertension Maternal Grandmother     Sleep apnea Maternal Grandmother        Review of Systems:  Review of Systems    Review of Systems   Constitutional: Negative.    HENT: Negative.    Eyes: Negative.    Respiratory: Negative.    Cardiovascular: Negative.    Gastrointestinal: Negative.    Endocrine: Negative.    Genitourinary: Negative.    Musculoskeletal: Negative.    Skin: Negative.    Allergic/Immunologic: Negative.    Neurological: Negative.    Hematological: Negative.    Psychiatric/Behavioral: Negative.      Physical Exam:  Body mass index is 55.25 kg/mý.   Vital Signs:  Weight: (!) 165 kg (363 lb 6.4 oz)   Body mass index is 55.25 kg/mý.      Heart Rate: 59   BP: 132/88     Awake and alert  Normal mental status  Normal pulmonary effort  Abdomen appropriate tenderness  Incisions no erythema  Extremities no tenderness or swelling      Assessment:    Brandi Ash is a 28 y.o. year old female with medically complicated severe obesity with a BMI of Body mass index is 55.25 kg/mý. and multiple co-morbidities listed in the encounter diagnosis.    I think she is an appropriate candidate for this surgery, and is ready to proceed.      The patient has returned to the office for a surgical consultation and has requested to proceed with a laparoscopic gastric sleeve.  I have had the opportunity to obtain a history, examine the patient and review the patient's chart.    The patient understands that surgery is a tool and that weight loss is not guaranteed but only  seen in the context of appropriate use, regular follow up, exercise and making appropriate food choices.     I personally discussed the potential complications of the laparoscopic gastric sleeve with this patient.  The patient is well aware of potential complications of the surgery that include but not limited to bleeding, infections, deep vein thrombosis, pulmonary embolism, pulmonary complications such as pneumonia, cardiac event, hernias, small bowel obstruction, damage to the spleen or other organs, bowel injury, disfiguring scars, failure to lose weight, need for additional surgery, conversion to an open procedure and death.  The patient is also aware of complications which apply in particular to the gastric sleeve and can include but not limited to the leakage of gastric contents at the staple line, the development of an intra-abdominal abscess, gastroesophageal reflux disease, Maria's esophagus, ulcers, vitamin/mineral deficiencies, strictures, and the possibility of converting this procedure to a Cinthia-en-Y gastric bypass. The patient also understands the possibility of requiring an acid reducer medication for the rest of their life.    The risks, benefits, potential complications and alternative therapies were discussed at great length as outlined in our extensive consent forms, online consent and educational teaching processes.    The patient has confirmed the participation in the programs extensive educational activities.    All questions and concerns were answered to patient's satisfaction.  The patient now wishes to proceed with surgery.    Patient has [default value] the pre-operative insertion of an IVC filter.     The patient has [default value] a postoperative course of anitcoagulant therapy.      Plan/Discussion/Summary:        I instructed patient to start on a H2 blocker or proton pump inhibitor if not already on one of these medications.    I explained in detail the procedures that we perform.   All of these procedures have a chance to convert to open if any technical challenges or complications do occur.  Bariatric surgery is not cosmetic surgery but rather a tool to help a patient make a life-long commitment lifestyle change including diet, exercise, behavior changes, and taking supplemental vitamins and minerals.    Problems after surgery may require more operations to correct them.    The risks, benefits, alternatives, and potential complications of all of the procedures were explained in detail including, but not limited to death, anesthesia and medication adverse effect, deep venous thrombosis, pulmonary embolism, trocar site/incisional hernia, wound infection, abdominal infection, bleeding, failure to lose weight, gain weight, a change in body image, metabolic complications with vitamin deficiences and anemia.    Weight loss expectations were discussed with the patient in detail. The weight loss operations most commonly performed are the sleeve gastrectomy and the Cinthia-en-Y gastric bypass. These operations result in weight losses up to approximately 25-35% of initial body weight 12 to 24 months after surgery with the gastric bypass usually the higher percent of weight loss but depends on patient using the tool.    For the gastric bypass and loop duodenal switch (JUSTUS-S) the risks include but not limited to the following early complications:  Anastomotic leak/peritonitis, Cnithia/Alimentary/biliopancreatic limb obstruction, severe & minor wound infection/seroma, and nausea/vomiting.  Late complications can include but are not limited to malnutrition, vitamin deficiencies, frequent loose stools,  stomal stenosis, marginal ulcer, bowel obstruction, intussusception, internal, and incisional hernia.    Regarding the gastric sleeve, there is less long-term outcome data and higher risk of dysphagia and reflux compared to a gastric bypass, as well as risk of internal visceral/organ injury, splenectomy,  bleeding, infection, leak (which could require further intervention possible conversion to Cinthia-en-Y gastric bypass), stenosis and possibility of regaining weight.    Brandi was counseled regarding diagnostic results, instructions for management, risk factor reductions, prognosis, patient and family education, impressions, risks and benefits of treatment options and importance of compliance with treatment. Total face to face time of the encounter was over 45 minutes and over 30 minutes was spent counseling.     Sergio Report   As part of this patient's treatment plan I am prescribing controlled substances. The patient has been made aware of appropriate use of such medications, including potential risk of somnolence, limited ability to drive and /or work safely, and potential for dependence or overdose. It has also been made clear that these medications are for use by this patient only, without concomitant use of alcohol or other substances unless prescribed.    Brandi has completed prescribing agreement detailing terms of continued prescribing of controlled substances, including monitoring SERGIO reports, urine drug screening, and pill counts if necessary. Brandi is aware that inappropriate use will result in cessation of prescribing such medications.    SERGIO report has been reviewed      History and physical exam exhibit continued safe and appropriate use of controlled substances.      Brandi understands the surgical procedures and the different surgical options that are available.  She understands the lifestyle changes that are required after surgery and has agreed to follow the guidelines outlined in the weight management program.  She also expressed understanding of the risks involved and had all of female questions answered and desires to proceed.      Cari Smith MD  8/18/2023

## 2023-08-28 ENCOUNTER — LAB (OUTPATIENT)
Dept: LAB | Facility: HOSPITAL | Age: 28
End: 2023-08-28
Payer: MEDICAID

## 2023-08-28 ENCOUNTER — HOSPITAL ENCOUNTER (OUTPATIENT)
Dept: CARDIOLOGY | Facility: HOSPITAL | Age: 28
Discharge: HOME | End: 2023-08-28
Payer: MEDICAID

## 2023-08-28 DIAGNOSIS — E66.01 CLASS 3 OBESITY: ICD-10-CM

## 2023-08-28 LAB
ABO GROUP BLD: NORMAL
ANION GAP SERPL CALCULATED.3IONS-SCNC: 12.5 MMOL/L (ref 5–15)
BASOPHILS # BLD AUTO: 0.07 10*3/MM3 (ref 0–0.2)
BASOPHILS NFR BLD AUTO: 0.6 % (ref 0–1.5)
BLD GP AB SCN SERPL QL: NEGATIVE
BUN SERPL-MCNC: 14 MG/DL (ref 6–20)
BUN/CREAT SERPL: 20.6 (ref 7–25)
CALCIUM SPEC-SCNC: 9.4 MG/DL (ref 8.6–10.5)
CHLORIDE SERPL-SCNC: 96 MMOL/L (ref 98–107)
CO2 SERPL-SCNC: 25.5 MMOL/L (ref 22–29)
CREAT SERPL-MCNC: 0.68 MG/DL (ref 0.57–1)
DEPRECATED RDW RBC AUTO: 45 FL (ref 37–54)
EGFRCR SERPLBLD CKD-EPI 2021: 121.1 ML/MIN/1.73
EOSINOPHIL # BLD AUTO: 0.1 10*3/MM3 (ref 0–0.4)
EOSINOPHIL NFR BLD AUTO: 0.8 % (ref 0.3–6.2)
ERYTHROCYTE [DISTWIDTH] IN BLOOD BY AUTOMATED COUNT: 15.2 % (ref 12.3–15.4)
GLUCOSE SERPL-MCNC: 94 MG/DL (ref 65–99)
HCT VFR BLD AUTO: 40.7 % (ref 34–46.6)
HGB BLD-MCNC: 13.2 G/DL (ref 12–15.9)
IMM GRANULOCYTES # BLD AUTO: 0.04 10*3/MM3 (ref 0–0.05)
IMM GRANULOCYTES NFR BLD AUTO: 0.3 % (ref 0–0.5)
LYMPHOCYTES # BLD AUTO: 5.46 10*3/MM3 (ref 0.7–3.1)
LYMPHOCYTES NFR BLD AUTO: 45.2 % (ref 19.6–45.3)
MCH RBC QN AUTO: 26.3 PG (ref 26.6–33)
MCHC RBC AUTO-ENTMCNC: 32.4 G/DL (ref 31.5–35.7)
MCV RBC AUTO: 81.1 FL (ref 79–97)
MONOCYTES # BLD AUTO: 0.65 10*3/MM3 (ref 0.1–0.9)
MONOCYTES NFR BLD AUTO: 5.4 % (ref 5–12)
NEUTROPHILS NFR BLD AUTO: 47.7 % (ref 42.7–76)
NEUTROPHILS NFR BLD AUTO: 5.75 10*3/MM3 (ref 1.7–7)
NRBC BLD AUTO-RTO: 0 /100 WBC (ref 0–0.2)
PLATELET # BLD AUTO: 379 10*3/MM3 (ref 140–450)
PMV BLD AUTO: 10.9 FL (ref 6–12)
POTASSIUM SERPL-SCNC: 4.1 MMOL/L (ref 3.5–5.2)
QT INTERVAL: 385 MS
QTC INTERVAL: 455 MS
RBC # BLD AUTO: 5.02 10*6/MM3 (ref 3.77–5.28)
RH BLD: POSITIVE
SODIUM SERPL-SCNC: 134 MMOL/L (ref 136–145)
T&S EXPIRATION DATE: NORMAL
WBC NRBC COR # BLD: 12.07 10*3/MM3 (ref 3.4–10.8)

## 2023-08-28 PROCEDURE — 93005 ELECTROCARDIOGRAM TRACING: CPT

## 2023-08-28 PROCEDURE — 80048 BASIC METABOLIC PNL TOTAL CA: CPT | Performed by: SURGERY

## 2023-08-28 PROCEDURE — 86901 BLOOD TYPING SEROLOGIC RH(D): CPT

## 2023-08-28 PROCEDURE — 86900 BLOOD TYPING SEROLOGIC ABO: CPT

## 2023-08-28 PROCEDURE — 36415 COLL VENOUS BLD VENIPUNCTURE: CPT | Performed by: SURGERY

## 2023-08-28 PROCEDURE — 93010 ELECTROCARDIOGRAM REPORT: CPT | Performed by: INTERNAL MEDICINE

## 2023-08-28 PROCEDURE — 85025 COMPLETE CBC W/AUTO DIFF WBC: CPT | Performed by: SURGERY

## 2023-08-28 PROCEDURE — 86850 RBC ANTIBODY SCREEN: CPT

## 2023-08-30 ENCOUNTER — TELEPHONE (OUTPATIENT)
Dept: BARIATRICS/WEIGHT MGMT | Facility: CLINIC | Age: 28
End: 2023-08-30
Payer: MEDICAID

## 2023-08-30 NOTE — TELEPHONE ENCOUNTER
Pt has questions on lab and ECG results. Explained she would need to contact ordering provider to discuss ECG results but that I would alert Michelle to the recent procedure. [facility]

## 2023-08-31 DIAGNOSIS — Z01.818 PRE-OPERATIVE CLEARANCE: Primary | ICD-10-CM

## 2023-08-31 DIAGNOSIS — E66.01 CLASS 3 OBESITY: ICD-10-CM

## 2023-08-31 DIAGNOSIS — R94.31 ABNORMAL EKG: ICD-10-CM

## 2023-08-31 NOTE — TELEPHONE ENCOUNTER
Called [facility] for appt. Unable to schedule tomorrow. Teresa was informed and is attempting to secure appt. [facility]

## 2023-08-31 NOTE — TELEPHONE ENCOUNTER
Appt secured with Dr. Cota. Pt advised and spoke to Michelle to clarify details of tomorrow's appt. [facility]

## 2023-08-31 NOTE — PROGRESS NOTES
Encounter Date:09/01/2023        Patient ID: Brandi Ash is a 28 y.o. female.      Chief Complaint:      History of Present Illness  28-year-old pleasant woman with hypertension, anxiety disorder, degenerative disc disease, polycystic ovarian syndrome and morbid obesity comes to the cardiology office to establish care and seeks preoperative cardiovascular risk assessment prior to undergoing bariatric surgery.  Today she comes in accompanied by her daughter.  She denies any chest pain or shortness of breath.  She is able to ambulate without difficulty.  She has excellent physical capacity and has no trouble walking 3-4 blocks or 2 flights of stairs.  There is no family history of premature coronary disease.    The following portions of the patient's history were reviewed and updated as appropriate: allergies, current medications, past family history, past medical history, past social history, past surgical history, and problem list.    Review of Systems   Constitutional: Negative for malaise/fatigue.   Cardiovascular:  Negative for chest pain, dyspnea on exertion, leg swelling and palpitations.   Respiratory:  Negative for cough and shortness of breath.    Gastrointestinal:  Negative for abdominal pain, nausea and vomiting.   Neurological:  Negative for dizziness, focal weakness, headaches, light-headedness and numbness.   All other systems reviewed and are negative.      Current Outpatient Medications:     acetaminophen (TYLENOL) 325 MG tablet, Take 2 tablets by mouth Every 6 (Six) Hours As Needed for Mild Pain., Disp: , Rfl:     busPIRone (BUSPAR) 10 MG tablet, Take 1 tablet by mouth 2 (Two) Times a Day. (Patient taking differently: Take 1 tablet by mouth Daily. Takes once daily), Disp: 60 tablet, Rfl: 1    escitalopram (LEXAPRO) 20 MG tablet, Take 1 tablet by mouth Daily. (Patient taking differently: Take 1 tablet by mouth Daily. Take preop), Disp: 30 tablet, Rfl: 1    LORazepam (ATIVAN) 0.5 MG tablet, Take  1 tablet by mouth 2 (Two) Times a Day As Needed for Anxiety., Disp: , Rfl:     metoprolol tartrate (LOPRESSOR) 50 MG tablet, Take 1 tablet by mouth 2 (Two) Times a Day. (Patient taking differently: Take 1 tablet by mouth Daily. Take preop), Disp: 180 tablet, Rfl: 0    omeprazole (priLOSEC) 40 MG capsule, Take 1 capsule by mouth Daily., Disp: 30 capsule, Rfl: 6    vitamin D (ERGOCALCIFEROL) 1.25 MG (86912 UT) capsule capsule, Take 1 capsule by mouth Every 7 (Seven) Days. (Patient taking differently: Take 1 capsule by mouth Every 7 (Seven) Days. Takes on .), Disp: 12 capsule, Rfl: 1    Current outpatient and discharge medications have been reconciled for the patient.  Reviewed by: Don Cota MD       No Known Allergies    Family History   Problem Relation Age of Onset    Hypertension Mother     Obesity Mother     Sleep apnea Mother     Obesity Maternal Grandmother     Hypertension Maternal Grandmother     Sleep apnea Maternal Grandmother        Past Surgical History:   Procedure Laterality Date     SECTION      CYSTOSCOPY W/ LASER LITHOTRIPSY      ENDOMETRIAL BIOPSY      ENDOSCOPY N/A 2023    Procedure: ESOPHAGOGASTRODUODENOSCOPY with biopsy x1 area;  Surgeon: Cari Smith MD;  Location: [facility] END[facility]COPY;  Service: General;  Laterality: N/A;  Post- small hiatal hernia    TONSILLECTOMY         Past Medical History:   Diagnosis Date    Anxiety     DDD (degenerative disc disease), lumbar     Hypertension     Kidney stone     Panic disorder     PCOS (polycystic ovarian syndrome)     Pre-diabetes     Vitamin D deficiency        Family History   Problem Relation Age of Onset    Hypertension Mother     Obesity Mother     Sleep apnea Mother     Obesity Maternal Grandmother     Hypertension Maternal Grandmother     Sleep apnea Maternal Grandmother        Social History     Socioeconomic History    Marital status:    Tobacco Use    Smoking status: Former     Packs/day: 0.50     Years: 10.00  "    Pack years: 5.00     Types: Cigarettes     Quit date:      Years since quittin.6     Passive exposure: Past    Smokeless tobacco: Never   Vaping Use    Vaping Use: Every day    Last attempt to quit: 2023    Passive vaping exposure: Yes   Substance and Sexual Activity    Alcohol use: Yes     Comment: social    Drug use: No    Sexual activity: Yes     Partners: Male               Objective:       Physical Exam    /89 (BP Location: Left arm, Patient Position: Sitting)   Pulse 83   Ht 172.7 cm (68\")   Wt (!) 158 kg (349 lb)   SpO2 98%   BMI 53.07 kg/mý   The patient is alert, oriented and in no distress.    Vital signs as noted above.    Head and neck revealed no carotid bruits or jugular venous distension.  No thyromegaly or lymphadenopathy is present.    Lungs clear.  No wheezing.  Breath sounds are normal bilaterally.    Heart normal first and second heart sounds.  Midsystolic click/murmur..  No pericardial rub is present.  No gallop is present.    Abdomen soft and nontender.  No organomegaly is present.    Extremities revealed good peripheral pulses without any pedal edema.    Skin warm and dry.    Musculoskeletal system is grossly normal.    CNS grossly normal.           Diagnosis Plan   1. Pre-operative cardiovascular examination        2. Primary hypertension        3. Mixed hyperlipidemia        4. Pre-diabetes        5. Class 3 severe obesity due to excess calories without serious comorbidity with body mass index (BMI) of 50.0 to 59.9 in adult        6. Anxiety and depression        7. Gastroesophageal reflux disease without esophagitis        LAB RESULTS (LAST 7 DAYS)    CBC  Results from last 7 days   Lab Units 23  0742   WBC 10*3/mm3 12.07*   RBC 10*6/mm3 5.02   HEMOGLOBIN g/dL 13.2   HEMATOCRIT % 40.7   MCV fL 81.1   PLATELETS 10*3/mm3 379       BMP  Results from last 7 days   Lab Units 23  0742   SODIUM mmol/L 134*   POTASSIUM mmol/L 4.1   CHLORIDE mmol/L 96*   CO2 " mmol/L 25.5   BUN mg/dL 14   CREATININE mg/dL 0.68   GLUCOSE mg/dL 94       CMP   Results from last 7 days   Lab Units 08/28/23  0742   SODIUM mmol/L 134*   POTASSIUM mmol/L 4.1   CHLORIDE mmol/L 96*   CO2 mmol/L 25.5   BUN mg/dL 14   CREATININE mg/dL 0.68   GLUCOSE mg/dL 94         BNP        TROPONIN        CoAg        Creatinine Clearance  Estimated Creatinine Clearance: 196.6 mL/min (by C-G formula based on SCr of 0.68 mg/dL).    ABG        Radiology  No radiology results for the last day    EKG  Procedures    Stress test      Echocardiogram      Cardiac catheterization  No results found for this or any previous visit.          Assessment and Plan       Diagnoses and all orders for this visit:    1. Pre-operative cardiovascular examination (Primary)    Gonzáles Perioperative Risk for Myocardial Infarction or Cardiac Arrest (KELLY):  0% Risk of myocardial infarction or cardiac arrest, intraoperatively or up to 30 days post-op    Revised Cardiac Risk Index for Pre-Operative Risk: 0 point  3.9% 30-day risk of death, MI, or cardiac arrest    A preop ECG showed sinus rhythm, possible left atrial enlargement.  It was read as borderline Q waves in inferior leads however these are physiological in a young patient.    It also read borderline T wave abnormalities which is frequently seen in large breasted patients.    She may proceed with gastric sleeve/bariatric surgery with no further cardiac work-up.    2. Primary hypertension/murmur  Currently on metoprolol.  I will obtain an echocardiogram to look for LVH and left atrial enlargement as she has a faint flow murmur.  Results of echocardiogram will not interfere with preop risk assessment.    3. Mixed hyperlipidemia  Lipid panel shows , HDL 51, triglyceride 197 and total cholesterol 200.  Goal LDL is less than 100  Lifestyle changes and diet modification recommended    4. Pre-diabetes  A1c 6.2.  Lifestyle modification recommended    5. Class 3 severe obesity due to  excess calories without serious comorbidity with body mass index (BMI) of 50.0 to 59.9 in adult  Diet, exercise, weight loss, lifestyle modifications recommended.  Screening and treatment for sleep apnea recommended.  BMI is 53 and weight is 349 pounds    6. Anxiety and depression  Currently on buspirone and escitalopram.    7. Gastroesophageal reflux disease without esophagitis  She takes omeprazole.

## 2023-09-01 ENCOUNTER — PATIENT ROUNDING (BHMG ONLY) (OUTPATIENT)
Dept: CARDIOLOGY | Facility: CLINIC | Age: 28
End: 2023-09-01

## 2023-09-01 ENCOUNTER — TELEPHONE (OUTPATIENT)
Dept: CARDIOLOGY | Facility: CLINIC | Age: 28
End: 2023-09-01

## 2023-09-01 ENCOUNTER — OFFICE VISIT (OUTPATIENT)
Dept: CARDIOLOGY | Facility: CLINIC | Age: 28
End: 2023-09-01
Payer: MEDICAID

## 2023-09-01 VITALS
SYSTOLIC BLOOD PRESSURE: 132 MMHG | BODY MASS INDEX: 44.41 KG/M2 | WEIGHT: 293 LBS | HEART RATE: 83 BPM | OXYGEN SATURATION: 98 % | HEIGHT: 68 IN | DIASTOLIC BLOOD PRESSURE: 89 MMHG

## 2023-09-01 DIAGNOSIS — E78.2 MIXED HYPERLIPIDEMIA: ICD-10-CM

## 2023-09-01 DIAGNOSIS — Z01.810 PRE-OPERATIVE CARDIOVASCULAR EXAMINATION: Primary | ICD-10-CM

## 2023-09-01 DIAGNOSIS — I10 PRIMARY HYPERTENSION: ICD-10-CM

## 2023-09-01 DIAGNOSIS — F41.9 ANXIETY AND DEPRESSION: ICD-10-CM

## 2023-09-01 DIAGNOSIS — K21.9 GASTROESOPHAGEAL REFLUX DISEASE WITHOUT ESOPHAGITIS: ICD-10-CM

## 2023-09-01 DIAGNOSIS — F32.A ANXIETY AND DEPRESSION: ICD-10-CM

## 2023-09-01 DIAGNOSIS — R73.03 PRE-DIABETES: ICD-10-CM

## 2023-09-01 DIAGNOSIS — E66.01 CLASS 3 SEVERE OBESITY DUE TO EXCESS CALORIES WITHOUT SERIOUS COMORBIDITY WITH BODY MASS INDEX (BMI) OF 50.0 TO 59.9 IN ADULT: ICD-10-CM

## 2023-09-01 RX ORDER — LORAZEPAM 0.5 MG/1
0.5 TABLET ORAL 2 TIMES DAILY PRN
COMMUNITY

## 2023-09-01 NOTE — PROGRESS NOTES
A My-Chart message has been sent to the patient for [place] [facility]

## 2023-09-01 NOTE — TELEPHONE ENCOUNTER
Clearance letter for bariatric surgery on 9/5/23 signed by Dr. Cota and faxed to Dr. Smith's office at [phone].

## 2023-09-05 ENCOUNTER — ANESTHESIA (OUTPATIENT)
Dept: PERIOP | Facility: HOSPITAL | Age: 28
End: 2023-09-05
Payer: MEDICAID

## 2023-09-05 ENCOUNTER — ANESTHESIA EVENT (OUTPATIENT)
Dept: PERIOP | Facility: HOSPITAL | Age: 28
End: 2023-09-05
Payer: MEDICAID

## 2023-09-05 ENCOUNTER — HOSPITAL ENCOUNTER (INPATIENT)
Facility: HOSPITAL | Age: 28
LOS: 1 days | Discharge: HOME | End: 2023-09-06
Attending: SURGERY | Admitting: SURGERY
Payer: MEDICAID

## 2023-09-05 DIAGNOSIS — G89.18 POST-OP PAIN: Primary | ICD-10-CM

## 2023-09-05 DIAGNOSIS — E66.01 CLASS 3 OBESITY: ICD-10-CM

## 2023-09-05 LAB
ALBUMIN SERPL-MCNC: 3.8 G/DL (ref 3.5–5.2)
ALBUMIN/GLOB SERPL: 1.3 G/DL
ALP SERPL-CCNC: 64 U/L (ref 39–117)
ALT SERPL W P-5'-P-CCNC: 138 U/L (ref 1–33)
ANION GAP SERPL CALCULATED.3IONS-SCNC: 9 MMOL/L (ref 5–15)
AST SERPL-CCNC: 136 U/L (ref 1–32)
B-HCG UR QL: NEGATIVE
BASOPHILS # BLD AUTO: 0 10*3/MM3 (ref 0–0.2)
BASOPHILS NFR BLD AUTO: 0 % (ref 0–1.5)
BILIRUB SERPL-MCNC: 0.3 MG/DL (ref 0–1.2)
BUN SERPL-MCNC: 9 MG/DL (ref 6–20)
BUN/CREAT SERPL: 13.8 (ref 7–25)
CALCIUM SPEC-SCNC: 9.4 MG/DL (ref 8.6–10.5)
CHLORIDE SERPL-SCNC: 101 MMOL/L (ref 98–107)
CO2 SERPL-SCNC: 24 MMOL/L (ref 22–29)
CREAT SERPL-MCNC: 0.65 MG/DL (ref 0.57–1)
DEPRECATED RDW RBC AUTO: 47.3 FL (ref 37–54)
EGFRCR SERPLBLD CKD-EPI 2021: 122.4 ML/MIN/1.73
EOSINOPHIL # BLD AUTO: 0 10*3/MM3 (ref 0–0.4)
EOSINOPHIL NFR BLD AUTO: 0 % (ref 0.3–6.2)
ERYTHROCYTE [DISTWIDTH] IN BLOOD BY AUTOMATED COUNT: 16.1 % (ref 12.3–15.4)
GLOBULIN UR ELPH-MCNC: 3 GM/DL
GLUCOSE SERPL-MCNC: 183 MG/DL (ref 65–99)
HCT VFR BLD AUTO: 34.7 % (ref 34–46.6)
HGB BLD-MCNC: 11.2 G/DL (ref 12–15.9)
LYMPHOCYTES # BLD AUTO: 1 10*3/MM3 (ref 0.7–3.1)
LYMPHOCYTES NFR BLD AUTO: 7.5 % (ref 19.6–45.3)
MAGNESIUM SERPL-MCNC: 2.1 MG/DL (ref 1.6–2.6)
MCH RBC QN AUTO: 25.4 PG (ref 26.6–33)
MCHC RBC AUTO-ENTMCNC: 32.2 G/DL (ref 31.5–35.7)
MCV RBC AUTO: 78.8 FL (ref 79–97)
MONOCYTES # BLD AUTO: 0.3 10*3/MM3 (ref 0.1–0.9)
MONOCYTES NFR BLD AUTO: 2.3 % (ref 5–12)
NEUTROPHILS NFR BLD AUTO: 11.5 10*3/MM3 (ref 1.7–7)
NEUTROPHILS NFR BLD AUTO: 90.2 % (ref 42.7–76)
NRBC BLD AUTO-RTO: 0 /100 WBC (ref 0–0.2)
PHOSPHATE SERPL-MCNC: 1.8 MG/DL (ref 2.5–4.5)
PLATELET # BLD AUTO: 304 10*3/MM3 (ref 140–450)
PMV BLD AUTO: 9 FL (ref 6–12)
POTASSIUM SERPL-SCNC: 4.3 MMOL/L (ref 3.5–5.2)
PROT SERPL-MCNC: 6.8 G/DL (ref 6–8.5)
RBC # BLD AUTO: 4.4 10*6/MM3 (ref 3.77–5.28)
SODIUM SERPL-SCNC: 134 MMOL/L (ref 136–145)
WBC NRBC COR # BLD: 12.8 10*3/MM3 (ref 3.4–10.8)

## 2023-09-05 PROCEDURE — 80053 COMPREHEN METABOLIC PANEL: CPT | Performed by: SURGERY

## 2023-09-05 PROCEDURE — 25010000002 DEXAMETHASONE PER 1 MG: Performed by: NURSE ANESTHETIST, CERTIFIED REGISTERED

## 2023-09-05 PROCEDURE — 25010000002 CEFAZOLIN 3 G RECONSTITUTED SOLUTION 1 EACH VIAL: Performed by: SURGERY

## 2023-09-05 PROCEDURE — 25010000002 SUGAMMADEX 200 MG/2ML SOLUTION: Performed by: NURSE ANESTHETIST, CERTIFIED REGISTERED

## 2023-09-05 PROCEDURE — 25010000002 HYDRALAZINE PER 20 MG: Performed by: SURGERY

## 2023-09-05 PROCEDURE — 84100 ASSAY OF PHOSPHORUS: CPT | Performed by: SURGERY

## 2023-09-05 PROCEDURE — 25010000002 HYDROMORPHONE 1 MG/ML SOLUTION: Performed by: NURSE ANESTHETIST, CERTIFIED REGISTERED

## 2023-09-05 PROCEDURE — 25010000002 KETOROLAC TROMETHAMINE PER 15 MG: Performed by: SURGERY

## 2023-09-05 PROCEDURE — 88307 TISSUE EXAM BY PATHOLOGIST: CPT | Performed by: SURGERY

## 2023-09-05 PROCEDURE — 25010000002 ONDANSETRON PER 1 MG: Performed by: SURGERY

## 2023-09-05 PROCEDURE — 25010000002 ACETAMINOPHEN 10 MG/ML SOLUTION: Performed by: SURGERY

## 2023-09-05 PROCEDURE — 43775 LAP SLEEVE GASTRECTOMY: CPT | Performed by: NURSE PRACTITIONER

## 2023-09-05 PROCEDURE — 83735 ASSAY OF MAGNESIUM: CPT | Performed by: SURGERY

## 2023-09-05 PROCEDURE — 85025 COMPLETE CBC W/AUTO DIFF WBC: CPT | Performed by: SURGERY

## 2023-09-05 PROCEDURE — 81025 URINE PREGNANCY TEST: CPT | Performed by: SURGERY

## 2023-09-05 PROCEDURE — 25010000002 MAGNESIUM SULFATE PER 500 MG OF MAGNESIUM: Performed by: NURSE ANESTHETIST, CERTIFIED REGISTERED

## 2023-09-05 PROCEDURE — S2900 ROBOTIC SURGICAL SYSTEM: HCPCS | Performed by: SURGERY

## 2023-09-05 PROCEDURE — 25010000002 ONDANSETRON PER 1 MG: Performed by: NURSE ANESTHETIST, CERTIFIED REGISTERED

## 2023-09-05 PROCEDURE — 0DB64Z3 EXCISION OF STOMACH, PERCUTANEOUS ENDOSCOPIC APPROACH, VERTICAL: ICD-10-PCS | Performed by: SURGERY

## 2023-09-05 PROCEDURE — 25010000002 FENTANYL CITRATE (PF) 100 MCG/2ML SOLUTION: Performed by: NURSE ANESTHETIST, CERTIFIED REGISTERED

## 2023-09-05 PROCEDURE — 25010000002 PROPOFOL 10 MG/ML EMULSION: Performed by: NURSE ANESTHETIST, CERTIFIED REGISTERED

## 2023-09-05 PROCEDURE — 25010000002 ROPIVACAINE PER 1 MG: Performed by: SURGERY

## 2023-09-05 PROCEDURE — C9399 UNCLASSIFIED DRUGS OR BIOLOG: HCPCS | Performed by: SURGERY

## 2023-09-05 PROCEDURE — 25010000002 AMISULPRIDE (ANTIEMETIC) 5 MG/2ML SOLUTION: Performed by: SURGERY

## 2023-09-05 PROCEDURE — 25010000002 DIPHENHYDRAMINE PER 50 MG: Performed by: NURSE ANESTHETIST, CERTIFIED REGISTERED

## 2023-09-05 PROCEDURE — 25810000003 LACTATED RINGERS PER 1000 ML: Performed by: SURGERY

## 2023-09-05 PROCEDURE — 25010000002 MIDAZOLAM PER 1 MG: Performed by: NURSE ANESTHETIST, CERTIFIED REGISTERED

## 2023-09-05 PROCEDURE — 25810000003 LACTATED RINGERS SOLUTION: Performed by: SURGERY

## 2023-09-05 PROCEDURE — 25010000002 EPINEPHRINE 1 MG/ML SOLUTION: Performed by: SURGERY

## 2023-09-05 PROCEDURE — 0BQT4ZZ REPAIR DIAPHRAGM, PERCUTANEOUS ENDOSCOPIC APPROACH: ICD-10-PCS | Performed by: SURGERY

## 2023-09-05 PROCEDURE — 25010000002 LORAZEPAM PER 2 MG: Performed by: ANESTHESIOLOGY

## 2023-09-05 PROCEDURE — 8E0W4CZ ROBOTIC ASSISTED PROCEDURE OF TRUNK REGION, PERCUTANEOUS ENDOSCOPIC APPROACH: ICD-10-PCS | Performed by: SURGERY

## 2023-09-05 PROCEDURE — 43775 LAP SLEEVE GASTRECTOMY: CPT | Performed by: SURGERY

## 2023-09-05 DEVICE — STAPLER 60 RELOAD BLUE
Type: IMPLANTABLE DEVICE | Site: ABDOMEN | Status: FUNCTIONAL
Brand: SUREFORM

## 2023-09-05 DEVICE — PBT NON ABSORBABLE WOUND CLOSURE DEVICE
Type: IMPLANTABLE DEVICE | Site: ABDOMEN | Status: FUNCTIONAL
Brand: V-LOC

## 2023-09-05 DEVICE — HEMOST ABS SURGICEL SNOW 1X2IN: Type: IMPLANTABLE DEVICE | Site: ABDOMEN | Status: FUNCTIONAL

## 2023-09-05 DEVICE — ABSORBABLE WOUND CLOSURE DEVICE
Type: IMPLANTABLE DEVICE | Site: ABDOMEN | Status: FUNCTIONAL
Brand: SYNETURE

## 2023-09-05 DEVICE — STAPLER 60 RELOAD WHITE
Type: IMPLANTABLE DEVICE | Site: ABDOMEN | Status: FUNCTIONAL
Brand: SUREFORM

## 2023-09-05 RX ORDER — MIDAZOLAM HYDROCHLORIDE 1 MG/ML
INJECTION INTRAMUSCULAR; INTRAVENOUS AS NEEDED
Status: DISCONTINUED | OUTPATIENT
Start: 2023-09-05 | End: 2023-09-05 | Stop reason: SURG

## 2023-09-05 RX ORDER — OXYCODONE HYDROCHLORIDE 5 MG/1
10 TABLET ORAL EVERY 4 HOURS PRN
Status: DISCONTINUED | OUTPATIENT
Start: 2023-09-05 | End: 2023-09-05 | Stop reason: HOSPADM

## 2023-09-05 RX ORDER — FENTANYL CITRATE 50 UG/ML
INJECTION, SOLUTION INTRAMUSCULAR; INTRAVENOUS AS NEEDED
Status: DISCONTINUED | OUTPATIENT
Start: 2023-09-05 | End: 2023-09-05 | Stop reason: SURG

## 2023-09-05 RX ORDER — IPRATROPIUM BROMIDE AND ALBUTEROL SULFATE 2.5; .5 MG/3ML; MG/3ML
3 SOLUTION RESPIRATORY (INHALATION) ONCE AS NEEDED
Status: DISCONTINUED | OUTPATIENT
Start: 2023-09-05 | End: 2023-09-05 | Stop reason: HOSPADM

## 2023-09-05 RX ORDER — BUSPIRONE HYDROCHLORIDE 5 MG/1
10 TABLET ORAL DAILY
Status: DISCONTINUED | OUTPATIENT
Start: 2023-09-05 | End: 2023-09-06 | Stop reason: HOSPADM

## 2023-09-05 RX ORDER — DIPHENHYDRAMINE HYDROCHLORIDE 50 MG/ML
12.5 INJECTION INTRAMUSCULAR; INTRAVENOUS ONCE AS NEEDED
Status: COMPLETED | OUTPATIENT
Start: 2023-09-05 | End: 2023-09-05

## 2023-09-05 RX ORDER — METOCLOPRAMIDE HYDROCHLORIDE 5 MG/ML
10 INJECTION INTRAMUSCULAR; INTRAVENOUS EVERY 6 HOURS PRN
Status: DISCONTINUED | OUTPATIENT
Start: 2023-09-05 | End: 2023-09-06 | Stop reason: HOSPADM

## 2023-09-05 RX ORDER — CHLORHEXIDINE GLUCONATE 0.12 MG/ML
15 RINSE ORAL SEE ADMIN INSTRUCTIONS
Status: COMPLETED | OUTPATIENT
Start: 2023-09-05 | End: 2023-09-05

## 2023-09-05 RX ORDER — SODIUM CHLORIDE, SODIUM LACTATE, POTASSIUM CHLORIDE, CALCIUM CHLORIDE 600; 310; 30; 20 MG/100ML; MG/100ML; MG/100ML; MG/100ML
100 INJECTION, SOLUTION INTRAVENOUS CONTINUOUS
Status: DISCONTINUED | OUTPATIENT
Start: 2023-09-05 | End: 2023-09-06 | Stop reason: HOSPADM

## 2023-09-05 RX ORDER — ONDANSETRON 4 MG/1
8 TABLET, FILM COATED ORAL EVERY 6 HOURS PRN
Status: DISCONTINUED | OUTPATIENT
Start: 2023-09-05 | End: 2023-09-06 | Stop reason: HOSPADM

## 2023-09-05 RX ORDER — ROCURONIUM BROMIDE 10 MG/ML
INJECTION, SOLUTION INTRAVENOUS AS NEEDED
Status: DISCONTINUED | OUTPATIENT
Start: 2023-09-05 | End: 2023-09-05 | Stop reason: SURG

## 2023-09-05 RX ORDER — ONDANSETRON 2 MG/ML
8 INJECTION INTRAMUSCULAR; INTRAVENOUS EVERY 6 HOURS PRN
Status: DISCONTINUED | OUTPATIENT
Start: 2023-09-05 | End: 2023-09-06 | Stop reason: HOSPADM

## 2023-09-05 RX ORDER — HYDRALAZINE HYDROCHLORIDE 20 MG/ML
20 INJECTION INTRAMUSCULAR; INTRAVENOUS
Status: DISCONTINUED | OUTPATIENT
Start: 2023-09-05 | End: 2023-09-06 | Stop reason: HOSPADM

## 2023-09-05 RX ORDER — PANTOPRAZOLE SODIUM 40 MG/10ML
40 INJECTION, POWDER, LYOPHILIZED, FOR SOLUTION INTRAVENOUS ONCE
Status: COMPLETED | OUTPATIENT
Start: 2023-09-05 | End: 2023-09-05

## 2023-09-05 RX ORDER — GABAPENTIN 300 MG/1
300 CAPSULE ORAL EVERY 8 HOURS SCHEDULED
Status: DISCONTINUED | OUTPATIENT
Start: 2023-09-05 | End: 2023-09-06 | Stop reason: HOSPADM

## 2023-09-05 RX ORDER — HYDROMORPHONE HYDROCHLORIDE 2 MG/1
2 TABLET ORAL EVERY 4 HOURS PRN
Status: DISCONTINUED | OUTPATIENT
Start: 2023-09-05 | End: 2023-09-06 | Stop reason: HOSPADM

## 2023-09-05 RX ORDER — DIPHENHYDRAMINE HYDROCHLORIDE 50 MG/ML
25 INJECTION INTRAMUSCULAR; INTRAVENOUS EVERY 4 HOURS PRN
Status: DISCONTINUED | OUTPATIENT
Start: 2023-09-05 | End: 2023-09-06 | Stop reason: HOSPADM

## 2023-09-05 RX ORDER — LORAZEPAM 2 MG/ML
1 INJECTION INTRAMUSCULAR ONCE
Status: COMPLETED | OUTPATIENT
Start: 2023-09-05 | End: 2023-09-05

## 2023-09-05 RX ORDER — ENOXAPARIN SODIUM 100 MG/ML
40 INJECTION SUBCUTANEOUS 2 TIMES DAILY
Status: DISCONTINUED | OUTPATIENT
Start: 2023-09-06 | End: 2023-09-06 | Stop reason: HOSPADM

## 2023-09-05 RX ORDER — SCOLOPAMINE TRANSDERMAL SYSTEM 1 MG/1
1 PATCH, EXTENDED RELEASE TRANSDERMAL ONCE
Status: DISCONTINUED | OUTPATIENT
Start: 2023-09-05 | End: 2023-09-05

## 2023-09-05 RX ORDER — SODIUM CHLORIDE 0.9 % (FLUSH) 0.9 %
3 SYRINGE (ML) INJECTION EVERY 12 HOURS SCHEDULED
Status: DISCONTINUED | OUTPATIENT
Start: 2023-09-05 | End: 2023-09-06 | Stop reason: HOSPADM

## 2023-09-05 RX ORDER — ACETAMINOPHEN 10 MG/ML
1000 INJECTION, SOLUTION INTRAVENOUS ONCE
Status: COMPLETED | OUTPATIENT
Start: 2023-09-05 | End: 2023-09-05

## 2023-09-05 RX ORDER — OXYCODONE HYDROCHLORIDE 5 MG/1
5 TABLET ORAL ONCE AS NEEDED
Status: DISCONTINUED | OUTPATIENT
Start: 2023-09-05 | End: 2023-09-05 | Stop reason: HOSPADM

## 2023-09-05 RX ORDER — ACETAMINOPHEN 160 MG/5ML
1000 SOLUTION ORAL EVERY 6 HOURS
Status: DISCONTINUED | OUTPATIENT
Start: 2023-09-05 | End: 2023-09-06 | Stop reason: HOSPADM

## 2023-09-05 RX ORDER — ALBUTEROL SULFATE 2.5 MG/3ML
2.5 SOLUTION RESPIRATORY (INHALATION) EVERY 4 HOURS PRN
Status: DISCONTINUED | OUTPATIENT
Start: 2023-09-05 | End: 2023-09-06 | Stop reason: HOSPADM

## 2023-09-05 RX ORDER — ESCITALOPRAM OXALATE 10 MG/1
20 TABLET ORAL DAILY
Status: DISCONTINUED | OUTPATIENT
Start: 2023-09-05 | End: 2023-09-06 | Stop reason: HOSPADM

## 2023-09-05 RX ORDER — ACETAMINOPHEN 500 MG
1000 TABLET ORAL EVERY 6 HOURS
Status: DISCONTINUED | OUTPATIENT
Start: 2023-09-05 | End: 2023-09-06 | Stop reason: HOSPADM

## 2023-09-05 RX ORDER — MAGNESIUM SULFATE HEPTAHYDRATE 500 MG/ML
INJECTION, SOLUTION INTRAMUSCULAR; INTRAVENOUS AS NEEDED
Status: DISCONTINUED | OUTPATIENT
Start: 2023-09-05 | End: 2023-09-05 | Stop reason: SURG

## 2023-09-05 RX ORDER — DEXAMETHASONE SODIUM PHOSPHATE 4 MG/ML
INJECTION, SOLUTION INTRA-ARTICULAR; INTRALESIONAL; INTRAMUSCULAR; INTRAVENOUS; SOFT TISSUE AS NEEDED
Status: DISCONTINUED | OUTPATIENT
Start: 2023-09-05 | End: 2023-09-05 | Stop reason: SURG

## 2023-09-05 RX ORDER — PROCHLORPERAZINE EDISYLATE 5 MG/ML
10 INJECTION INTRAMUSCULAR; INTRAVENOUS EVERY 6 HOURS PRN
Status: DISCONTINUED | OUTPATIENT
Start: 2023-09-05 | End: 2023-09-06 | Stop reason: HOSPADM

## 2023-09-05 RX ORDER — ONDANSETRON 2 MG/ML
4 INJECTION INTRAMUSCULAR; INTRAVENOUS ONCE AS NEEDED
Status: DISCONTINUED | OUTPATIENT
Start: 2023-09-05 | End: 2023-09-05 | Stop reason: HOSPADM

## 2023-09-05 RX ORDER — METOPROLOL TARTRATE 50 MG/1
50 TABLET, FILM COATED ORAL DAILY
Status: DISCONTINUED | OUTPATIENT
Start: 2023-09-05 | End: 2023-09-06 | Stop reason: HOSPADM

## 2023-09-05 RX ORDER — HYDRALAZINE HYDROCHLORIDE 20 MG/ML
5 INJECTION INTRAMUSCULAR; INTRAVENOUS
Status: DISCONTINUED | OUTPATIENT
Start: 2023-09-05 | End: 2023-09-05 | Stop reason: HOSPADM

## 2023-09-05 RX ORDER — NALOXONE HCL 0.4 MG/ML
0.4 VIAL (ML) INJECTION AS NEEDED
Status: DISCONTINUED | OUTPATIENT
Start: 2023-09-05 | End: 2023-09-05 | Stop reason: HOSPADM

## 2023-09-05 RX ORDER — FAMOTIDINE 10 MG/ML
INJECTION, SOLUTION INTRAVENOUS AS NEEDED
Status: DISCONTINUED | OUTPATIENT
Start: 2023-09-05 | End: 2023-09-05 | Stop reason: SURG

## 2023-09-05 RX ORDER — LABETALOL HYDROCHLORIDE 5 MG/ML
5 INJECTION, SOLUTION INTRAVENOUS
Status: DISCONTINUED | OUTPATIENT
Start: 2023-09-05 | End: 2023-09-05 | Stop reason: HOSPADM

## 2023-09-05 RX ORDER — SODIUM CHLORIDE, SODIUM LACTATE, POTASSIUM CHLORIDE, CALCIUM CHLORIDE 600; 310; 30; 20 MG/100ML; MG/100ML; MG/100ML; MG/100ML
150 INJECTION, SOLUTION INTRAVENOUS CONTINUOUS
Status: DISCONTINUED | OUTPATIENT
Start: 2023-09-05 | End: 2023-09-06 | Stop reason: HOSPADM

## 2023-09-05 RX ORDER — NALOXONE HCL 0.4 MG/ML
0.1 VIAL (ML) INJECTION
Status: DISCONTINUED | OUTPATIENT
Start: 2023-09-05 | End: 2023-09-06 | Stop reason: HOSPADM

## 2023-09-05 RX ORDER — SODIUM PHOSPHATE IN 0.9 % NACL 15MMOL/100
15 PLASTIC BAG, INJECTION (ML) INTRAVENOUS ONCE
Status: COMPLETED | OUTPATIENT
Start: 2023-09-05 | End: 2023-09-06

## 2023-09-05 RX ORDER — EPHEDRINE SULFATE 5 MG/ML
5 INJECTION INTRAVENOUS ONCE AS NEEDED
Status: DISCONTINUED | OUTPATIENT
Start: 2023-09-05 | End: 2023-09-05 | Stop reason: HOSPADM

## 2023-09-05 RX ORDER — SODIUM CHLORIDE 9 MG/ML
40 INJECTION, SOLUTION INTRAVENOUS AS NEEDED
Status: DISCONTINUED | OUTPATIENT
Start: 2023-09-05 | End: 2023-09-05 | Stop reason: HOSPADM

## 2023-09-05 RX ORDER — SODIUM CHLORIDE 0.9 % (FLUSH) 0.9 %
3-10 SYRINGE (ML) INJECTION AS NEEDED
Status: DISCONTINUED | OUTPATIENT
Start: 2023-09-05 | End: 2023-09-05 | Stop reason: HOSPADM

## 2023-09-05 RX ORDER — CYANOCOBALAMIN 1000 UG/ML
1000 INJECTION, SOLUTION INTRAMUSCULAR; SUBCUTANEOUS ONCE
Status: COMPLETED | OUTPATIENT
Start: 2023-09-06 | End: 2023-09-06

## 2023-09-05 RX ORDER — DEXMEDETOMIDINE HYDROCHLORIDE 100 UG/ML
INJECTION, SOLUTION INTRAVENOUS AS NEEDED
Status: DISCONTINUED | OUTPATIENT
Start: 2023-09-05 | End: 2023-09-05 | Stop reason: SURG

## 2023-09-05 RX ORDER — DIPHENHYDRAMINE HYDROCHLORIDE 50 MG/ML
12.5 INJECTION INTRAMUSCULAR; INTRAVENOUS
Status: DISCONTINUED | OUTPATIENT
Start: 2023-09-05 | End: 2023-09-05 | Stop reason: HOSPADM

## 2023-09-05 RX ORDER — ONDANSETRON 2 MG/ML
INJECTION INTRAMUSCULAR; INTRAVENOUS AS NEEDED
Status: DISCONTINUED | OUTPATIENT
Start: 2023-09-05 | End: 2023-09-05 | Stop reason: SURG

## 2023-09-05 RX ADMIN — PROPOFOL 150 MCG/KG/MIN: 10 INJECTION, EMULSION INTRAVENOUS at 09:12

## 2023-09-05 RX ADMIN — SODIUM CHLORIDE, POTASSIUM CHLORIDE, SODIUM LACTATE AND CALCIUM CHLORIDE 500 ML: 600; 310; 30; 20 INJECTION, SOLUTION INTRAVENOUS at 08:15

## 2023-09-05 RX ADMIN — MIDAZOLAM 1 MG: 1 INJECTION INTRAMUSCULAR; INTRAVENOUS at 09:05

## 2023-09-05 RX ADMIN — ONDANSETRON 8 MG: 2 INJECTION INTRAMUSCULAR; INTRAVENOUS at 13:20

## 2023-09-05 RX ADMIN — DIPHENHYDRAMINE HYDROCHLORIDE 12.5 MG: 50 INJECTION, SOLUTION INTRAMUSCULAR; INTRAVENOUS at 11:33

## 2023-09-05 RX ADMIN — SUGAMMADEX 100 MG: 100 INJECTION, SOLUTION INTRAVENOUS at 10:45

## 2023-09-05 RX ADMIN — AMISULPRIDE 10 MG: 2.5 INJECTION, SOLUTION INTRAVENOUS at 10:58

## 2023-09-05 RX ADMIN — HYDROMORPHONE HYDROCHLORIDE 0.5 MG: 1 INJECTION, SOLUTION INTRAMUSCULAR; INTRAVENOUS; SUBCUTANEOUS at 11:17

## 2023-09-05 RX ADMIN — ACETAMINOPHEN 1000 MG: 500 TABLET, FILM COATED ORAL at 14:09

## 2023-09-05 RX ADMIN — ACETAMINOPHEN 1000 MG: 1000 INJECTION INTRAVENOUS at 08:16

## 2023-09-05 RX ADMIN — MAGNESIUM SULFATE HEPTAHYDRATE 0.4 G: 500 INJECTION, SOLUTION INTRAMUSCULAR; INTRAVENOUS at 09:59

## 2023-09-05 RX ADMIN — CHLORHEXIDINE GLUCONATE 15 ML: 1.2 SOLUTION ORAL at 08:20

## 2023-09-05 RX ADMIN — DEXAMETHASONE SODIUM PHOSPHATE 8 MG: 4 INJECTION, SOLUTION INTRAMUSCULAR; INTRAVENOUS at 09:19

## 2023-09-05 RX ADMIN — HYDROMORPHONE HYDROCHLORIDE 0.25 MG: 1 INJECTION, SOLUTION INTRAMUSCULAR; INTRAVENOUS; SUBCUTANEOUS at 10:44

## 2023-09-05 RX ADMIN — PANTOPRAZOLE SODIUM 40 MG: 40 INJECTION, POWDER, LYOPHILIZED, FOR SOLUTION INTRAVENOUS at 08:20

## 2023-09-05 RX ADMIN — GABAPENTIN 300 MG: 300 CAPSULE ORAL at 14:09

## 2023-09-05 RX ADMIN — MAGNESIUM SULFATE HEPTAHYDRATE 0.4 G: 500 INJECTION, SOLUTION INTRAMUSCULAR; INTRAVENOUS at 09:50

## 2023-09-05 RX ADMIN — ROCURONIUM BROMIDE 15 MG: 10 INJECTION, SOLUTION INTRAVENOUS at 10:16

## 2023-09-05 RX ADMIN — HYDROMORPHONE HYDROCHLORIDE 0.25 MG: 1 INJECTION, SOLUTION INTRAMUSCULAR; INTRAVENOUS; SUBCUTANEOUS at 10:19

## 2023-09-05 RX ADMIN — ACETAMINOPHEN 1000 MG: 500 TABLET, FILM COATED ORAL at 20:23

## 2023-09-05 RX ADMIN — FAMOTIDINE 20 MG: 10 INJECTION INTRAVENOUS at 09:03

## 2023-09-05 RX ADMIN — MIDAZOLAM 1 MG: 1 INJECTION INTRAMUSCULAR; INTRAVENOUS at 09:03

## 2023-09-05 RX ADMIN — ROCURONIUM BROMIDE 10 MG: 10 INJECTION, SOLUTION INTRAVENOUS at 09:28

## 2023-09-05 RX ADMIN — LORAZEPAM 1 MG: 2 INJECTION INTRAMUSCULAR; INTRAVENOUS at 08:34

## 2023-09-05 RX ADMIN — SODIUM CHLORIDE, POTASSIUM CHLORIDE, SODIUM LACTATE AND CALCIUM CHLORIDE 100 ML/HR: 600; 310; 30; 20 INJECTION, SOLUTION INTRAVENOUS at 08:15

## 2023-09-05 RX ADMIN — SCOPALAMINE 1 PATCH: 1 PATCH, EXTENDED RELEASE TRANSDERMAL at 08:21

## 2023-09-05 RX ADMIN — GABAPENTIN 300 MG: 300 CAPSULE ORAL at 22:18

## 2023-09-05 RX ADMIN — Medication 3 ML: at 14:21

## 2023-09-05 RX ADMIN — MAGNESIUM SULFATE HEPTAHYDRATE 0.4 G: 500 INJECTION, SOLUTION INTRAMUSCULAR; INTRAVENOUS at 09:42

## 2023-09-05 RX ADMIN — SODIUM CHLORIDE, POTASSIUM CHLORIDE, SODIUM LACTATE AND CALCIUM CHLORIDE 150 ML/HR: 600; 310; 30; 20 INJECTION, SOLUTION INTRAVENOUS at 22:20

## 2023-09-05 RX ADMIN — DEXMEDETOMIDINE HYDROCHLORIDE 4 MCG: 100 INJECTION, SOLUTION INTRAVENOUS at 09:31

## 2023-09-05 RX ADMIN — HYDROMORPHONE HYDROCHLORIDE 0.5 MG: 1 INJECTION, SOLUTION INTRAMUSCULAR; INTRAVENOUS; SUBCUTANEOUS at 10:54

## 2023-09-05 RX ADMIN — MAGNESIUM SULFATE HEPTAHYDRATE 0.4 G: 500 INJECTION, SOLUTION INTRAMUSCULAR; INTRAVENOUS at 09:32

## 2023-09-05 RX ADMIN — DEXMEDETOMIDINE HYDROCHLORIDE 4 MCG: 100 INJECTION, SOLUTION INTRAVENOUS at 10:34

## 2023-09-05 RX ADMIN — DEXMEDETOMIDINE HYDROCHLORIDE 2 MCG: 100 INJECTION, SOLUTION INTRAVENOUS at 09:55

## 2023-09-05 RX ADMIN — FENTANYL CITRATE 50 MCG: 50 INJECTION, SOLUTION INTRAMUSCULAR; INTRAVENOUS at 09:47

## 2023-09-05 RX ADMIN — SUGAMMADEX 200 MG: 100 INJECTION, SOLUTION INTRAVENOUS at 10:38

## 2023-09-05 RX ADMIN — HYDROMORPHONE HYDROCHLORIDE 0.5 MG: 1 INJECTION, SOLUTION INTRAMUSCULAR; INTRAVENOUS; SUBCUTANEOUS at 11:58

## 2023-09-05 RX ADMIN — SUGAMMADEX 100 MG: 100 INJECTION, SOLUTION INTRAVENOUS at 10:43

## 2023-09-05 RX ADMIN — HYOSCYAMINE SULFATE 125 MCG: 0.12 TABLET ORAL; SUBLINGUAL at 22:18

## 2023-09-05 RX ADMIN — MAGNESIUM SULFATE HEPTAHYDRATE 0.4 G: 500 INJECTION, SOLUTION INTRAMUSCULAR; INTRAVENOUS at 09:38

## 2023-09-05 RX ADMIN — ONDANSETRON 4 MG: 2 INJECTION INTRAMUSCULAR; INTRAVENOUS at 10:36

## 2023-09-05 RX ADMIN — FENTANYL CITRATE 50 MCG: 50 INJECTION, SOLUTION INTRAMUSCULAR; INTRAVENOUS at 09:28

## 2023-09-05 RX ADMIN — HYOSCYAMINE SULFATE 125 MCG: 0.12 TABLET ORAL; SUBLINGUAL at 16:13

## 2023-09-05 RX ADMIN — LORAZEPAM 1 MG: 2 INJECTION INTRAMUSCULAR; INTRAVENOUS at 09:00

## 2023-09-05 RX ADMIN — DEXMEDETOMIDINE HYDROCHLORIDE 4 MCG: 100 INJECTION, SOLUTION INTRAVENOUS at 09:47

## 2023-09-05 RX ADMIN — HYDRALAZINE HYDROCHLORIDE 20 MG: 20 INJECTION INTRAMUSCULAR; INTRAVENOUS at 14:09

## 2023-09-05 RX ADMIN — ROCURONIUM BROMIDE 40 MG: 10 INJECTION, SOLUTION INTRAVENOUS at 09:12

## 2023-09-05 RX ADMIN — SODIUM CHLORIDE 3000 MG: 900 INJECTION INTRAVENOUS at 09:15

## 2023-09-05 NOTE — ANESTHESIA PREPROCEDURE EVALUATION
Anesthesia Evaluation     NPO Solid Status: > 8 hours  NPO Liquid Status: > 8 hours           Airway   Mallampati: II  TM distance: >3 FB  Neck ROM: full  No difficulty expected  Dental - normal exam     Pulmonary - normal exam   Cardiovascular - normal exam    (+) hypertension      Neuro/Psych  (+) psychiatric history Anxiety  GI/Hepatic/Renal/Endo    (+) morbid obesity, renal disease stones    Musculoskeletal     Abdominal  - normal exam    Bowel sounds: normal.   Substance History      OB/GYN          Other   arthritis,                   Anesthesia Plan    ASA 3     general and ERAS Protocol   total IV anesthesia  intravenous induction     Anesthetic plan, risks, benefits, and alternatives have been provided, discussed and informed consent has been obtained with: patient.  Pre-procedure education provided  Plan discussed with CRNA.    CODE STATUS:

## 2023-09-05 NOTE — ANESTHESIA POSTPROCEDURE EVALUATION
Patient: Brandi Ash    Procedure Summary       Date: 09/05/23 Room / Location: [facility] OR 08 / [facility] MAIN OR    Anesthesia Start: 0907 Anesthesia Stop: 1101    Procedure: GASTRIC SLEEVE LAPAROSCOPIC WITH DAVINCI ROBOT; hiatal hernia repair (Abdomen) Diagnosis:       Class 3 obesity      (Class 3 obesity [E66.01])    Surgeons: Cari Smith MD Provider: Azael Restrepo MD    Anesthesia Type: general, ERAS Protocol ASA Status: 3            Anesthesia Type: general, ERAS Protocol    Vitals  Vitals Value Taken Time   /75 09/05/23 1155   Temp 98.7 °F (37.1 °C) 09/05/23 1057   Pulse 77 09/05/23 1156   Resp 14 09/05/23 1142   SpO2 93 % 09/05/23 1156   Vitals shown include unvalidated device data.        Post Anesthesia Care and Evaluation    Patient location during evaluation: PACU  Patient participation: complete - patient participated  Level of consciousness: awake  Pain scale: See nurse's notes for pain score.  Pain management: adequate    Airway patency: patent  Anesthetic complications: No anesthetic complications  PONV Status: none  Cardiovascular status: acceptable  Respiratory status: acceptable and spontaneous ventilation  Hydration status: acceptable    Comments: Patient seen and examined postoperatively; vital signs stable; SpO2 greater than or equal to 90%; cardiopulmonary status stable; nausea/vomiting adequately controlled; pain adequately controlled; no apparent anesthesia complications; patient discharged from anesthesia care when discharge criteria were met

## 2023-09-05 NOTE — OP NOTE
PREOPERATIVE DIAGNOSIS:  Morbid obesity with multiple comorbidities as referenced in the most recent history and physical. Body mass index is 52.34 kg/m².      POSTOPERATIVE DIAGNOSIS:  Morbid obesity with multiple comorbidities as referenced in the most recent history and physical.Body mass index is 52.34 kg/m².      PROCEDURES PERFORMED:  1.  Robotic assisted laparoscopic sleeve gastrectomy   2. Robotic assisted laparoscopic paraesophageal hernia repair  SURGEON:  Cari Smith MD FACS, FAMBS    ASSISTANT:  Assistant: Arabella Nguyen APRN    Surgery assisted and facilitated by a certified assistant, who directly resulted in a decreased operative time, anesthetic time, wound exposure, and possibly of an operative wound infection, thereby decreasing patient morbidity and ultimately total expenditures.  The surgical assistant assisted in placement of trochars, take down of the gastrocolic omentum, short gastric vessels and dissection at the angle of His.  Also assisted in retraction of the stomach during stapling so as not to kink the gastric sleeve.  Also assisted in removing of the gastric specimen, closure of the fascial defect as well as closure of the skin incisions.    ANESTHESIA:  General endotracheal.    ESTIMATED BLOOD LOSS:   Less than 25 mL unless dictated below.    FLUIDS:  Crystalloids.    SPECIMENS:  Gastric remnant    DRAINS:  None.    Implant Name Type Inv. Item Serial No.  Lot No. LRB No. Used Action   RELOAD STPLR SUREFORM 60 DAVINCI/X/XI 6ROW 2.5 WHT 1P/U - [id number] Implant RELOAD STPLR SUREFORM 60 DAVINCI/X/XI 6ROW 2.5 WHT 1P/U  INTUITIVE SURGICAL [id number] N/A 2 Implanted   RELOAD STPLR SUREFORM 60 DAVINCI/X/XI 6ROW 3.5 JEAN 1P/U - [id number] Implant RELOAD STPLR SUREFORM 60 DAVINCI/X/XI 6ROW 3.5 JEAN 1P/U  INTUITIVE SURGICAL [id number] N/A 1 Implanted   DEV CLS WND VLOC/PBT KENTRELL NONABS 1/2CIR SZ2/0 27MM 15CM JEAN - [id number] Implant DEV CLS WND VLOC/PBT KENTRELL NONABS 1/2CIR SZ2/0 27MM  15CM JEAN  COVIDIEN [id number] N/A 1 Implanted   DEV CLS WND VLOC/PBT NONABS TPR/PT 1/[id number] SZ1 45CM JEAN - [id number] Implant DEV CLS WND VLOC/PBT NONABS TPR/PT 1/[id number] SZ1 45CM JEAN  COVIDIEN [id number] N/A 1 Implanted   HEMOST ABS SURGICEL SNOW 1X2IN - [id number] Implant HEMOST ABS SURGICEL SNOW 1X2IN  [employer]  DIV OF J AND J [id number] N/A 1 Implanted   RELOAD STPLR SUREFORM 60 DAVINCI/X/XI 6ROW 3.5 JEAN 1P/U - [id number] Implant RELOAD STPLR SUREFORM 60 DAVINCI/X/XI 6ROW 3.5 JEAN 1P/U  INTUITIVE SURGICAL [id number] N/A 1 Implanted   RELOAD STPLR SUREFORM 60 DAVINCI/X/XI 6ROW 2.5 WHT 1P/U - [id number] Implant RELOAD STPLR SUREFORM 60 DAVINCI/X/XI 6ROW 2.5 WHT 1P/U  INTUITIVE SURGICAL [id number] N/A 1 Implanted       COUNTS:  Correct.    COMPLICATIONS:  None.    INDICATIONS:  This patient with morbid obesity and associated comorbidities presents for elective laparoscopic, possible open sleeve gastrectomy.  The patient has received medical clearance to proceed.  The patient has undergone our extensive educational process and consent process and wishes to proceed.        DESCRIPTION OF PROCEDURE:  The patient was brought to the operating room and placed supine upon the operating room table. SCD hose were placed.  The patient underwent uneventful general endotracheal anesthesia per the anesthesiology staff. The abdomen was prepped with ChloraPrep and draped in the usual sterile fashion.  An Ioban was used as well if not allergic.  Depending on the technique to size the gastric sleeve, anesthesia staff either passed a 40-Turkish ViSiGi bougie into the stomach to decompress and then was pulled back to the esophagus.      An 8 mm transverse incision was made at the left midclavicular line about 15 cm inferior to the xiphoid.  The peritoneal cavity was entered under direct camera visualization using a 5  mm 0° laparoscope and an Optiview trocar.  The abdomen was then insufflated to a pressure of 15-16 mmHg with CO2 gas.   Exploratory laparoscopy revealed no evidence of injury from the entrance technique and no significant abnormalities unless addendum dictated below.    The patient was placed in reverse Trendelenburg position.   A 12 mm trocar was placed in the right abdomen.  I then changed the scope to a 30 degree da Karen scope.  Under direct camera visualization two 8 mm trocar was placed in the left lateral midabdominal position.        Next the 30 degree 8 mm scope was brought into the 8 mm port just to the left of the umbilicus and the corresponding trocar was docked to the da Karen robot.  Targeting then took place and then the rest of the trochars were docked to the robot and angled into position.  Instruments were brought in through the trochars in place and into view.          I then took control of the surgical console. A 2-0 V loc suture was used to create a liver Israel by suturing it to the epigastrium and then to the anterior nasima and then back to the epigastrium just to the right of the first stitch.  The gastrocolic omentum was divided with the Vessel Sealer and this proceeded superiorly to the angle of His taking down the short gastric vessels.  All posterior attachments of the lesser sac and posterior aspect of the stomach to the pancreas were taken down as well.          Dissection then proceeded medially taking down the greater curvature with the vessel sealer to just proximal to the pylorus.  The 40-Uzbek orogastric tube was passed back down into the stomach for decompression and later to aid in sizing the sleeve.           I then used the Surefire stapler and a blue load was used to create the gastric sleeve.   There were additional firings to complete the gastric sleeve near the angle of Hiss, see above.  ICG was used for a leak test by insufflating ICG into the orogastric tube and the staple line was closely inspected under firefly and there was no evidence of leak.    The patient had a paraesophageal  hernia defect.  Patients undergoing gastric sleeve at risk for severe GERD and therefore paraesophageal hernia repair was indicated.  Dissection of the esophagus commenced to mobilize it from the surrounding crura and other attachments.  A running 2-0 V-Loc suture was used to close the posterior hiatus and also used this stitch to use as a gastropexy from the right  posterior crura to the posterior stomach.  This was done while a 40 FR ViSiGi was in place. As the bougie was removed there was no herniation above the diaphragm.    Tisseal was used on the staple line. An omentopexy was performed of the entire staple line of the gastric sleeve using a 3-0 V-Loc 180.The specimen was removed through the 12 mm port site.   The liver retractor stitch was removed. The fascia at the 12 mm trocar site incision that was used for extraction was closed with a single 0 Vicryl suture in a figure-of-eight fashion placed under direct laparoscopic camera visualization with a suture passer and tying the knot extracorporeally.  The fascia in the area was infiltrated with local anesthesia. All incisions were then infiltrated with local anesthetic. The remaining trocars were removed under direct camera visualization with no bleeding noted from their sites.  The abdomen was desufflated of gas. The skin in each incision was closed using 4-0 antibiotic impregnated Monocryl in a subcuticular fashion followed by Dermabond.  The patient tolerated the procedure well without complication and was taken to the recovery room in stable condition.  All sponge, needle and instrument counts were correct.

## 2023-09-05 NOTE — ANESTHESIA PROCEDURE NOTES
Airway  Urgency: elective    Date/Time: 9/5/2023 9:13 AM  End Time:9/5/2023 9:15 AM  Airway not difficult    General Information and Staff    Patient location during procedure: OR  CRNA/CAA: Roxana Frias CRNA    Indications and Patient Condition  Indications for airway management: airway protection    Preoxygenated: yes  MILS maintained throughout  Mask difficulty assessment: 1 - vent by mask    Final Airway Details  Final airway type: endotracheal airway      Successful airway: ETT  Cuffed: yes   Successful intubation technique: video laryngoscopy  Facilitating devices/methods: intubating stylet  Endotracheal tube insertion site: oral  Blade: Fatima  Blade size: 3  ETT size (mm): 7.0  Cormack-Lehane Classification: grade I - full view of glottis  Placement verified by: chest auscultation and capnometry   Measured from: lips  ETT/EBT  to lips (cm): 21  Number of attempts at approach: 1  Assessment: lips, teeth, and gum same as pre-op and atraumatic intubation

## 2023-09-05 NOTE — INTERVAL H&P NOTE
H&P reviewed.  The patient was examined and there are no changes to the H&P  NPO AFTER MIDNIGHT; took medications with sips of water @1000 today

## 2023-09-05 NOTE — PLAN OF CARE
"Goal Outcome Evaluation:  Plan of Care Reviewed With: patient, spouse           Outcome Evaluation: Pt drowsy, sleepy, rests intermittent between care. Pt ambulatd in alvarez x2 and sitting in recliner due to discomfort in upper chest. Pain is \"better\" with Levsin sl, nausea managed with zofran.  at bedside. will continue to monitor.         "

## 2023-09-06 ENCOUNTER — READMISSION MANAGEMENT (OUTPATIENT)
Dept: CALL CENTER | Facility: HOSPITAL | Age: 28
End: 2023-09-06
Payer: MEDICAID

## 2023-09-06 VITALS
BODY MASS INDEX: 44.41 KG/M2 | HEART RATE: 97 BPM | RESPIRATION RATE: 16 BRPM | TEMPERATURE: 98.9 F | DIASTOLIC BLOOD PRESSURE: 92 MMHG | OXYGEN SATURATION: 98 % | HEIGHT: 68 IN | WEIGHT: 293 LBS | SYSTOLIC BLOOD PRESSURE: 146 MMHG

## 2023-09-06 LAB
LAB AP CASE REPORT: NORMAL
PATH REPORT.FINAL DX SPEC: NORMAL
PATH REPORT.GROSS SPEC: NORMAL

## 2023-09-06 PROCEDURE — 25810000003 SODIUM CHLORIDE 0.9 % SOLUTION 1,000 ML FLEX CONT: Performed by: SURGERY

## 2023-09-06 PROCEDURE — 25010000002 THIAMINE HCL 200 MG/2ML SOLUTION 2 ML VIAL: Performed by: SURGERY

## 2023-09-06 PROCEDURE — 99024 POSTOP FOLLOW-UP VISIT: CPT | Performed by: SURGERY

## 2023-09-06 PROCEDURE — 25810000003 SODIUM CHLORIDE 0.9 % SOLUTION: Performed by: SURGERY

## 2023-09-06 PROCEDURE — 25010000002 CYANOCOBALAMIN PER 1000 MCG: Performed by: SURGERY

## 2023-09-06 RX ORDER — URSODIOL 250 MG/1
250 TABLET, FILM COATED ORAL 2 TIMES DAILY
Qty: 60 TABLET | Refills: 5 | Status: SHIPPED | OUTPATIENT
Start: 2023-09-06 | End: 2023-10-06

## 2023-09-06 RX ORDER — ONDANSETRON 8 MG/1
8 TABLET, ORALLY DISINTEGRATING ORAL EVERY 8 HOURS PRN
Qty: 30 TABLET | Refills: 5 | Status: SHIPPED | OUTPATIENT
Start: 2023-09-06

## 2023-09-06 RX ORDER — HYDROMORPHONE HYDROCHLORIDE 2 MG/1
2 TABLET ORAL EVERY 6 HOURS PRN
Qty: 18 TABLET | Refills: 0 | Status: SHIPPED | OUTPATIENT
Start: 2023-09-06

## 2023-09-06 RX ADMIN — HYOSCYAMINE SULFATE 125 MCG: 0.12 TABLET ORAL; SUBLINGUAL at 09:19

## 2023-09-06 RX ADMIN — CYANOCOBALAMIN 1000 MCG: 1000 INJECTION, SOLUTION INTRAMUSCULAR; SUBCUTANEOUS at 09:11

## 2023-09-06 RX ADMIN — ACETAMINOPHEN 1000 MG: 500 TABLET, FILM COATED ORAL at 09:13

## 2023-09-06 RX ADMIN — Medication 3 ML: at 09:12

## 2023-09-06 RX ADMIN — SODIUM CHLORIDE 15 MMOL: 9 INJECTION, SOLUTION INTRAVENOUS at 00:13

## 2023-09-06 RX ADMIN — METOPROLOL TARTRATE 50 MG: 50 TABLET ORAL at 09:12

## 2023-09-06 RX ADMIN — THIAMINE HYDROCHLORIDE 100 ML/HR: 100 INJECTION, SOLUTION INTRAMUSCULAR; INTRAVENOUS at 00:13

## 2023-09-06 RX ADMIN — ACETAMINOPHEN 1000 MG: 500 TABLET, FILM COATED ORAL at 02:18

## 2023-09-06 RX ADMIN — GABAPENTIN 300 MG: 300 CAPSULE ORAL at 06:07

## 2023-09-06 RX ADMIN — BUSPIRONE HYDROCHLORIDE 10 MG: 5 TABLET ORAL at 09:12

## 2023-09-06 RX ADMIN — ESCITALOPRAM OXALATE 20 MG: 10 TABLET ORAL at 09:13

## 2023-09-06 NOTE — DISCHARGE SUMMARY
"Discharge Summary    Patient name: Brandi Ash    Medical record number: [id number]    Admission date: 9/5/2023  Discharge date:      Attending physician: Dr. Cari Smith    Primary care physician: Jade Whitlock APRN    Referring physician: Cari Smith MD  [address] [place] [address] [address]    Condition on discharge: Stable    Primary Diagnoses:  Morbid obesity with co-morbidities    Operative Procedure: Robotic Laparoscopic sleeve gastrectomy     Brandi Ash  is post op day one status post procedure listed. Patient denies shortness of air and lower extremity pain. Feels better than yesterday. No vomiting this am. Ambulating well and using incentive spirometer.          /88 (BP Location: Left arm, Patient Position: Sitting)   Pulse 101   Temp 98.3 °F (36.8 °C) (Oral)   Resp 16   Ht 172.7 cm (68\")   Wt (!) 156 kg (344 lb 3.2 oz)   SpO2 97%   BMI 52.34 kg/m²     General:  alert, appears stated age and cooperative   Abdomen: soft, bowel sounds active, appropriate tenderness   Incision:   healing well, no drainage, no erythema, no hernia, no seroma, no swelling, no dehiscence, incision well approximated   Heart: Regular rate   Lungs: Clear to auscultation bilaterally     I reviewed the patient's new clinical results.     Lab Results (last 24 hours)       Procedure Component Value Units Date/Time    Phosphorus [[id number]]  (Abnormal) Collected: 09/05/23 1939    Specimen: Blood Updated: 09/05/23 2030     Phosphorus 1.8 mg/dL     Magnesium [[id number]]  (Normal) Collected: 09/05/23 1939    Specimen: Blood Updated: 09/05/23 2023     Magnesium 2.1 mg/dL     Comprehensive Metabolic Panel [[id number]]  (Abnormal) Collected: 09/05/23 1939    Specimen: Blood Updated: 09/05/23 2023     Glucose 183 mg/dL      BUN 9 mg/dL      Creatinine 0.65 mg/dL      Sodium 134 mmol/L      Potassium 4.3 mmol/L      Chloride 101 mmol/L      CO2 24.0 mmol/L      Calcium 9.4 mg/dL      Total Protein 6.8 g/dL      " Albumin 3.8 g/dL      ALT (SGPT) 138 U/L      AST (SGOT) 136 U/L      Alkaline Phosphatase 64 U/L      Total Bilirubin 0.3 mg/dL      Globulin 3.0 gm/dL      A/G Ratio 1.3 g/dL      BUN/Creatinine Ratio 13.8     Anion Gap 9.0 mmol/L      eGFR 122.4 mL/min/1.73     Narrative:      GFR Normal >60  Chronic Kidney Disease <60  Kidney Failure <15      CBC & Differential [[id number]]  (Abnormal) Collected: 09/05/23 1939    Specimen: Blood Updated: 09/05/23 2004    Narrative:      The following orders were created for panel order CBC & Differential.  Procedure                               Abnormality         Status                     ---------                               -----------         ------                     CBC Auto Differential[[id number]]        Abnormal            Final result                 Please view results for these tests on the individual orders.    CBC Auto Differential [[id number]]  (Abnormal) Collected: 09/05/23 1939    Specimen: Blood Updated: 09/05/23 2004     WBC 12.80 10*3/mm3      RBC 4.40 10*6/mm3      Hemoglobin 11.2 g/dL      Hematocrit 34.7 %      MCV 78.8 fL      MCH 25.4 pg      MCHC 32.2 g/dL      RDW 16.1 %      RDW-SD 47.3 fl      MPV 9.0 fL      Platelets 304 10*3/mm3      Neutrophil % 90.2 %      Lymphocyte % 7.5 %      Monocyte % 2.3 %      Eosinophil % 0.0 %      Basophil % 0.0 %      Neutrophils, Absolute 11.50 10*3/mm3      Lymphocytes, Absolute 1.00 10*3/mm3      Monocytes, Absolute 0.30 10*3/mm3      Eosinophils, Absolute 0.00 10*3/mm3      Basophils, Absolute 0.00 10*3/mm3      nRBC 0.0 /100 WBC     Tissue Pathology Exam [[id number]] Collected: 09/05/23 1034    Specimen: Tissue from Stomach, Greater curvature Updated: 09/05/23 1243               Assessment:      Doing well postoperatively.      Plan:   1. Continue Stage 1 diet  2. Continue with ambulation and Incentive spirometry  3. Plan for d/c home      Hospital Course: The patient is a very pleasant 28 y.o. female that was  admitted to the hospital with with morbid obesity underwent a laparoscopic gastric sleeve.  The next morning the patient was able to tolerate liquids and was ambulating and vital signs and labs were stable.  The patient is discharged home with follow-up in my office next week.      Discharge medications:      Discharge Medications        New Medications        Instructions Start Date   apixaban 5 MG tablet tablet  Commonly known as: ELIQUIS   5 mg, Oral, 2 Times Daily      HYDROmorphone 2 MG tablet  Commonly known as: Dilaudid   2 mg, Oral, Every 6 Hours PRN      ondansetron ODT 8 MG disintegrating tablet  Commonly known as: ZOFRAN-ODT   8 mg, Translingual, Every 8 Hours PRN      ursodiol 250 MG tablet  Commonly known as: ACTIGALL   250 mg, Oral, 2 Times Daily             Continue These Medications        Instructions Start Date   acetaminophen 325 MG tablet  Commonly known as: TYLENOL   650 mg, Oral, Every 6 Hours PRN      busPIRone 10 MG tablet  Commonly known as: BUSPAR   10 mg, Oral, 2 Times Daily      escitalopram 20 MG tablet  Commonly known as: LEXAPRO   20 mg, Oral, Daily      LORazepam 0.5 MG tablet  Commonly known as: ATIVAN   0.5 mg, Oral, 2 Times Daily PRN      metoprolol tartrate 50 MG tablet  Commonly known as: LOPRESSOR   50 mg, Oral, 2 Times Daily      omeprazole 40 MG capsule  Commonly known as: priLOSEC   40 mg, Oral, Daily      vitamin D 1.25 MG (48662 UT) capsule capsule  Commonly known as: ERGOCALCIFEROL   50,000 Units, Oral, Every 7 Days               Discharge instructions:  Per Bariatric manual; per our protocol      Follow-up appointment: Follow up with Dr. Smith in the office as scheduled.  If not already scheduled call for appointment at [phone]

## 2023-09-06 NOTE — OUTREACH NOTE
Prep Survey      Flowsheet Row Responses   [facility] facility patient discharged from? [facility]   Is LACE score < 7 ? Yes   Eligibility [facility]   Date of Admission 09/05/23   Date of Discharge 09/06/23   Discharge Disposition Home or Self Care   Discharge diagnosis Morbid obesity   Does the patient have one of the following disease processes/diagnoses(primary or secondary)? General Surgery   Does the patient have Home health ordered? No   Is there a DME ordered? No   Medication alerts for this patient see AVS   General alerts for this patient Robotic Laparoscopic sleeve gastrectomy with hiatal hernia repair   Prep survey completed? Yes            Roxana VARGAS - Registered Nurse

## 2023-09-06 NOTE — PLAN OF CARE
Goal Outcome Evaluation:  Plan of Care Reviewed With: patient, spouse        Progress: improving       Patient ambulating the halls frequently. Incision sites healing appropriately, pt voiding, pain controlled. Gas pains treated with PRN medication. Discharge education completed and provided. Patient discharged per MD order.

## 2023-09-06 NOTE — PROGRESS NOTES
Nutrition Services    Patient Name: Brandi Ash  YOB: 1995  MRN: [id number]  Admission date: 9/5/2023    PPE Documentation        PPE Worn By Provider N/A   PPE Worn By Patient  N/A     PROGRESS NOTE      Encounter Information: Visited patient this morning - POD 1 VSG. Patient doing well with minimal nausea and pain. Patient has been up walking and using incentive spirometer. Patient drinking well with no issues. Patient has no questions over diet advance and aware she can have 1-2 protein shakes each day when home if desired.        PO Diet: Diet: Liquid Diets; Clear Liquid; Bariatric Stage 1; Texture: Regular Texture (IDDSI 7); Fluid Consistency: Thin (IDDSI 0)   PO Supplements:    PO Intake:  Drinking very well - patient had most of a Gatorade and a small water bottle       Current nutrition support:    Nutrition support review:        Labs (reviewed below): Phos low - no other significant labs        GI Function:         Nutrition Intervention Updates:        Results from last 7 days   Lab Units 09/05/23  1939   SODIUM mmol/L 134*   POTASSIUM mmol/L 4.3   CHLORIDE mmol/L 101   CO2 mmol/L 24.0   BUN mg/dL 9   CREATININE mg/dL 0.65   CALCIUM mg/dL 9.4   BILIRUBIN mg/dL 0.3   ALK PHOS U/L 64   ALT (SGPT) U/L 138*   AST (SGOT) U/L 136*   GLUCOSE mg/dL 183*     Results from last 7 days   Lab Units 09/05/23  1939   MAGNESIUM mg/dL 2.1   PHOSPHORUS mg/dL 1.8*   HEMOGLOBIN g/dL 11.2*   HEMATOCRIT % 34.7     No results found for: COVID19  Lab Results   Component Value Date    HGBA1C 6.10 (H) 04/25/2023       Patient with no further questions. Patient to follow up in office Monday.     RD to follow up per protocol.    Electronically signed by:  Pravin Briones RD  09/06/23 08:08 EDT

## 2023-09-06 NOTE — PLAN OF CARE
Goal Outcome Evaluation: Pt has been doing well since surgery, she has ambulated in the hallway a few times overnight and uses her IS. Voids appropriately, VSS and afebrile. She does have a lot of anxiety about medical issues/complications r/t surgery and has required a lot of reassurance but has relaxed a bit and has been able to sleep. She was c/o gas pain earlier in the night but that has since resolved with PRN medication, gum, and movement. No other concerns at this time.

## 2023-09-06 NOTE — DISCHARGE INSTRUCTIONS
GOING HOME AFTER GASTRIC SLEEVE/ GASTRIC BYPASS SURGERY  [facility] Weight Loss: Post-Operative Information/Instructions  Cari Smith MD  General Patient Instructions for Discharge   - Call Surgeon's office at [phone] for follow-up appointment.    - Be sure you, the patient, have a follow-up appointment to be seen within seven (7) days after discharge. If not, please call [phone] to schedule an appointment. If you are discharged on a Saturday or Sunday, please call Monday to schedule the appointment.  - Contact the Surgeon at [phone] for any questions or concerns, including temperature greater than or equal to 101F, shortness of breath, leg swelling, redness at incision sites, nausea, vomiting, chills, or problems or questions.    - Follow the Gastric Stage 1 Diet    Clear liquids, liquids that are thin, room temperature, non-carbonated, 70 grams of protein.Three days after surgery  if you are tolerating the stage 1 diet, you may then proceed to stage 2 diet, as instructed in the . Do not progress to the stage 2 diet if you are having nausea/vomiting. Refer to the Basic Nutrition and Food Principles guide.  - You may shower. No tub bath for 2 weeks.  - No lifting, pushing, pulling, or tugging >25 pounds for 3 weeks.  - Ambulate every 3 hours while awake minimum for seven (7) days, increase distance daily.  - For the next several weeks, you are at an increased risk for blood clot formation. Therefore, you should walk regularly. You should not sit for prolonged periods of time, more than 45 minutes, without getting up and walking for 5-10 minutes. This includes any car rides, including the drive home from the hospital. If driving any distance greater than 30 miles over the next two (2) weeks, stop every 30-45 minutes and walk for 5-10 minutes each time.  - Continue using Incentive Spirometer and coughing exercises at least every two (2) hours while awake for one week.  - Continue  use of CPAP/BIPAP for diagnosis of sleep apnea as directed.  - No driving or operating machinery allowed while taking narcotic (prescription) pain medication, and until you feel comfortable forcefully applying the brakes if needed. (This usually takes more than 3 days.)    - Make an appointment with your Primary Care Physician within one week post-op to look at your home medications for possible changes or discontinuity.   Medications  - The nurse will provide a list of medications for you to continue at home   - If you received a Lovenox (Enoxaparin) or Apixiban (Eliquis) prescription at pre-op visit with Surgeon, start taking the medicine the morning after discharge unless directed otherwise.    - If you were prescribed Lovenox (Enoxaparin), review the education/teaching material/video with the nurse.    - Take post op pain meds as prescribed as needed.   - Continue Foltx until finished.   - Resume use of Actigall (Ursodiol) one (1) week after surgery if patient still has gallbladder. You should have been given a prescription at your pre-op visit. Contact the office if you do not have the prescription.   - Resume bariatric vitamin regimen as instructed in pre-op education with bariatric coordinator.    - Zegerid or Prilosec OTC (or generic) by mouth once daily for four (4) weeks unless you are already taking a proton pump inhibitor as home medication. Follow dosing instructions on package.   Nausea/Vomiting:  The following are possible causes for nausea/vomiting:  - Drinking too much or too fast.  - Sinus drainage/post nasal drip for allergy sufferers (you may take Sudafed, Claritin, Tylenol Sinus/Allergy, or other decongestants and nose sprays to help with this discomfort).  - Low blood sugar (sweating, shaky, irritable, weakness, dizzy or tunnel-vision) - treatment is to sip 100% fruit juice - no sugar added until symptoms subside.  - Acid in fruit juice - (may dilute with water or avoid).  - Eating or drinking  something that is not on clear liquid (stage 1) diet.  Any nausea/vomiting that prohibits you from keeping fluids down for greater than 24 hours requires a call to the surgeon's office.  Urine:  Use your urine color as a guide to determine if you are drinking enough fluid. The darker the urine, the more fluids you need to drink. Urine should be clear to light yellow if you are getting enough fluid. If you should experience frequency, burning or pain with urination, blood in urine, contact us or your primary care physician for possible UTI (urinary tract infection), which could require antibiotics (liquid preferred).  Bowel Movements:  You may not have a bowel movement for 2-5 days after going home. You may then experience liquid, runny or loose stools for approximately 3-4 weeks following surgery. This would require you to drink even more fluids to prevent dehydration. Some patients may experience constipation, which can be treated with increased fluids, drinking warm liquids, increased activity and the use of a Fleets Enema, Milk of Magnesia, or suppositories. The first couple of bowel movements could be bloody, tarry black or dark maroon in color. This is OK as long as the stool returns to a normal color in 1-2 days. If however, you have frequent or a large amount of bloody or tarry black stools and/or become light-headed or dizzy, you may be bleeding and require urgent attention. Please call us right away.  Abdominal Incisions:  You will have small incisions. Do not scrub incisions, but allow the warm, soapy water to run over the incisions, rinse well, and pat dry. You may use any brand of anti-bacterial soap. Do not use Peroxide or Neosporin type ointments on sites, unless instructed to do so by a surgeon or nurse. Monitor daily for signs/symptoms of infection, which might include: drainage with a foul odor, pain, redness, swelling or heat at the incision sight; fever, body aches and chills. If you suspect  infection or have a fever, give us a call.  Pain:  You will be given a prescription for pain medication to control your pain. If you feel the dose is too strong, you may take half the ordered dose, or you may take Tylenol adult liquid per package instructions for minor pain. Do not take any medications that contain aspirin or aspirin products.  Do not take medications like: Motrin, Aleve, Ibuprofen, Advil, Naproxen, Celebrex, Daypro, Bextra, Meloxicam or other medications commonly used for arthritis or joint pain.  No steroids or cortisone injections. There may be pain, which should improve every few days. Pain should not suddenly get worse or more intense. Pain that suddenly changes and is constant and severe should be called in to the surgeon's office. Any sudden pain in the lower extremities with associated warmth and redness should be called in to the surgeon's office immediately. Do not rub or massage this area, as it could be a blood clot.  Diet:  Remain on the clear liquid diet (stage 1) per your  which includes 70 grams of protein each day,  non carbonated. Since you are not getting hardly any calories it is ok to have some sugar in your drinks. For instance it is ok to have regular Gatorade instead of Gatorade Zero, and it is ok to have juice or sugar in your tea. Three days after surgery  if you are tolerating the stage 1 diet, you may then proceed to stage 2 diet, as instructed in the . Do not progress to the stage 2 diet if you are having nausea/vomiting. Refer to the Basic Nutrition and Food Principles guide.  Medications:  The nurse will let you know which medications you will need to continue once you go home. Do not take any medications that are extended or time released if you had the gastric bypass procedure, OK to take if you had the gastric sleeve procedure. Large capsules can be opened and diluted with clear liquids. Check with your physician or pharmacist as to which  pills may be crushed and which capsules may be opened and diluted safely. If you still have your gall bladder and were prescribed Actigall (Ursodiol), you may resume this medication one week after your surgery. You will remain on Actigall (Ursodiol) for approximately 6 months. The dose is 1 pill, 2 times each day for 6 months.  Activity:  Continue your deep breathing and coughing exercises with your Incentive Spirometer breathing device at least every 3 hours while awake (10 repetitions each time) for one week. May use CPAP. This will help to prevent respiratory problems such as pneumonia. No lifting, pulling or tugging anything over 25 pounds for 3 weeks after surgery. You may shower but no tub baths, hot tubs or swimming for 2 weeks. Moderate walking is recommended every 3 hours while awake minimum, increase distance daily. Further exercise will be discussed at the first post-op visit. No driving or operating machinery allow until off narcotic pain medication and until you feel comfortable forcefully applying the brakes (usually takes 3 or more days). For the next few weeks you are at an increased risk for blood clot formation. Therefore you should walk regularly and you should not sit for prolonged periods of time, more than 45 minutes without getting up and walking for 5-10 minutes. This includes car rides. Including riding home from the hospital. If riding a distance greater than 30 miles over the next 2 weeks stop every 30-45 minutes and walk 5-10 mintues each time. No tanning bed use for 8 weeks after surgery and in general, not recommended due to the increased risk for skin cancer. Incisions will burn/blister very badly with tanning bed use.  Illness:  Your primary care physician should treat general illness such as ear infections, sinus infections, and viral type illnesses, etc. Medications prescribed should be liquid/elixir form when possible, for the first 30 days.  General:  In general, it is recommended  that you weigh yourself no more than once per week. Let the weight come off you and concentrate on more important things. Remember the weight was not gained overnight, nor will it be lost overnight. Gastric Bypass/ Gastric Sleeve weight loss will continue over a period of 12-18 months. Do not  yourself according to how others are doing after surgery, as this will cause unnecessary discouragement.  THE ABOVE ARE GENERAL GUIDELINES TO ASSIST YOU ONCE HOME, IF YOU ARE IN DOUBT, OR YOU HAVE ANY QUESTIONS, CALL US AT THE NUMBERS LISTED BELOW.  IN THE EVENT OF SUDDEN CHEST PAIN, SHORTNESS OF BREATH, OR ANY LIFE THREATENING CONDITION, CALL 911.  Any time you are evaluated or admitted to another facility, please have someone notify the surgeon's office.  Supplements:  70 grams of protein taken EVERY DAY. Remember to drink at least 64 ounces of fluid a day, sipping slowly early on. Increase this amount during the summertime. Sipping slowly will not stretch your new stomach. Drinking too fast or gulping liquids will cause brief discomfort and early could cause staple line disruption (leak). With eating, tiny bites, then chew, chew, chew, and swallow. Lay your fork/spoon down for 2-3 minutes, and then take your next bite. Your pouch will tell you within 1-2 bites if it is going to tolerate what you are eating.   Protein Vendors:  Refer to protein vendors' handout from consult class. You can always find protein drinks at the bariatric office, grocery stores, [employer], drug [employer], [employer], health food stores, and on the Internet. Find one high in protein (15-30 grams per serving) and low carb (less than 18 grams per serving).  Now is a great time to re-read your . Please review specific instructions given to you at discharge by your physician (surgeon).  HOW/WHEN TO CONTACT US:  It is imperative that you contact us with any of the following:    Ÿ fever greater than 101 degrees  Ÿ shortness of breath  Ÿ leg  swelling  Ÿ body aches  Ÿ shaking chills  Ÿ nausea and vomitting  Ÿ pain that has worsened  Ÿ redness at incision sites  Ÿ pus or foul smelling drainage from an incision or wound  Ÿ inability to keep fluids down for more than a day  Ÿ any other condition you feel needs our attention.  [facility] - Bariatric: [phone] call this number anytime 24 hours a day / 7 days a week.  Teach-back Questions to be answered by the patient prior to discharge.   What complications would prompt you to call your doctor when you return home? _________________    What is the purpose of your prescribed medication? ________________  What are some potential side effects of the medications you will be taking at home? _______________

## 2023-09-07 ENCOUNTER — HOSPITAL ENCOUNTER (OUTPATIENT)
Dept: GENERAL RADIOLOGY | Facility: HOSPITAL | Age: 28
Discharge: HOME | End: 2023-09-07
Admitting: NURSE PRACTITIONER
Payer: MEDICAID

## 2023-09-07 ENCOUNTER — TRANSITIONAL CARE MANAGEMENT TELEPHONE ENCOUNTER (OUTPATIENT)
Dept: CALL CENTER | Facility: HOSPITAL | Age: 28
End: 2023-09-07
Payer: MEDICAID

## 2023-09-07 ENCOUNTER — TELEPHONE (OUTPATIENT)
Dept: BARIATRICS/WEIGHT MGMT | Facility: CLINIC | Age: 28
End: 2023-09-07
Payer: MEDICAID

## 2023-09-07 DIAGNOSIS — R06.02 SHORTNESS OF BREATH: ICD-10-CM

## 2023-09-07 PROCEDURE — 71046 X-RAY EXAM CHEST 2 VIEWS: CPT

## 2023-09-07 NOTE — PAYOR COMM NOTE
"This is discharge notification for Brandi Ash  Reference/Auth # [id number]   Pt discharged routine to home on 9/6/23.    Rima Reich, RN, BSN  Utilization Review Nurse  [facility]  Direct & confidential phone # [phone]  Fax # [phone]        Brandi Ash (28 y.o. Female)       Date of Birth   1995    Social Security Number       Address   [address] [place] [address] [place] [address]    Home Phone   [phone]    MRN   [id number]       [place]   [place]    Marital Status                               Admission Date   9/5/23    Admission Type   Elective    Admitting Provider   Cari Smith MD    Attending Provider       Department, Room/Bed   [facility], M409/1       Discharge Date   9/6/2023    Discharge Disposition   Home or Self Care    Discharge Destination                                 Attending Provider: (none)   Allergies: No Known Allergies    Isolation: None   Infection: None   Code Status: Prior    Ht: 172.7 cm (68\")   Wt: 156 kg (344 lb 3.2 oz)    Admission Cmt: None   Principal Problem: Morbid obesity [E66.01]                   Active Insurance as of 9/5/2023       Primary Coverage       Payor Plan Insurance Group Employer/Plan Group    [id number]        Payor Plan Address Payor Plan Phone Number Payor Plan Fax Number Effective Dates    [address]   11/1/2020 - None Entered    [facility] [id number]         Subscriber Name Subscriber Birth Date Member ID       BRANDI ASH 1995 [id number]                     Emergency Contacts        (Rel.) Home Phone Work Phone Mobile Phone    MARLO ASH (Spouse) [phone] -- [phone]                 Operative/Procedure Notes (last 48 hours)        Cari Smith MD at 09/05/23 0926            PREOPERATIVE DIAGNOSIS:  Morbid obesity with multiple comorbidities as referenced in the most recent history and " physical. Body mass index is 52.34 kg/m².      POSTOPERATIVE DIAGNOSIS:  Morbid obesity with multiple comorbidities as referenced in the most recent history and physical.Body mass index is 52.34 kg/m².      PROCEDURES PERFORMED:  1.  Robotic assisted laparoscopic sleeve gastrectomy   2. Robotic assisted laparoscopic paraesophageal hernia repair  SURGEON:  Cari Smith MD FACS, FAMBS    ASSISTANT:  Assistant: Arabella Nguyen APRN    Surgery assisted and facilitated by a certified assistant, who directly resulted in a decreased operative time, anesthetic time, wound exposure, and possibly of an operative wound infection, thereby decreasing patient morbidity and ultimately total expenditures.  The surgical assistant assisted in placement of trochars, take down of the gastrocolic omentum, short gastric vessels and dissection at the angle of His.  Also assisted in retraction of the stomach during stapling so as not to kink the gastric sleeve.  Also assisted in removing of the gastric specimen, closure of the fascial defect as well as closure of the skin incisions.    ANESTHESIA:  General endotracheal.    ESTIMATED BLOOD LOSS:   Less than 25 mL unless dictated below.    FLUIDS:  Crystalloids.    SPECIMENS:  Gastric remnant    DRAINS:  None.    Implant Name Type Inv. Item Serial No.  Lot No. LRB No. Used Action   RELOAD STPLR SUREFORM 60 DAVINCI/X/XI 6ROW 2.5 WHT 1P/U - [id number] Implant RELOAD STPLR SUREFORM 60 DAVINCI/X/XI 6ROW 2.5 WHT 1P/U  INTUITIVE SURGICAL [id number] N/A 2 Implanted   RELOAD STPLR SUREFORM 60 DAVINCI/X/XI 6ROW 3.5 JEAN 1P/U - [id number] Implant RELOAD STPLR SUREFORM 60 DAVINCI/X/XI 6ROW 3.5 JEAN 1P/U  INTUITIVE SURGICAL [id number] N/A 1 Implanted   DEV CLS WND VLOC/PBT KENTRELL NONABS 1/2CIR SZ2/0 27MM 15CM JEAN - [id number] Implant DEV CLS WND VLOC/PBT KENTRELL NONABS 1/2CIR SZ2/0 27MM 15CM JEAN  COVIDIEN [id number] N/A 1 Implanted   DEV CLS WND VLOC/PBT NONABS TPR/PT 1/9UXO70QE SZ1 45CM JEAN - [id number]  Implant DEV CLS WND VLOC/PBT NONABS TPR/PT 1/2COW75AC SZ1 45CM JEAN  COVIDIEN [id number] N/A 1 Implanted   HEMOST ABS SURGICEL SNOW 1X2IN - [id number] Implant HEMOST ABS SURGICEL SNOW 1X2IN  [employer]  DIV OF J AND J [id number] N/A 1 Implanted   RELOAD STPLR SUREFORM 60 DAVINCI/X/XI 6ROW 3.5 JEAN 1P/U - [id number] Implant RELOAD STPLR SUREFORM 60 DAVINCI/X/XI 6ROW 3.5 JEAN 1P/U  INTUITIVE SURGICAL [id number] N/A 1 Implanted   RELOAD STPLR SUREFORM 60 DAVINCI/X/XI 6ROW 2.5 WHT 1P/U - [id number] Implant RELOAD STPLR SUREFORM 60 DAVINCI/X/XI 6ROW 2.5 WHT 1P/U  INTUITIVE SURGICAL [id number] N/A 1 Implanted       COUNTS:  Correct.    COMPLICATIONS:  None.    INDICATIONS:  This patient with morbid obesity and associated comorbidities presents for elective laparoscopic, possible open sleeve gastrectomy.  The patient has received medical clearance to proceed.  The patient has undergone our extensive educational process and consent process and wishes to proceed.        DESCRIPTION OF PROCEDURE:  The patient was brought to the operating room and placed supine upon the operating room table. SCD hose were placed.  The patient underwent uneventful general endotracheal anesthesia per the anesthesiology staff. The abdomen was prepped with ChloraPrep and draped in the usual sterile fashion.  An Ioban was used as well if not allergic.  Depending on the technique to size the gastric sleeve, anesthesia staff either passed a 40-Wolof ViSiGi bougie into the stomach to decompress and then was pulled back to the esophagus.      An 8 mm transverse incision was made at the left midclavicular line about 15 cm inferior to the xiphoid.  The peritoneal cavity was entered under direct camera visualization using a 5  mm 0° laparoscope and an Optiview trocar.  The abdomen was then insufflated to a pressure of 15-16 mmHg with CO2 gas.  Exploratory laparoscopy revealed no evidence of injury from the entrance technique and no significant abnormalities unless  addendum dictated below.    The patient was placed in reverse Trendelenburg position.   A 12 mm trocar was placed in the right abdomen.  I then changed the scope to a 30 degree da Karen scope.  Under direct camera visualization two 8 mm trocar was placed in the left lateral midabdominal position.        Next the 30 degree 8 mm scope was brought into the 8 mm port just to the left of the umbilicus and the corresponding trocar was docked to the da Karen robot.  Targeting then took place and then the rest of the trochars were docked to the robot and angled into position.  Instruments were brought in through the trochars in place and into view.          I then took control of the surgical console. A 2-0 V loc suture was used to create a liver Israel by suturing it to the epigastrium and then to the anterior nasima and then back to the epigastrium just to the right of the first stitch.  The gastrocolic omentum was divided with the Vessel Sealer and this proceeded superiorly to the angle of His taking down the short gastric vessels.  All posterior attachments of the lesser sac and posterior aspect of the stomach to the pancreas were taken down as well.          Dissection then proceeded medially taking down the greater curvature with the vessel sealer to just proximal to the pylorus.  The 40-Arabic orogastric tube was passed back down into the stomach for decompression and later to aid in sizing the sleeve.           I then used the Surefire stapler and a blue load was used to create the gastric sleeve.   There were additional firings to complete the gastric sleeve near the angle of Hiss, see above.  ICG was used for a leak test by insufflating ICG into the orogastric tube and the staple line was closely inspected under firefly and there was no evidence of leak.    The patient had a paraesophageal hernia defect.  Patients undergoing gastric sleeve at risk for severe GERD and therefore paraesophageal hernia repair was  indicated.  Dissection of the esophagus commenced to mobilize it from the surrounding crura and other attachments.  A running 2-0 V-Loc suture was used to close the posterior hiatus and also used this stitch to use as a gastropexy from the right  posterior crura to the posterior stomach.  This was done while a 40 FR ViSiGi was in place. As the bougie was removed there was no herniation above the diaphragm.    Tisseal was used on the staple line. An omentopexy was performed of the entire staple line of the gastric sleeve using a 3-0 V-Loc 180.The specimen was removed through the 12 mm port site.   The liver retractor stitch was removed. The fascia at the 12 mm trocar site incision that was used for extraction was closed with a single 0 Vicryl suture in a figure-of-eight fashion placed under direct laparoscopic camera visualization with a suture passer and tying the knot extracorporeally.  The fascia in the area was infiltrated with local anesthesia. All incisions were then infiltrated with local anesthetic. The remaining trocars were removed under direct camera visualization with no bleeding noted from their sites.  The abdomen was desufflated of gas. The skin in each incision was closed using 4-0 antibiotic impregnated Monocryl in a subcuticular fashion followed by Dermabond.  The patient tolerated the procedure well without complication and was taken to the recovery room in stable condition.  All sponge, needle and instrument counts were correct.         Electronically signed by Cari Smith MD at 09/05/23 1102          Discharge Summary        Cari Smith MD at 09/06/23 0834          Discharge Summary    Patient name: Brandi Ash    Medical record number: [id number]    Admission date: 9/5/2023  Discharge date:      Attending physician: Dr. Cari Smith    Primary care physician: Jade Whitlock APRN    Referring physician: Cari Simth MD  [address] [place] [address]    Condition on  "discharge: Stable    Primary Diagnoses:  Morbid obesity with co-morbidities    Operative Procedure: Robotic Laparoscopic sleeve gastrectomy     Brandi Ash  is post op day one status post procedure listed. Patient denies shortness of air and lower extremity pain. Feels better than yesterday. No vomiting this am. Ambulating well and using incentive spirometer.          /88 (BP Location: Left arm, Patient Position: Sitting)   Pulse 101   Temp 98.3 °F (36.8 °C) (Oral)   Resp 16   Ht 172.7 cm (68\")   Wt (!) 156 kg (344 lb 3.2 oz)   SpO2 97%   BMI 52.34 kg/m²     General:  alert, appears stated age and cooperative   Abdomen: soft, bowel sounds active, appropriate tenderness   Incision:   healing well, no drainage, no erythema, no hernia, no seroma, no swelling, no dehiscence, incision well approximated   Heart: Regular rate   Lungs: Clear to auscultation bilaterally     I reviewed the patient's new clinical results.     Lab Results (last 24 hours)       Procedure Component Value Units Date/Time    Phosphorus [[id number]]  (Abnormal) Collected: 09/05/23 1939    Specimen: Blood Updated: 09/05/23 2030     Phosphorus 1.8 mg/dL     Magnesium [[id number]]  (Normal) Collected: 09/05/23 1939    Specimen: Blood Updated: 09/05/23 2023     Magnesium 2.1 mg/dL     Comprehensive Metabolic Panel [[id number]]  (Abnormal) Collected: 09/05/23 1939    Specimen: Blood Updated: 09/05/23 2023     Glucose 183 mg/dL      BUN 9 mg/dL      Creatinine 0.65 mg/dL      Sodium 134 mmol/L      Potassium 4.3 mmol/L      Chloride 101 mmol/L      CO2 24.0 mmol/L      Calcium 9.4 mg/dL      Total Protein 6.8 g/dL      Albumin 3.8 g/dL      ALT (SGPT) 138 U/L      AST (SGOT) 136 U/L      Alkaline Phosphatase 64 U/L      Total Bilirubin 0.3 mg/dL      Globulin 3.0 gm/dL      A/G Ratio 1.3 g/dL      BUN/Creatinine Ratio 13.8     Anion Gap 9.0 mmol/L      eGFR 122.4 mL/min/1.73     Narrative:      GFR Normal >60  Chronic Kidney Disease " <60  Kidney Failure <15      CBC & Differential [[id number]]  (Abnormal) Collected: 09/05/23 1939    Specimen: Blood Updated: 09/05/23 2004    Narrative:      The following orders were created for panel order CBC & Differential.  Procedure                               Abnormality         Status                     ---------                               -----------         ------                     CBC Auto Differential[[id number]]        Abnormal            Final result                 Please view results for these tests on the individual orders.    CBC Auto Differential [[id number]]  (Abnormal) Collected: 09/05/23 1939    Specimen: Blood Updated: 09/05/23 2004     WBC 12.80 10*3/mm3      RBC 4.40 10*6/mm3      Hemoglobin 11.2 g/dL      Hematocrit 34.7 %      MCV 78.8 fL      MCH 25.4 pg      MCHC 32.2 g/dL      RDW 16.1 %      RDW-SD 47.3 fl      MPV 9.0 fL      Platelets 304 10*3/mm3      Neutrophil % 90.2 %      Lymphocyte % 7.5 %      Monocyte % 2.3 %      Eosinophil % 0.0 %      Basophil % 0.0 %      Neutrophils, Absolute 11.50 10*3/mm3      Lymphocytes, Absolute 1.00 10*3/mm3      Monocytes, Absolute 0.30 10*3/mm3      Eosinophils, Absolute 0.00 10*3/mm3      Basophils, Absolute 0.00 10*3/mm3      nRBC 0.0 /100 WBC     Tissue Pathology Exam [[id number]] Collected: 09/05/23 1034    Specimen: Tissue from Stomach, Greater curvature Updated: 09/05/23 1243               Assessment:      Doing well postoperatively.      Plan:   1. Continue Stage 1 diet  2. Continue with ambulation and Incentive spirometry  3. Plan for d/c home      Hospital Course: The patient is a very pleasant 28 y.o. female that was admitted to the hospital with with morbid obesity underwent a laparoscopic gastric sleeve.  The next morning the patient was able to tolerate liquids and was ambulating and vital signs and labs were stable.  The patient is discharged home with follow-up in my office next week.      Discharge medications:      Discharge  Medications        New Medications        Instructions Start Date   apixaban 5 MG tablet tablet  Commonly known as: ELIQUIS   5 mg, Oral, 2 Times Daily      HYDROmorphone 2 MG tablet  Commonly known as: Dilaudid   2 mg, Oral, Every 6 Hours PRN      ondansetron ODT 8 MG disintegrating tablet  Commonly known as: ZOFRAN-ODT   8 mg, Translingual, Every 8 Hours PRN      ursodiol 250 MG tablet  Commonly known as: ACTIGALL   250 mg, Oral, 2 Times Daily             Continue These Medications        Instructions Start Date   acetaminophen 325 MG tablet  Commonly known as: TYLENOL   650 mg, Oral, Every 6 Hours PRN      busPIRone 10 MG tablet  Commonly known as: BUSPAR   10 mg, Oral, 2 Times Daily      escitalopram 20 MG tablet  Commonly known as: LEXAPRO   20 mg, Oral, Daily      LORazepam 0.5 MG tablet  Commonly known as: ATIVAN   0.5 mg, Oral, 2 Times Daily PRN      metoprolol tartrate 50 MG tablet  Commonly known as: LOPRESSOR   50 mg, Oral, 2 Times Daily      omeprazole 40 MG capsule  Commonly known as: priLOSEC   40 mg, Oral, Daily      vitamin D 1.25 MG (27741 UT) capsule capsule  Commonly known as: ERGOCALCIFEROL   50,000 Units, Oral, Every 7 Days               Discharge instructions:  Per Bariatric manual; per our protocol      Follow-up appointment: Follow up with Dr. Smith in the office as scheduled.  If not already scheduled call for appointment at [phone]    Electronically signed by Cari Smith MD at 09/06/23 3146

## 2023-09-07 NOTE — SIGNIFICANT NOTE
Case Management Discharge Note                Selected Continued Care - Discharged on 9/6/2023 Admission date: 9/5/2023 - Discharge disposition: Home or Self Care              Transportation Services  Private: Car    Final Discharge Disposition Code: (P) 01 - home or self-care

## 2023-09-07 NOTE — TELEPHONE ENCOUNTER
----- Message from CHAUNCEY Hill sent at 9/7/2023  9:25 AM EDT -----  Her chest x ray looks good overall. Some gas noted from recent surgery. She can try gas x or any simethicone based product. Chewing gum also helps sometimes as well as walking. She also had a hiatal hernia repair which could be the cause of some of her pain. If it gets worse she needs to go to the ER for evaluation but should get better over next few days.

## 2023-09-07 NOTE — OUTREACH NOTE
Call Center TCM Note      Flowsheet Row Responses   [facility] patient discharged from? [facility]   Does the patient have one of the following disease processes/diagnoses(primary or secondary)? General Surgery   TCM attempt successful? Yes   Call start time 1036   Call end time 1037   General alerts for this patient Robotic Laparoscopic sleeve gastrectomy with hiatal hernia repair   Discharge diagnosis Morbid obesity   Meds reviewed with patient/caregiver? Yes   Is the patient having any side effects they believe may be caused by any medication additions or changes? No   Does the patient have all medications related to this admission filled (includes all antibiotics, pain medications, etc.) Yes   Is the patient taking all medications as directed (includes completed medication regime)? Yes   Comments Hosp dc fu apt on 9/14/23 with PCP   Does the patient have an appointment with their PCP within 7-14 days of discharge? Yes   Has home health visited the patient within 72 hours of discharge? N/A   Psychosocial issues? No   Did the patient receive a copy of their discharge instructions? Yes   Nursing interventions Reviewed instructions with patient   What is the patient's perception of their health status since discharge? Improving   Nursing interventions Nurse provided patient education   Is the patient /caregiver able to teach back basic post-op care? Drive as instructed by MD in discharge instructions, Take showers only when approved by MD-sponge bathe until then, No tub bath, swimming, or hot tub until instructed by MD, Keep incision areas clean,dry and protected, Lifting as instructed by MD in discharge instructions, Do not remove steri-strips, Continue use of incentive spirometry at least 1 week post discharge   Is the patient/caregiver able to teach back signs and symptoms of incisional infection? Increased drainage or bleeding, Incisional warmth, Pus or odor from incision, Fever, Increased redness, swelling or  pain at the incisonal site   Is the patient/caregiver able to teach back steps to recovery at home? Set small, achievable goals for return to baseline health, Rest and rebuild strength, gradually increase activity, Weigh daily, Practice good oral hygiene, Make a list of questions for surgeon's appointment   If the patient is a current smoker, are they able to teach back resources for cessation? Not a smoker   Is the patient/caregiver able to teach back the hierarchy of who to call/visit for symptoms/problems? PCP, Specialist, Home health nurse, Urgent Care, ED, 911 Yes   TCM call completed? Yes   Call end time 1037            Zee Barnes RN    9/7/2023, 10:38 EDT

## 2023-09-11 ENCOUNTER — OFFICE VISIT (OUTPATIENT)
Dept: BARIATRICS/WEIGHT MGMT | Facility: CLINIC | Age: 28
End: 2023-09-11
Payer: MEDICAID

## 2023-09-11 VITALS
WEIGHT: 293 LBS | OXYGEN SATURATION: 98 % | HEART RATE: 82 BPM | DIASTOLIC BLOOD PRESSURE: 81 MMHG | BODY MASS INDEX: 44.41 KG/M2 | HEIGHT: 68 IN | SYSTOLIC BLOOD PRESSURE: 117 MMHG

## 2023-09-11 DIAGNOSIS — R06.02 SHORTNESS OF BREATH: Primary | ICD-10-CM

## 2023-09-11 PROCEDURE — 1159F MED LIST DOCD IN RCRD: CPT | Performed by: SURGERY

## 2023-09-11 PROCEDURE — 1160F RVW MEDS BY RX/DR IN RCRD: CPT | Performed by: SURGERY

## 2023-09-11 PROCEDURE — 99024 POSTOP FOLLOW-UP VISIT: CPT | Performed by: SURGERY

## 2023-09-11 RX ORDER — DOCUSATE SODIUM 100 MG/1
100 CAPSULE, LIQUID FILLED ORAL 2 TIMES DAILY
COMMUNITY

## 2023-09-11 NOTE — PROGRESS NOTES
[facility] BARIATRIC SURGERY   [address] [place] [address]   [address] [place] [address]  Dept: [phone]  2023      Brandi Ash.  [id number]  [id number]  1995  female      Chief Complaint   Patient presents with    Post-op     1 Week Post-op  23 Gastric Sleeve      GASTRIC SLEEVE LAPAROSCOPIC WITH DAVINCI ROBOT; hiatal hernia repair  2023     Post-Op Bariatric Surgery:     HPI:   Weight loss in the last week:    Wt Readings from Last 10 Encounters:   23 (!) 153 kg (336 lb 12.8 oz)   23 (!) 156 kg (344 lb 3.2 oz)   23 (!) 158 kg (349 lb)   23 (!) 165 kg (363 lb 6.4 oz)   23 (!) 163 kg (359 lb 3.2 oz)   23 (!) 162 kg (357 lb 9.6 oz)   23 (!) 159 kg (350 lb)   23 (!) 160 kg (352 lb 9.6 oz)   23 (!) 158 kg (348 lb)   23 (!) 155 kg (341 lb)       Review of Systems  No dysphagia no nausea no vomiting  No shortness of breath no chest pain  No abdominal pain  No extremity pain or swelling    Past Medical History:   Diagnosis Date    Anxiety     DDD (degenerative disc disease), lumbar     Hypertension     Kidney stone     Panic disorder     PCOS (polycystic ovarian syndrome)     Pre-diabetes     Vitamin D deficiency      Past Surgical History:   Procedure Laterality Date     SECTION      CYSTOSCOPY W/ LASER LITHOTRIPSY      ENDOMETRIAL BIOPSY      ENDOSCOPY N/A 2023    Procedure: ESOPHAGOGASTRODUODENOSCOPY with biopsy x1 area;  Surgeon: Cari Smith MD;  Location: [facility] ENDOSCOPY;  Service: General;  Laterality: N/A;  Post- small hiatal hernia    GASTRIC SLEEVE LAPAROSCOPIC N/A 2023    Procedure: GASTRIC SLEEVE LAPAROSCOPIC WITH DAVINCI ROBOT; hiatal hernia repair;  Surgeon: Cari Smith MD;  Location: [facility] MAIN OR;  Service: Robotics - DaVinci;  Laterality: N/A;    TONSILLECTOMY           The following portions of the patient's  history were reviewed and updated as appropriate: allergies, current medications, past family history, past medical history, past social history, past surgical history, and problem list.    Vitals:    09/11/23 0901   BP: 117/81   Pulse: 82   SpO2: 98%       Physical Exam  Awake and alert  Normal pulmonary effort  Incisions with no erythema and appropriate abdominal tenderness  Extremities with no tenderness and no swelling    Assessment:   Patient Active Problem List   Diagnosis    Mass overlapping multiple quadrants of right breast    Abnormal vaginal bleeding    Acanthosis nigricans    ADHD    Amenorrhea    Anxiety    BMI 50.0-59.9, adult    Elevated blood pressure reading without diagnosis of hypertension    Contraception management    Heartburn    History of renal calculi    Impaired glucose tolerance    Knee pain, right    Morbid obesity    Panic disorder with agoraphobia    Polycystic ovarian syndrome    Posttraumatic stress disorder    Tinea corporis    Need for hepatitis C screening test    Vaginal yeast infection    Impaired fasting glucose    Urinary frequency    Secondary hypertension    Mass of lower outer quadrant of left breast    Pre-operative examination       Post-op, the patient is doing well.     Plan:   Reviewed with patient the importance of following the manual for diet progression. Increase activity as tolerated. Continue increasing daily intake of protein and water.   Return to work: the patient is to return to 3 weeks from their surgery date with no restrictions unless they develop medical problems in which we will see them back in the office. They received a note in our office today with their return to work date.  Activity restrictions: no lifting, pushing or pulling over 25lbs for 3 weeks.   Recommended patient be sure to get at least 70 grams of protein per day. Discussed with the patient the recommended amount of water per day to intake. Reviewed vitamin requirements. Be sure to do  routine exercise and increase activity as tolerated. No asa, nsaids or steroids for 8 weeks. Patient may use miralax as needed if necessary.     Instructions / Recommendations: dietary counseling recommended, recommended a daily protein intake of  grams, vitamin supplement(s) recommended, recommended exercising at least 150 minutes per week, behavior modifications recommended and instructed to call the office for concerns, questions, or problems.     The patient was instructed to follow up at one month follow up appt.     The patient was counseled regarding post op bariatric manual

## 2023-09-18 ENCOUNTER — TELEPHONE (OUTPATIENT)
Dept: BARIATRICS/WEIGHT MGMT | Facility: CLINIC | Age: 28
End: 2023-09-18
Payer: MEDICAID

## 2023-09-18 NOTE — TELEPHONE ENCOUNTER
----- Message from CHAUNCEY Hill sent at 9/18/2023  1:28 PM EDT -----  Regarding: RE: Infected incision   Contact: [phone]  The pictures look ok. It looks like she has a scab over the incision. I would have her monitor it. If it gets red or increased pain we can see her but there is no redness noted in the picture.     ----- Message -----  From: Ariela Damian MA  Sent: 9/18/2023  11:25 AM EDT  To: CHAUNCEY Hill  Subject: FW: Infected incision                              ----- Message -----  From: Brandi Ash  Sent: 9/18/2023   9:11 AM EDT  To: sarah [facility]  Subject: Infected incision                                It had a small pus filled sac above the incision that popped and now it's bleeding a little bit. You can't tell in the picture but it's red around the incision. It is warm.

## 2023-09-18 NOTE — TELEPHONE ENCOUNTER
Advised pt, per Michelle, incisions are not concerning at this point. She will continue to monitor and contact our office if they worsen or do not continue to improve. AKC

## 2023-10-03 ENCOUNTER — HOSPITAL ENCOUNTER (OUTPATIENT)
Dept: CARDIOLOGY | Facility: HOSPITAL | Age: 28
Discharge: HOME | End: 2023-10-03
Admitting: INTERNAL MEDICINE
Payer: MEDICAID

## 2023-10-03 LAB
BH CV ECHO MEAS - ACS: 2.36 CM
BH CV ECHO MEAS - AO MAX PG: 5.7 MMHG
BH CV ECHO MEAS - AO MEAN PG: 3.1 MMHG
BH CV ECHO MEAS - AO ROOT DIAM: 3.2 CM
BH CV ECHO MEAS - AO V2 MAX: 118.9 CM/SEC
BH CV ECHO MEAS - AO V2 VTI: 24.8 CM
BH CV ECHO MEAS - AVA(I,D): 3.1 CM2
BH CV ECHO MEAS - EDV(CUBED): 81.3 ML
BH CV ECHO MEAS - ESV(CUBED): 29 ML
BH CV ECHO MEAS - FS: 29.1 %
BH CV ECHO MEAS - IVS/LVPW: 0.98 CM
BH CV ECHO MEAS - IVSD: 1.17 CM
BH CV ECHO MEAS - LA DIMENSION: 3.8 CM
BH CV ECHO MEAS - LV MASS(C)D: 184 GRAMS
BH CV ECHO MEAS - LV MAX PG: 5.5 MMHG
BH CV ECHO MEAS - LV MEAN PG: 2.7 MMHG
BH CV ECHO MEAS - LV V1 MAX: 117.6 CM/SEC
BH CV ECHO MEAS - LV V1 VTI: 25.1 CM
BH CV ECHO MEAS - LVIDD: 4.3 CM
BH CV ECHO MEAS - LVIDS: 3.1 CM
BH CV ECHO MEAS - LVOT AREA: 3 CM2
BH CV ECHO MEAS - LVOT DIAM: 1.96 CM
BH CV ECHO MEAS - LVPWD: 1.2 CM
BH CV ECHO MEAS - MV A MAX VEL: 60 CM/SEC
BH CV ECHO MEAS - MV DEC SLOPE: 498.5 CM/SEC2
BH CV ECHO MEAS - MV DEC TIME: 0.19 SEC
BH CV ECHO MEAS - MV E MAX VEL: 93.2 CM/SEC
BH CV ECHO MEAS - MV E/A: 1.55
BH CV ECHO MEAS - MV MAX PG: 4.1 MMHG
BH CV ECHO MEAS - MV MEAN PG: 1.8 MMHG
BH CV ECHO MEAS - MV V2 VTI: 24.4 CM
BH CV ECHO MEAS - MVA(VTI): 3.1 CM2
BH CV ECHO MEAS - PA V2 MAX: 104.3 CM/SEC
BH CV ECHO MEAS - PULM A REVS DUR: 0.08 SEC
BH CV ECHO MEAS - PULM A REVS VEL: 27.1 CM/SEC
BH CV ECHO MEAS - PULM DIAS VEL: 44.5 CM/SEC
BH CV ECHO MEAS - PULM S/D: 0.86
BH CV ECHO MEAS - PULM SYS VEL: 38.2 CM/SEC
BH CV ECHO MEAS - RAP SYSTOLE: 8 MMHG
BH CV ECHO MEAS - RV MAX PG: 2.46 MMHG
BH CV ECHO MEAS - RV V1 MAX: 78.4 CM/SEC
BH CV ECHO MEAS - RV V1 VTI: 17.9 CM
BH CV ECHO MEAS - RVDD: 3.9 CM
BH CV ECHO MEAS - SV(LVOT): 76 ML

## 2023-10-03 PROCEDURE — 93306 TTE W/DOPPLER COMPLETE: CPT

## 2023-10-10 NOTE — PROGRESS NOTES
"Nutrition Services    Patient Name: Brandi Ash  YOB: 1995  MRN: [id number]  Date of Service: 10/12/23      ICD-10-CM ICD-9-CM   1. Obesity, Class III, BMI 40-49.9 (morbid obesity)  E66.01 278.01          NUTRITION ASSESSMENT - BARIATRIC SURGERY      Reason for Visit 1 month post op VSG     H&P      Past Medical History:   Diagnosis Date    Anxiety     DDD (degenerative disc disease), lumbar     Hypertension     Kidney stone     Panic disorder     PCOS (polycystic ovarian syndrome)     Pre-diabetes     Vitamin D deficiency        Past Surgical History:   Procedure Laterality Date     SECTION      CYSTOSCOPY W/ LASER LITHOTRIPSY      ENDOMETRIAL BIOPSY      ENDOSCOPY N/A 2023    Procedure: ESOPHAGOGASTRODUODENOSCOPY with biopsy x1 area;  Surgeon: Cari Smith MD;  Location: [facility] ENDOSCOPY;  Service: General;  Laterality: N/A;  Post- small hiatal hernia    GASTRIC SLEEVE LAPAROSCOPIC N/A 2023    Procedure: GASTRIC SLEEVE LAPAROSCOPIC WITH DAVINCI ROBOT; hiatal hernia repair;  Surgeon: Cari Smith MD;  Location: [facility] MAIN OR;  Service: Robotics - DaVinci;  Laterality: N/A;    TONSILLECTOMY          Previous Goals          Encounter Information        Visit Narrative     Patient reports no issues. Patient has been advancing diet and adding in new foods as tolerated. Patient reports some days it's difficult to get in protein but most days is meeting protein goals.     Diet Recall:   Breakfast: egg or protein shake  Lunch: skips sometimes or snacks   Dinner: caned chicken with taco seasoning and cheese or ricotta bake  Snacks: cheese stick or greek yogurt  Beverages: water - close to 64 oz    Exercise: walking dogs currently, has not yet added in the gym.     Supplements: Patient currently taking Bariatric advantage Mv capsule + CA capsule     Self Monitoring:          Anthropometrics        Current Height, Weight Height: 172.7 cm (68\")  Weight: (!) 146 kg (322 lb) " (10/12/23 1246)       Trending Weight Hx  10.5% weight loss in 1 month    Wt Readings from Last 30 Encounters:   10/12/23 1246 (!) 146 kg (322 lb)   09/11/23 0901 (!) 153 kg (336 lb 12.8 oz)   09/05/23 0751 (!) 156 kg (344 lb 3.2 oz)   08/22/23 1629 (!) 163 kg (360 lb)   09/01/23 1153 (!) 158 kg (349 lb)   08/18/23 1002 (!) 165 kg (363 lb 6.4 oz)   08/09/23 0944 (!) 163 kg (359 lb 3.2 oz)   07/13/23 0803 (!) 162 kg (357 lb 9.6 oz)   06/13/23 1335 (!) 159 kg (350 lb)   05/30/23 1135 (!) 156 kg (345 lb)   06/14/23 1426 (!) 160 kg (352 lb 9.6 oz)   05/18/23 1251 (!) 158 kg (348 lb)   04/13/23 1433 (!) 155 kg (341 lb)   03/28/23 1152 (!) 156 kg (345 lb)   03/28/23 0946 (!) 157 kg (345 lb 6.4 oz)   09/22/22 1446 (!) 149 kg (328 lb)   08/25/22 1359 (!) 147 kg (323 lb)   07/12/22 1135 (!) 150 kg (330 lb)   11/15/21 1754 (!) 154 kg (340 lb)   11/15/21 1750 (!) 154 kg (340 lb)   09/14/21 2156 (!) 152 kg (334 lb 14.1 oz)   09/10/21 1443 (!) 154 kg (340 lb 6.2 oz)   06/16/21 1205 (!) 154 kg (339 lb 8.1 oz)   11/05/20 1332 (!) 147 kg (325 lb)   10/27/19 2349 (!) 147 kg (324 lb 8.3 oz)   07/26/19 1108 (!) 146 kg (320 lb 14.4 oz)   07/17/19 1332 (!) 148 kg (325 lb 2.9 oz)   07/15/19 2016 (!) 148 kg (325 lb 6.4 oz)   05/17/19 1432 (!) 148 kg (327 lb)   08/24/18 1120 (!) 147 kg (323 lb 6.4 oz)   04/24/18 1058 (!) 147 kg (323 lb 9.6 oz)   03/02/18 1033 (!) 144 kg (318 lb)   01/04/18 1017 (!) 145 kg (320 lb)      BMI kg/m2 Body mass index is 48.96 kg/mý.       Nutrition Diagnosis         Nutrition Dx Statement Overweight/obesity RT multifactorial biochemical, behavioral and environmental contributors to disease AEB BMI 48.96 kg/m^2         Nutrition Intervention         Nutrition Intervention Nutrition education related to diet modification and physical activity        Monitor/Evaluation        New Goals Patient to continue to aim for 60-80 grams of protein each day  Patient to add in strength training exercises       Total time  spent with pt 30 minutes of which 30 minutes were spent on education.       Electronically signed by:  Pravin Briones RD  10/12/23 12:46 EDT

## 2023-10-12 ENCOUNTER — OFFICE VISIT (OUTPATIENT)
Dept: BARIATRICS/WEIGHT MGMT | Facility: CLINIC | Age: 28
End: 2023-10-12
Payer: MEDICAID

## 2023-10-12 VITALS — WEIGHT: 293 LBS | HEIGHT: 68 IN | BODY MASS INDEX: 44.41 KG/M2

## 2023-10-12 DIAGNOSIS — E66.01 OBESITY, CLASS III, BMI 40-49.9 (MORBID OBESITY): Primary | ICD-10-CM

## 2023-11-05 RX ORDER — DOCUSATE SODIUM 100 MG/1
100 CAPSULE, LIQUID FILLED ORAL 2 TIMES DAILY
Qty: 60 CAPSULE | Refills: 0 | Status: SHIPPED | OUTPATIENT
Start: 2023-11-05

## 2023-11-08 ENCOUNTER — TELEPHONE (OUTPATIENT)
Dept: BARIATRICS/WEIGHT MGMT | Facility: CLINIC | Age: 28
End: 2023-11-08
Payer: MEDICAID

## 2023-11-08 NOTE — TELEPHONE ENCOUNTER
Patient called RDN with questions over carbs/calories in a day. Patient has been tracking and is meeting protein goals. Encouraged patient to continue protein, color and then carbs at meals. Patient also reports she is constipated. Encouraged colace and fiber supplement when more regular. Patient with no further questions at this time.

## 2023-11-29 RX ORDER — URSODIOL 250 MG/1
250 TABLET, FILM COATED ORAL 2 TIMES DAILY
Qty: 60 TABLET | Refills: 3 | Status: SHIPPED | OUTPATIENT
Start: 2023-11-29

## 2024-01-05 ENCOUNTER — APPOINTMENT (OUTPATIENT)
Dept: CT IMAGING | Facility: HOSPITAL | Age: 29
End: 2024-01-05
Payer: MEDICAID

## 2024-01-05 ENCOUNTER — HOSPITAL ENCOUNTER (EMERGENCY)
Facility: HOSPITAL | Age: 29
Discharge: HOME | End: 2024-01-05
Attending: EMERGENCY MEDICINE
Payer: MEDICAID

## 2024-01-05 VITALS
RESPIRATION RATE: 18 BRPM | TEMPERATURE: 98 F | SYSTOLIC BLOOD PRESSURE: 135 MMHG | OXYGEN SATURATION: 100 % | HEIGHT: 68 IN | BODY MASS INDEX: 44.24 KG/M2 | DIASTOLIC BLOOD PRESSURE: 80 MMHG | HEART RATE: 85 BPM | WEIGHT: 291.89 LBS

## 2024-01-05 DIAGNOSIS — N39.0 ACUTE UTI: Primary | ICD-10-CM

## 2024-01-05 DIAGNOSIS — R10.9 FLANK PAIN: ICD-10-CM

## 2024-01-05 DIAGNOSIS — R10.84 GENERALIZED ABDOMINAL PAIN: ICD-10-CM

## 2024-01-05 DIAGNOSIS — R11.0 NAUSEA: ICD-10-CM

## 2024-01-05 LAB
ALBUMIN SERPL-MCNC: 4.2 G/DL (ref 3.5–5.2)
ALBUMIN/GLOB SERPL: 1.4 G/DL
ALP SERPL-CCNC: 95 U/L (ref 39–117)
ALT SERPL W P-5'-P-CCNC: 33 U/L (ref 1–33)
ANION GAP SERPL CALCULATED.3IONS-SCNC: 11 MMOL/L (ref 5–15)
AST SERPL-CCNC: 35 U/L (ref 1–32)
B-HCG UR QL: NEGATIVE
BACTERIA UR QL AUTO: ABNORMAL /HPF
BASOPHILS # BLD AUTO: 0 10*3/MM3 (ref 0–0.2)
BASOPHILS NFR BLD AUTO: 0.8 % (ref 0–1.5)
BILIRUB SERPL-MCNC: 0.2 MG/DL (ref 0–1.2)
BILIRUB UR QL STRIP: NEGATIVE
BUN SERPL-MCNC: 14 MG/DL (ref 6–20)
BUN/CREAT SERPL: 25.9 (ref 7–25)
CALCIUM SPEC-SCNC: 9.4 MG/DL (ref 8.6–10.5)
CHLORIDE SERPL-SCNC: 102 MMOL/L (ref 98–107)
CLARITY UR: ABNORMAL
CO2 SERPL-SCNC: 27 MMOL/L (ref 22–29)
COLOR UR: YELLOW
CREAT SERPL-MCNC: 0.54 MG/DL (ref 0.57–1)
DEPRECATED RDW RBC AUTO: 42 FL (ref 37–54)
EGFRCR SERPLBLD CKD-EPI 2021: 128 ML/MIN/1.73
EOSINOPHIL # BLD AUTO: 0.1 10*3/MM3 (ref 0–0.4)
EOSINOPHIL NFR BLD AUTO: 1.9 % (ref 0.3–6.2)
ERYTHROCYTE [DISTWIDTH] IN BLOOD BY AUTOMATED COUNT: 15 % (ref 12.3–15.4)
GLOBULIN UR ELPH-MCNC: 3.1 GM/DL
GLUCOSE SERPL-MCNC: 91 MG/DL (ref 65–99)
GLUCOSE UR STRIP-MCNC: NEGATIVE MG/DL
HCT VFR BLD AUTO: 38.5 % (ref 34–46.6)
HGB BLD-MCNC: 12.5 G/DL (ref 12–15.9)
HGB UR QL STRIP.AUTO: NEGATIVE
HYALINE CASTS UR QL AUTO: ABNORMAL /LPF
KETONES UR QL STRIP: ABNORMAL
LEUKOCYTE ESTERASE UR QL STRIP.AUTO: ABNORMAL
LIPASE SERPL-CCNC: 26 U/L (ref 13–60)
LYMPHOCYTES # BLD AUTO: 2.9 10*3/MM3 (ref 0.7–3.1)
LYMPHOCYTES NFR BLD AUTO: 52 % (ref 19.6–45.3)
MAGNESIUM SERPL-MCNC: 2 MG/DL (ref 1.6–2.6)
MCH RBC QN AUTO: 26 PG (ref 26.6–33)
MCHC RBC AUTO-ENTMCNC: 32.6 G/DL (ref 31.5–35.7)
MCV RBC AUTO: 79.8 FL (ref 79–97)
MONOCYTES # BLD AUTO: 0.7 10*3/MM3 (ref 0.1–0.9)
MONOCYTES NFR BLD AUTO: 13.3 % (ref 5–12)
NEUTROPHILS NFR BLD AUTO: 1.8 10*3/MM3 (ref 1.7–7)
NEUTROPHILS NFR BLD AUTO: 32 % (ref 42.7–76)
NITRITE UR QL STRIP: NEGATIVE
NRBC BLD AUTO-RTO: 0 /100 WBC (ref 0–0.2)
PH UR STRIP.AUTO: 6.5 [PH] (ref 5–8)
PLATELET # BLD AUTO: 280 10*3/MM3 (ref 140–450)
PMV BLD AUTO: 8.8 FL (ref 6–12)
POTASSIUM SERPL-SCNC: 4 MMOL/L (ref 3.5–5.2)
PROT SERPL-MCNC: 7.3 G/DL (ref 6–8.5)
PROT UR QL STRIP: ABNORMAL
RBC # BLD AUTO: 4.83 10*6/MM3 (ref 3.77–5.28)
RBC # UR STRIP: ABNORMAL /HPF
REF LAB TEST METHOD: ABNORMAL
SODIUM SERPL-SCNC: 140 MMOL/L (ref 136–145)
SP GR UR STRIP: 1.02 (ref 1–1.03)
SQUAMOUS #/AREA URNS HPF: ABNORMAL /HPF
UROBILINOGEN UR QL STRIP: ABNORMAL
WBC # UR STRIP: ABNORMAL /HPF
WBC NRBC COR # BLD AUTO: 5.6 10*3/MM3 (ref 3.4–10.8)

## 2024-01-05 PROCEDURE — 74178 CT ABD&PLV WO CNTR FLWD CNTR: CPT

## 2024-01-05 PROCEDURE — 87086 URINE CULTURE/COLONY COUNT: CPT

## 2024-01-05 PROCEDURE — 85025 COMPLETE CBC W/AUTO DIFF WBC: CPT

## 2024-01-05 PROCEDURE — 83735 ASSAY OF MAGNESIUM: CPT

## 2024-01-05 PROCEDURE — 25010000002 CEFTRIAXONE PER 250 MG

## 2024-01-05 PROCEDURE — 25810000003 SODIUM CHLORIDE 0.9 % SOLUTION

## 2024-01-05 PROCEDURE — 99285 EMERGENCY DEPT VISIT HI MDM: CPT

## 2024-01-05 PROCEDURE — 96365 THER/PROPH/DIAG IV INF INIT: CPT

## 2024-01-05 PROCEDURE — 81001 URINALYSIS AUTO W/SCOPE: CPT

## 2024-01-05 PROCEDURE — 83690 ASSAY OF LIPASE: CPT

## 2024-01-05 PROCEDURE — 81025 URINE PREGNANCY TEST: CPT

## 2024-01-05 PROCEDURE — 25510000001 IOPAMIDOL PER 1 ML: Performed by: EMERGENCY MEDICINE

## 2024-01-05 PROCEDURE — 80053 COMPREHEN METABOLIC PANEL: CPT

## 2024-01-05 RX ORDER — SODIUM CHLORIDE 0.9 % (FLUSH) 0.9 %
10 SYRINGE (ML) INJECTION AS NEEDED
Status: DISCONTINUED | OUTPATIENT
Start: 2024-01-05 | End: 2024-01-05 | Stop reason: HOSPADM

## 2024-01-05 RX ORDER — CEFDINIR 300 MG/1
300 CAPSULE ORAL 2 TIMES DAILY
Qty: 10 CAPSULE | Refills: 0 | Status: SHIPPED | OUTPATIENT
Start: 2024-01-05 | End: 2024-01-10

## 2024-01-05 RX ORDER — ONDANSETRON 4 MG/1
4 TABLET, ORALLY DISINTEGRATING ORAL EVERY 8 HOURS PRN
Qty: 15 TABLET | Refills: 0 | Status: SHIPPED | OUTPATIENT
Start: 2024-01-05 | End: 2024-01-10

## 2024-01-05 RX ADMIN — IOPAMIDOL 100 ML: 755 INJECTION, SOLUTION INTRAVENOUS at 05:20

## 2024-01-05 RX ADMIN — CEFTRIAXONE 2000 MG: 2 INJECTION, POWDER, FOR SOLUTION INTRAMUSCULAR; INTRAVENOUS at 06:32

## 2024-01-05 RX ADMIN — SODIUM CHLORIDE 1000 ML: 9 INJECTION, SOLUTION INTRAVENOUS at 03:56

## 2024-01-05 NOTE — DISCHARGE INSTRUCTIONS
Take Zofran as needed for nausea.  Take antibiotics as directed and finish the entire course.  Rest.  Increase your fluid intake and avoid dehydrating compounds such as caffeine, coffee, tea and soda.  Practice good perineal care and wipe front to back.    Follow-up with gastroenterologist for further evaluation and management of abdominal pain as needed.  Follow-up with primary care provider as needed.    Return to the ER for new or worsening symptoms.

## 2024-01-05 NOTE — ED PROVIDER NOTES
"Subjective   History of Present Illness  Patient is a 28-year-old obese  female with recent history of gastric sleeve surgery who presents the emergency room with complaints of generalized abdominal pain that started about 2 hours prior to her arrival.  She states that it feels like \"period cramps\" in her upper abdomen around her stomach.  She states that the pain was mild before she ate but worsened afterwards.  She also reports bilateral pain in her kidneys.  She has had history of kidney stones and relates the pain to that.  She had gastric sleeve surgery done by Dr. Smith about 4 months ago with hernia repair as well.  She reports nausea with no significant vomiting or diarrhea.  She does report not having a bowel movement for several days.  She has her gallbladder and appendix still.  She has had no changes in medication.  She denies use of drugs, alcohol tobacco.    GI Surg: Luis  PCP: Kamlesh      Review of Systems   Constitutional:  Negative for chills and fever.   HENT:  Negative for congestion and rhinorrhea.    Respiratory:  Negative for cough and shortness of breath.    Cardiovascular:  Negative for chest pain.   Gastrointestinal:  Positive for abdominal pain and nausea. Negative for vomiting.   Genitourinary:  Positive for flank pain. Negative for dysuria.   Musculoskeletal:  Negative for arthralgias.   Neurological:  Negative for syncope and headaches.   Psychiatric/Behavioral:  The patient is not nervous/anxious.        Past Medical History:   Diagnosis Date    Anxiety     DDD (degenerative disc disease), lumbar     Hypertension     Kidney stone     Panic disorder     PCOS (polycystic ovarian syndrome)     Pre-diabetes     Vitamin D deficiency        No Known Allergies    Past Surgical History:   Procedure Laterality Date     SECTION      CYSTOSCOPY W/ LASER LITHOTRIPSY      ENDOMETRIAL BIOPSY      ENDOSCOPY N/A 2023    Procedure: ESOPHAGOGASTRODUODENOSCOPY with biopsy x1 area; "  Surgeon: Cari Smith MD;  Location: [facility] ENDOSCOPY;  Service: General;  Laterality: N/A;  Post- small hiatal hernia    GASTRIC SLEEVE LAPAROSCOPIC N/A 9/5/2023    Procedure: GASTRIC SLEEVE LAPAROSCOPIC WITH DAVINCI ROBOT; hiatal hernia repair;  Surgeon: Cari Smith MD;  Location: [facility] MAIN OR;  Service: Robotics - DaVinci;  Laterality: N/A;    TONSILLECTOMY  2003       Family History   Problem Relation Age of Onset    Hypertension Mother     Obesity Mother     Sleep apnea Mother     Obesity Maternal Grandmother     Hypertension Maternal Grandmother     Sleep apnea Maternal Grandmother        Social History     Socioeconomic History    Marital status:    Tobacco Use    Smoking status: Former     Packs/day: 0.50     Years: 10.00     Additional pack years: 0.00     Total pack years: 5.00     Types: Cigarettes     Quit date: 2021     Years since quitting: 3.0     Passive exposure: Past    Smokeless tobacco: Never   Vaping Use    Vaping Use: Every day    Last attempt to quit: 7/4/2023    Passive vaping exposure: Yes   Substance and Sexual Activity    Alcohol use: Yes     Comment: social    Drug use: No    Sexual activity: Yes     Partners: Male           Objective   Physical Exam  Vitals and nursing note reviewed.   Constitutional:       General: She is not in acute distress.     Appearance: She is well-developed. She is obese. She is not ill-appearing.   HENT:      Head: Normocephalic and atraumatic.   Cardiovascular:      Rate and Rhythm: Normal rate and regular rhythm.      Heart sounds: Normal heart sounds. No murmur heard.  Pulmonary:      Effort: No respiratory distress.      Breath sounds: Normal breath sounds.   Abdominal:      General: Abdomen is protuberant. Bowel sounds are decreased.      Palpations: Abdomen is soft.      Tenderness:  in the epigastric area   Neurological:      Mental Status: She is alert.         Procedures           ED Course      /96   Pulse 80   Temp 98 °F (36.7 °C)  "(Oral)   Resp 16   Ht 172.7 cm (68\")   Wt 132 kg (291 lb 14.2 oz)   LMP 12/29/2023 (Approximate)   SpO2 100%   BMI 44.38 kg/m²   Labs Reviewed   COMPREHENSIVE METABOLIC PANEL - Abnormal; Notable for the following components:       Result Value    Creatinine 0.54 (*)     AST (SGOT) 35 (*)     BUN/Creatinine Ratio 25.9 (*)     All other components within normal limits    Narrative:     GFR Normal >60  Chronic Kidney Disease <60  Kidney Failure <15     URINALYSIS W/ MICROSCOPIC IF INDICATED (NO CULTURE) - Abnormal; Notable for the following components:    Appearance, UA Turbid (*)     Ketones, UA Trace (*)     Protein, UA Trace (*)     Leuk Esterase, UA Large (3+) (*)     All other components within normal limits   CBC WITH AUTO DIFFERENTIAL - Abnormal; Notable for the following components:    MCH 26.0 (*)     Neutrophil % 32.0 (*)     Lymphocyte % 52.0 (*)     Monocyte % 13.3 (*)     All other components within normal limits   URINALYSIS, MICROSCOPIC ONLY - Abnormal; Notable for the following components:    RBC, UA 3-5 (*)     WBC, UA Too Numerous to Count (*)     Bacteria, UA 4+ (*)     Squamous Epithelial Cells, UA 21-30 (*)     All other components within normal limits   LIPASE - Normal   MAGNESIUM - Normal   PREGNANCY, URINE - Normal   URINALYSIS W/ CULTURE IF INDICATED   CBC AND DIFFERENTIAL    Narrative:     The following orders were created for panel order CBC & Differential.  Procedure                               Abnormality         Status                     ---------                               -----------         ------                     CBC Auto Differential[[id number]]        Abnormal            Final result                 Please view results for these tests on the individual orders.     Medications   sodium chloride 0.9 % flush 10 mL (has no administration in time range)   cefTRIAXone (ROCEPHIN) 2,000 mg in sodium chloride 0.9 % 100 mL IVPB (has no administration in time range)   sodium " chloride 0.9 % bolus 1,000 mL (1,000 mL Intravenous New Bag 1/5/24 8459)   iopamidol (ISOVUE-370) 76 % injection 100 mL (100 mL Intravenous Given 1/5/24 8811)     CT Abd With & Without Contrast Pelvis With Contrast    Result Date: 1/5/2024  Impression: 1.No acute abdominal or pelvic abnormality. 2.Status post gastric sleeve. Electronically Signed: Jayjay Peters MD  1/5/2024 5:30 AM EST  Workstation ID: [id number]                                          Medical Decision Making  Amount and/or Complexity of Data Reviewed  Labs: ordered.  Radiology: ordered.    Risk  Prescription drug management.    Patient is a 28-year-old obese  female with history of gastric sleeve 4 months ago who presents to the emergency room with complaints of upper abdominal pain and nausea that started 2 hours prior to her ER arrival.  On exam, patient is normal S1/S2 without clicks or murmurs.  No JVD.  Lungs are clear to auscultation in all fields.  Her abdomen is soft with tenderness noted in the upper epigastric area with decreased bowel sounds throughout.  No CVA tenderness.  Initial differentials include pyelonephritis, acute upper UTI, acute cholecystitis, acute pancreatitis, peptic ulcer disease.  This is not a complete list.    IV was established labs were obtained.  Patient received the above examination.  At time of examination, her symptoms were managed with Zofran and Toradol.  She received a fluid bolus as well.  CBC and CMP are unremarkable.  Her urinalysis reveals 4+ bacteria with large amount of leukocytes and white blood cells but is a contaminated sample.  Acute pancreatitis ruled out withNormal lipase. Urinalysis with bacteria, white blood cells and leukocytes concerning for acute UTI.  It was a contaminated sample but a urine culture was ordered.  She received Rocephin while in the emergency room. My interpretation of CT reveals no ureteral stones, bowel obstruction or free air in the abdomen.  This concurrent with  radiologist, who notes no acute abnormalities.  Upon reassessment, patient has had no bouts of vomiting or increases in pain.  Results were discussed with the patient and she was offered a repeat urinalysis due to contamination.  At this time, patient does not need to urinate.  given her bilateral flank pain it is concerning that she may have a kidney infection.  Concern for contamination with urine culture was discussed with the patient and she was informed that this may not be a good sample for culture.  Patient still elects to treat UTI without repeated urinalysis.  She will be given a prescription of Zofran and cefdinir with instruction to follow-up to her gastroenterologist and primary care provider.  She verbalized understanding is agreeable to plan of care.  She is in no acute distress and has been able to ambulate upright steadily without assistance upon discharge.    I discussed the findings with patient who voices understanding of discharge instructions, signs and symptoms requiring return to the ED; discharged improved and stable condition with follow-up for reevaluation.    Patient is aware that discharge does not mean that nothing is wrong but it indicates no emergency is present and they must continue care with follow-up as given below or physician of their choice.    This document is intended for medical expert use only.  Reading of this document by patients and/or patient's family without participating medical staff guidance may result in misinterpretation and unintended morbidity.  Any interpretation of such data is the responsibility of the patient and/or family member responsible for the patient in concert with their primary or specialist providers, not to be left for sources of online search as such as [employer], [employer] or similar queries.  Relying on these approaches to knowledge may result in misinterpretation, misguided goals of care and even death should patient or family members try  recommendations outside of the realm of professional medical care in a supervised inpatient environment.    This medical document was created using Dragon dictation system. Some errors in speech recognition may occur.    Final diagnoses:   Generalized abdominal pain   Nausea   Acute UTI   Flank pain       ED Disposition  ED Disposition       ED Disposition   Discharge    Condition   Stable    Comment   --               WhitlockJade, APRN  [address] [facility]  [address]  [place] [address]  [phone]          Cari Smith MD  [address] [facility]  [address]  [place] [address]  [phone]               Medication List        New Prescriptions      cefdinir 300 MG capsule  Commonly known as: OMNICEF  Take 1 capsule by mouth 2 (Two) Times a Day for 5 days.            Changed      busPIRone 10 MG tablet  Commonly known as: BUSPAR  Take 1 tablet by mouth 2 (Two) Times a Day.  What changed:   when to take this  additional instructions     metoprolol tartrate 50 MG tablet  Commonly known as: LOPRESSOR  Take 1 tablet by mouth 2 (Two) Times a Day.  What changed: when to take this     * ondansetron ODT 8 MG disintegrating tablet  Commonly known as: ZOFRAN-ODT  Place 1 tablet on the tongue Every 8 (Eight) Hours As Needed for Nausea or Vomiting.  What changed: Another medication with the same name was added. Make sure you understand how and when to take each.     * ondansetron ODT 4 MG disintegrating tablet  Commonly known as: ZOFRAN-ODT  Place 1 tablet on the tongue Every 8 (Eight) Hours As Needed for Nausea or Vomiting for up to 5 days.  What changed: You were already taking a medication with the same name, and this prescription was added. Make sure you understand how and when to take each.           * This list has 2 medication(s) that are the same as other medications prescribed for you. Read the directions carefully, and ask your doctor or other care provider to review them with you.                   Where to Get Your  Medications        These medications were sent to [facility] - [place] - [address] [phone]  - [phone] FX  [address] [address]      Phone: [phone]   cefdinir 300 MG capsule  ondansetron ODT 4 MG disintegrating tablet            Stefany Vo, APRN  01/05/24 0516

## 2024-01-05 NOTE — ED NOTES
Pt arrived via PV c/o abdominal pain that started approx 2 hours ago. Pt reports gastric sleeve surgery in September. Pt also reports bilateral flank pain

## 2024-01-06 LAB — BACTERIA SPEC AEROBE CULT: NORMAL

## 2024-02-12 ENCOUNTER — TELEPHONE (OUTPATIENT)
Dept: BARIATRICS/WEIGHT MGMT | Facility: CLINIC | Age: 29
End: 2024-02-12
Payer: MEDICAID

## 2024-03-29 ENCOUNTER — TELEPHONE (OUTPATIENT)
Dept: BARIATRICS/WEIGHT MGMT | Facility: CLINIC | Age: 29
End: 2024-03-29

## 2024-04-01 ENCOUNTER — TELEMEDICINE (OUTPATIENT)
Dept: BARIATRICS/WEIGHT MGMT | Facility: CLINIC | Age: 29
End: 2024-04-01
Payer: MEDICAID

## 2024-04-01 VITALS — HEIGHT: 68 IN | BODY MASS INDEX: 40.77 KG/M2 | WEIGHT: 269 LBS

## 2024-04-01 DIAGNOSIS — R42 DIZZINESS: ICD-10-CM

## 2024-04-01 DIAGNOSIS — Z90.3 H/O GASTRIC SLEEVE: ICD-10-CM

## 2024-04-01 DIAGNOSIS — K59.00 CONSTIPATION, UNSPECIFIED CONSTIPATION TYPE: ICD-10-CM

## 2024-04-01 DIAGNOSIS — E66.01 OBESITY, CLASS III, BMI 40-49.9 (MORBID OBESITY): Primary | ICD-10-CM

## 2024-04-01 RX ORDER — AMOXICILLIN 250 MG
1 CAPSULE ORAL DAILY PRN
Qty: 90 TABLET | Refills: 1 | Status: SHIPPED | OUTPATIENT
Start: 2024-04-01

## 2024-04-01 NOTE — PROGRESS NOTES
[facility] BARIATRIC SURGERY  [address] [place] [address]  [address] [place] [address]  Dept: [phone]  4/1/2024      Brandi Ash.  [id number]  [id number]  1995  female    You have chosen to receive care through a video visit. Do you consent to use a video visit for your medical care todat> yes      Date of last surgery: 9/5/2023GASTRIC SLEEVE LAPAROSCOPIC WITH DAVINCI ROBOT; hiatal hernia repair      Chief Complaint: BH Post-Op Bariatric Surgery:   Brandi Ash is status post procedure listed above  HPI:     Wt Readings from Last 10 Encounters:   01/05/24 132 kg (291 lb 14.2 oz)   10/12/23 (!) 146 kg (322 lb)   09/11/23 (!) 153 kg (336 lb 12.8 oz)   09/05/23 (!) 156 kg (344 lb 3.2 oz)   09/01/23 (!) 158 kg (349 lb)   08/18/23 (!) 165 kg (363 lb 6.4 oz)   08/09/23 (!) 163 kg (359 lb 3.2 oz)   07/13/23 (!) 162 kg (357 lb 9.6 oz)   06/13/23 (!) 159 kg (350 lb)   06/14/23 (!) 160 kg (352 lb 9.6 oz)        Today's weight is 269    pounds,BMI  40.90 has a  loss of 31 pounds since the last visit andHIS@ weight loss since surgery is 75 pounds. The patient reports a decreased portion size and loss of appetite.      Brandi Ash denies reflux/heartburn, nausea, and vomiting     Diet and Exercise: Diet history reviewed and discussed with the patient. Weight loss/gains to date discussed with the patient.     She reports eating 3 meals per day, a typical portion size of 1/2-3/4 cup, eating 1 snacks per day, drinking 8 or more 8-oz. glasses of water per day, no carbonated beverage consumption and exercising regularly.       The patient states they are eating 60 grams of protein per day.     Breakfast: varies- protein coffee , egg with turkey sausage   Lunch: 2-3 oz of chicken and greens / salad  Dinner: chicken or ground turkey, veggies   Snacks: greek yogurt, 0 sugar jerky, cheese stick, fruit  Drinks: protein coffee, water,  no carbonation  Exercise: 3-5 miles a day walking,     Multi- one a day bariatric, Having constipation- using stool softeners  but still having some constipation      Review of Systems   Constitutional:  Positive for activity change and appetite change.   Respiratory: Negative.     Cardiovascular: Negative.    Gastrointestinal:  Positive for constipation.   Musculoskeletal: Negative.    Neurological:  Positive for dizziness.         Patient Active Problem List   Diagnosis    Mass overlapping multiple quadrants of right breast    Abnormal vaginal bleeding    Acanthosis nigricans    ADHD    Amenorrhea    Anxiety    BMI 50.0-59.9, adult    Elevated blood pressure reading without diagnosis of hypertension    Contraception management    Heartburn    History of renal calculi    Impaired glucose tolerance    Knee pain, right    Morbid obesity    Panic disorder with agoraphobia    Polycystic ovarian syndrome    Posttraumatic stress disorder    Tinea corporis    Need for hepatitis C screening test    Vaginal yeast infection    Impaired fasting glucose    Urinary frequency    Secondary hypertension    Mass of lower outer quadrant of left breast    Pre-operative examination       Past Medical History:   Diagnosis Date    Anxiety     DDD (degenerative disc disease), lumbar     Hypertension     Kidney stone     Panic disorder     PCOS (polycystic ovarian syndrome)     Pre-diabetes     Vitamin D deficiency      Past Surgical History:   Procedure Laterality Date    TONSILLECTOMY       SECTION      ENDOSCOPY N/A 2023    Procedure: ESOPHAGOGASTRODUODENOSCOPY with biopsy x1 area;  Surgeon: Cari Smith MD;  Location: [facility] ENDOSCOPY;  Service: General;  Laterality: N/A;  Post- small hiatal hernia    GASTRIC SLEEVE LAPAROSCOPIC N/A 2023    Procedure: GASTRIC SLEEVE LAPAROSCOPIC WITH DAVINCI ROBOT; hiatal hernia repair;  Surgeon: Cari Smith MD;  Location: [facility] MAIN OR;  Service: Robotics - DaVinci;   Laterality: N/A;    CYSTOSCOPY W/ LASER LITHOTRIPSY      ENDOMETRIAL BIOPSY        The following portions of the patient's history were reviewed and updated as appropriate: allergies, current medications, past medical history, past social history, past surgical history, and problem list.    Height 68 inches, weight 269 pounds, BMI 40.90  Physical Exam  Constitutional:       Appearance: Normal appearance. She is obese.   Cardiovascular:      Comments: Unable to assess due to video visit   Pulmonary:      Comments: Unable to assess due to video visit   Abdominal:      Comments: Unable to assess due to video visit    Neurological:      General: No focal deficit present.      Mental Status: She is alert and oriented to person, place, and time.   Psychiatric:         Mood and Affect: Mood normal.         Behavior: Behavior normal.         Thought Content: Thought content normal.         Judgment: Judgment normal.             Assessment:   BMI 40.90, class 3 obesity, 6 month gastric sleeve     Post-op, the patient is doing great. She is getting in around 60 grams of protein in her diet a day. She is walking several miles a day. Encourage 20-30 minutes of exercise 2-3 days a week including strength training. Plan to check 6 month labs including iron as pt has hx of iron deficiency anemia before sx. Plan to follow up in 6 months.     She is getting light headed and dizzy with changing potions- likely orthostatic hypotension. She is not on any blood pressure meds any more. Encourage changing positions slowly and try electrolyte drinks like G2, powerade 0, liquid iv.     Pt is having constipation also. Has tried colace / stool softeners with results. Will also send in senna PRN if needed.     Plan:     Encouraged patient to be sure to get plenty of lean protein per day through small frequent meals all with a protein source.   Activity restrictions: none.   Recommended patient be sure to get at least 70 grams of protein per  day by eating small, frequent meals all with high lean protein choices. Be sure to limit/cut back on daily carbohydrate intake. Discussed with the patient the recommended amount of water per day to intake- half of body weight in ounces. Reviewed vitamin requirements. Be sure to do routine exercise, 150 minutes per week minimum, including both cardio and strength training.     Instructions / Recommendations: dietary counseling recommended, recommended a daily protein intake of  grams, vitamin supplement(s) recommended, recommended exercising at least 150 minutes per week, behavior modifications recommended and instructed to call the office for concerns, questions, or problems.     The patient was instructed to follow up in 6 months.     The patient was counseled regarding diet and exercise/ orthostatic hypotension. Total time spent face to face was 20 minutes and 15 minutes was spent counseling.     CHAUNCEY Rosario  [facility]ariatrics

## 2024-04-02 ENCOUNTER — TELEPHONE (OUTPATIENT)
Dept: BARIATRICS/WEIGHT MGMT | Facility: CLINIC | Age: 29
End: 2024-04-02
Payer: MEDICAID

## 2024-04-02 NOTE — TELEPHONE ENCOUNTER
Called left message on voicemail to call the office back to schedule a 1 year FU, so she would need an appt 6months from now

## 2024-04-16 ENCOUNTER — LAB (OUTPATIENT)
Dept: LAB | Facility: HOSPITAL | Age: 29
End: 2024-04-16
Payer: COMMERCIAL

## 2024-04-16 DIAGNOSIS — E66.01 OBESITY, CLASS III, BMI 40-49.9 (MORBID OBESITY): ICD-10-CM

## 2024-04-16 DIAGNOSIS — Z90.3 H/O GASTRIC SLEEVE: ICD-10-CM

## 2024-04-16 LAB
25(OH)D3 SERPL-MCNC: 39.6 NG/ML (ref 30–100)
ALBUMIN SERPL-MCNC: 4.1 G/DL (ref 3.5–5.2)
ALBUMIN/GLOB SERPL: 1.4 G/DL
ALP SERPL-CCNC: 91 U/L (ref 39–117)
ALT SERPL W P-5'-P-CCNC: 21 U/L (ref 1–33)
ANION GAP SERPL CALCULATED.3IONS-SCNC: 9.5 MMOL/L (ref 5–15)
AST SERPL-CCNC: 13 U/L (ref 1–32)
BASOPHILS # BLD AUTO: 0.04 10*3/MM3 (ref 0–0.2)
BASOPHILS NFR BLD AUTO: 0.5 % (ref 0–1.5)
BILIRUB SERPL-MCNC: 0.4 MG/DL (ref 0–1.2)
BUN SERPL-MCNC: 12 MG/DL (ref 6–20)
BUN/CREAT SERPL: 16.2 (ref 7–25)
CALCIUM SPEC-SCNC: 9.8 MG/DL (ref 8.6–10.5)
CHLORIDE SERPL-SCNC: 100 MMOL/L (ref 98–107)
CO2 SERPL-SCNC: 26.5 MMOL/L (ref 22–29)
CREAT SERPL-MCNC: 0.74 MG/DL (ref 0.57–1)
DEPRECATED RDW RBC AUTO: 39.8 FL (ref 37–54)
EGFRCR SERPLBLD CKD-EPI 2021: 112.5 ML/MIN/1.73
EOSINOPHIL # BLD AUTO: 0.07 10*3/MM3 (ref 0–0.4)
EOSINOPHIL NFR BLD AUTO: 0.9 % (ref 0.3–6.2)
ERYTHROCYTE [DISTWIDTH] IN BLOOD BY AUTOMATED COUNT: 13.4 % (ref 12.3–15.4)
GLOBULIN UR ELPH-MCNC: 2.9 GM/DL
GLUCOSE SERPL-MCNC: 87 MG/DL (ref 65–99)
HCT VFR BLD AUTO: 38.7 % (ref 34–46.6)
HGB BLD-MCNC: 12.3 G/DL (ref 12–15.9)
IMM GRANULOCYTES # BLD AUTO: 0.01 10*3/MM3 (ref 0–0.05)
IMM GRANULOCYTES NFR BLD AUTO: 0.1 % (ref 0–0.5)
IRON 24H UR-MRATE: 59 MCG/DL (ref 37–145)
IRON SATN MFR SERPL: 14 % (ref 20–50)
LYMPHOCYTES # BLD AUTO: 2.98 10*3/MM3 (ref 0.7–3.1)
LYMPHOCYTES NFR BLD AUTO: 37.6 % (ref 19.6–45.3)
MAGNESIUM SERPL-MCNC: 2.1 MG/DL (ref 1.6–2.6)
MCH RBC QN AUTO: 26.2 PG (ref 26.6–33)
MCHC RBC AUTO-ENTMCNC: 31.8 G/DL (ref 31.5–35.7)
MCV RBC AUTO: 82.5 FL (ref 79–97)
MONOCYTES # BLD AUTO: 0.46 10*3/MM3 (ref 0.1–0.9)
MONOCYTES NFR BLD AUTO: 5.8 % (ref 5–12)
NEUTROPHILS NFR BLD AUTO: 4.37 10*3/MM3 (ref 1.7–7)
NEUTROPHILS NFR BLD AUTO: 55.1 % (ref 42.7–76)
NRBC BLD AUTO-RTO: 0 /100 WBC (ref 0–0.2)
PHOSPHATE SERPL-MCNC: 3.4 MG/DL (ref 2.5–4.5)
PLATELET # BLD AUTO: 275 10*3/MM3 (ref 140–450)
PMV BLD AUTO: 12 FL (ref 6–12)
POTASSIUM SERPL-SCNC: 4.2 MMOL/L (ref 3.5–5.2)
PROT SERPL-MCNC: 7 G/DL (ref 6–8.5)
RBC # BLD AUTO: 4.69 10*6/MM3 (ref 3.77–5.28)
SODIUM SERPL-SCNC: 136 MMOL/L (ref 136–145)
TIBC SERPL-MCNC: 416 MCG/DL (ref 298–536)
TRANSFERRIN SERPL-MCNC: 279 MG/DL (ref 200–360)
WBC NRBC COR # BLD AUTO: 7.93 10*3/MM3 (ref 3.4–10.8)

## 2024-04-16 PROCEDURE — 83735 ASSAY OF MAGNESIUM: CPT

## 2024-04-16 PROCEDURE — 83540 ASSAY OF IRON: CPT

## 2024-04-16 PROCEDURE — 85025 COMPLETE CBC W/AUTO DIFF WBC: CPT

## 2024-04-16 PROCEDURE — 36415 COLL VENOUS BLD VENIPUNCTURE: CPT

## 2024-04-16 PROCEDURE — 84466 ASSAY OF TRANSFERRIN: CPT

## 2024-04-16 PROCEDURE — 80053 COMPREHEN METABOLIC PANEL: CPT

## 2024-04-16 PROCEDURE — 82306 VITAMIN D 25 HYDROXY: CPT

## 2024-04-16 PROCEDURE — 83921 ORGANIC ACID SINGLE QUANT: CPT

## 2024-04-16 PROCEDURE — 84100 ASSAY OF PHOSPHORUS: CPT

## 2024-04-18 ENCOUNTER — HOSPITAL ENCOUNTER (EMERGENCY)
Facility: HOSPITAL | Age: 29
Discharge: HOME | End: 2024-04-18
Payer: COMMERCIAL

## 2024-04-18 VITALS
OXYGEN SATURATION: 98 % | RESPIRATION RATE: 20 BRPM | SYSTOLIC BLOOD PRESSURE: 119 MMHG | HEART RATE: 85 BPM | HEIGHT: 68 IN | DIASTOLIC BLOOD PRESSURE: 71 MMHG | TEMPERATURE: 98 F | WEIGHT: 165 LBS | BODY MASS INDEX: 25.01 KG/M2

## 2024-04-18 DIAGNOSIS — F10.920 ALCOHOLIC INTOXICATION WITHOUT COMPLICATION: Primary | ICD-10-CM

## 2024-04-18 LAB
ALBUMIN SERPL-MCNC: 4.2 G/DL (ref 3.5–5.2)
ALBUMIN/GLOB SERPL: 1.2 G/DL
ALP SERPL-CCNC: 93 U/L (ref 39–117)
ALT SERPL W P-5'-P-CCNC: 19 U/L (ref 1–33)
AMPHET+METHAMPHET UR QL: NEGATIVE
ANION GAP SERPL CALCULATED.3IONS-SCNC: 14 MMOL/L (ref 5–15)
AST SERPL-CCNC: 20 U/L (ref 1–32)
B-HCG UR QL: NEGATIVE
BARBITURATES UR QL SCN: NEGATIVE
BASOPHILS # BLD AUTO: 0.05 10*3/MM3 (ref 0–0.2)
BASOPHILS NFR BLD AUTO: 0.5 % (ref 0–1.5)
BENZODIAZ UR QL SCN: NEGATIVE
BILIRUB SERPL-MCNC: 0.2 MG/DL (ref 0–1.2)
BILIRUB UR QL STRIP: NEGATIVE
BUN SERPL-MCNC: 11 MG/DL (ref 6–20)
BUN/CREAT SERPL: 16.4 (ref 7–25)
CALCIUM SPEC-SCNC: 9.2 MG/DL (ref 8.6–10.5)
CANNABINOIDS SERPL QL: NEGATIVE
CHLORIDE SERPL-SCNC: 105 MMOL/L (ref 98–107)
CLARITY UR: CLEAR
CO2 SERPL-SCNC: 24 MMOL/L (ref 22–29)
COCAINE UR QL: NEGATIVE
COLOR UR: YELLOW
CREAT SERPL-MCNC: 0.67 MG/DL (ref 0.57–1)
DEPRECATED RDW RBC AUTO: 39.6 FL (ref 37–54)
EGFRCR SERPLBLD CKD-EPI 2021: 121.5 ML/MIN/1.73
EOSINOPHIL # BLD AUTO: 0.04 10*3/MM3 (ref 0–0.4)
EOSINOPHIL NFR BLD AUTO: 0.4 % (ref 0.3–6.2)
ERYTHROCYTE [DISTWIDTH] IN BLOOD BY AUTOMATED COUNT: 13.2 % (ref 12.3–15.4)
ETHANOL UR QL: 0.15 %
GLOBULIN UR ELPH-MCNC: 3.6 GM/DL
GLUCOSE BLDC GLUCOMTR-MCNC: 106 MG/DL (ref 70–105)
GLUCOSE SERPL-MCNC: 107 MG/DL (ref 65–99)
GLUCOSE UR STRIP-MCNC: NEGATIVE MG/DL
HCT VFR BLD AUTO: 40.4 % (ref 34–46.6)
HGB BLD-MCNC: 12.8 G/DL (ref 12–15.9)
HGB UR QL STRIP.AUTO: NEGATIVE
IMM GRANULOCYTES # BLD AUTO: 0.02 10*3/MM3 (ref 0–0.05)
IMM GRANULOCYTES NFR BLD AUTO: 0.2 % (ref 0–0.5)
KETONES UR QL STRIP: NEGATIVE
LEUKOCYTE ESTERASE UR QL STRIP.AUTO: NEGATIVE
LYMPHOCYTES # BLD AUTO: 3.44 10*3/MM3 (ref 0.7–3.1)
LYMPHOCYTES NFR BLD AUTO: 37.1 % (ref 19.6–45.3)
MCH RBC QN AUTO: 26.3 PG (ref 26.6–33)
MCHC RBC AUTO-ENTMCNC: 31.7 G/DL (ref 31.5–35.7)
MCV RBC AUTO: 83 FL (ref 79–97)
METHADONE UR QL SCN: NEGATIVE
MONOCYTES # BLD AUTO: 0.47 10*3/MM3 (ref 0.1–0.9)
MONOCYTES NFR BLD AUTO: 5.1 % (ref 5–12)
NEUTROPHILS NFR BLD AUTO: 5.24 10*3/MM3 (ref 1.7–7)
NEUTROPHILS NFR BLD AUTO: 56.7 % (ref 42.7–76)
NITRITE UR QL STRIP: NEGATIVE
NRBC BLD AUTO-RTO: 0 /100 WBC (ref 0–0.2)
OPIATES UR QL: NEGATIVE
OXYCODONE UR QL SCN: NEGATIVE
PH UR STRIP.AUTO: 8 [PH] (ref 5–8)
PLATELET # BLD AUTO: 296 10*3/MM3 (ref 140–450)
PMV BLD AUTO: 11.3 FL (ref 6–12)
POTASSIUM SERPL-SCNC: 4.1 MMOL/L (ref 3.5–5.2)
PROT SERPL-MCNC: 7.8 G/DL (ref 6–8.5)
PROT UR QL STRIP: NEGATIVE
RBC # BLD AUTO: 4.87 10*6/MM3 (ref 3.77–5.28)
SODIUM SERPL-SCNC: 143 MMOL/L (ref 136–145)
SP GR UR STRIP: 1.01 (ref 1–1.03)
UROBILINOGEN UR QL STRIP: NORMAL
WBC NRBC COR # BLD AUTO: 9.26 10*3/MM3 (ref 3.4–10.8)

## 2024-04-18 PROCEDURE — 82077 ASSAY SPEC XCP UR&BREATH IA: CPT | Performed by: PHYSICIAN ASSISTANT

## 2024-04-18 PROCEDURE — 25010000002 THIAMINE PER 100 MG: Performed by: PHYSICIAN ASSISTANT

## 2024-04-18 PROCEDURE — 96365 THER/PROPH/DIAG IV INF INIT: CPT

## 2024-04-18 PROCEDURE — 96375 TX/PRO/DX INJ NEW DRUG ADDON: CPT

## 2024-04-18 PROCEDURE — 81003 URINALYSIS AUTO W/O SCOPE: CPT | Performed by: PHYSICIAN ASSISTANT

## 2024-04-18 PROCEDURE — 80307 DRUG TEST PRSMV CHEM ANLYZR: CPT | Performed by: PHYSICIAN ASSISTANT

## 2024-04-18 PROCEDURE — 25810000003 SODIUM CHLORIDE 0.9 % SOLUTION: Performed by: PHYSICIAN ASSISTANT

## 2024-04-18 PROCEDURE — 85025 COMPLETE CBC W/AUTO DIFF WBC: CPT | Performed by: PHYSICIAN ASSISTANT

## 2024-04-18 PROCEDURE — 99283 EMERGENCY DEPT VISIT LOW MDM: CPT

## 2024-04-18 PROCEDURE — 82948 REAGENT STRIP/BLOOD GLUCOSE: CPT

## 2024-04-18 PROCEDURE — 80053 COMPREHEN METABOLIC PANEL: CPT | Performed by: PHYSICIAN ASSISTANT

## 2024-04-18 PROCEDURE — 81025 URINE PREGNANCY TEST: CPT | Performed by: PHYSICIAN ASSISTANT

## 2024-04-18 RX ORDER — THIAMINE HYDROCHLORIDE 100 MG/ML
200 INJECTION, SOLUTION INTRAMUSCULAR; INTRAVENOUS ONCE
Status: COMPLETED | OUTPATIENT
Start: 2024-04-18 | End: 2024-04-18

## 2024-04-18 RX ORDER — SODIUM CHLORIDE 0.9 % (FLUSH) 0.9 %
10 SYRINGE (ML) INJECTION AS NEEDED
Status: DISCONTINUED | OUTPATIENT
Start: 2024-04-18 | End: 2024-04-18 | Stop reason: HOSPADM

## 2024-04-18 RX ORDER — SODIUM CHLORIDE 9 MG/ML
1000 INJECTION, SOLUTION INTRAVENOUS ONCE
Status: COMPLETED | OUTPATIENT
Start: 2024-04-18 | End: 2024-04-18

## 2024-04-18 RX ADMIN — THIAMINE HYDROCHLORIDE 200 MG: 100 INJECTION, SOLUTION INTRAMUSCULAR; INTRAVENOUS at 07:22

## 2024-04-18 RX ADMIN — SODIUM CHLORIDE 1000 ML: 900 INJECTION, SOLUTION INTRAVENOUS at 07:05

## 2024-04-18 NOTE — ED PROVIDER NOTES
Subjective   History of Present Illness  Patient is a 28-year-old female PMH significant for anxiety, hypertension, PCOS, vitamin D deficiency, obesity presenting to the ED via EMS from home per family reports she started drinking at 2200 and was found in the yard.  Outside for unknown amount of time.  They also report recent gastric sleeve surgery on 2023 with Dr. Smith.  Patient is going in and out of alertness not really speaking to me at this time therefore history is limited.  No obvious trauma noted by myself or EMS.    History provided by:  EMS personnel      Review of Systems   Unable to perform ROS: Other (Acute intoxication)       Past Medical History:   Diagnosis Date    Anxiety     DDD (degenerative disc disease), lumbar     Hypertension     Kidney stone     Panic disorder     PCOS (polycystic ovarian syndrome)     Pre-diabetes     Vitamin D deficiency        No Known Allergies    Past Surgical History:   Procedure Laterality Date     SECTION      CYSTOSCOPY W/ LASER LITHOTRIPSY      ENDOMETRIAL BIOPSY      ENDOSCOPY N/A 2023    Procedure: ESOPHAGOGASTRODUODENOSCOPY with biopsy x1 area;  Surgeon: Cari Smith MD;  Location: [facility] ENDOSCOPY;  Service: General;  Laterality: N/A;  Post- small hiatal hernia    GASTRIC SLEEVE LAPAROSCOPIC N/A 2023    Procedure: GASTRIC SLEEVE LAPAROSCOPIC WITH DAVINCI ROBOT; hiatal hernia repair;  Surgeon: Cari Smith MD;  Location: [facility] MAIN OR;  Service: Robotics - DaVinci;  Laterality: N/A;    TONSILLECTOMY         Family History   Problem Relation Age of Onset    Hypertension Mother     Obesity Mother     Sleep apnea Mother     Obesity Maternal Grandmother     Hypertension Maternal Grandmother     Sleep apnea Maternal Grandmother        Social History     Socioeconomic History    Marital status:    Tobacco Use    Smoking status: Former     Current packs/day: 0.00     Average packs/day: 0.5 packs/day for 10.0 years (5.0 ttl pk-yrs)      Types: Cigarettes     Start date: 2011     Quit date: 2021     Years since quitting: 3.2     Passive exposure: Past    Smokeless tobacco: Never   Vaping Use    Vaping status: Every Day    Last attempt to quit: 7/4/2023    Passive vaping exposure: Yes   Substance and Sexual Activity    Alcohol use: Yes     Comment: social    Drug use: No    Sexual activity: Yes     Partners: Male           Objective   Physical Exam  Vitals and nursing note reviewed.   Constitutional:       General: She is not in acute distress.     Appearance: She is well-developed. She is obese. She is not ill-appearing, toxic-appearing or diaphoretic.   HENT:      Head: Normocephalic and atraumatic.      Mouth/Throat:      Mouth: Mucous membranes are moist.      Pharynx: Oropharynx is clear.   Eyes:      General: No scleral icterus.     Extraocular Movements: Extraocular movements intact.      Pupils: Pupils are equal, round, and reactive to light.   Cardiovascular:      Rate and Rhythm: Normal rate and regular rhythm.      Pulses: Normal pulses.      Heart sounds: No murmur heard.     No friction rub. No gallop.   Pulmonary:      Effort: Pulmonary effort is normal. No respiratory distress.      Breath sounds: Normal breath sounds. No stridor. No wheezing, rhonchi or rales.   Chest:      Chest wall: No tenderness.   Abdominal:      General: Bowel sounds are normal. There are no signs of injury.      Palpations: Abdomen is soft.      Tenderness: There is no abdominal tenderness. There is no guarding or rebound.   Skin:     General: Skin is warm.      Capillary Refill: Capillary refill takes less than 2 seconds.      Coloration: Skin is not cyanotic, jaundiced or pale.      Findings: No rash.   Neurological:      Mental Status: She is alert.      Comments: Obtunded with slurred speech when she does respond.  Withdraws from painful stimuli.   Psychiatric:         Attention and Perception: She is inattentive.         Behavior: Behavior is slowed.     "     Cognition and Memory: Cognition is impaired.         Procedures           ED Course  ED Course as of 04/18/24 0847   Thu Apr 18, 2024   0705 POC Glucose Once(!) [AA]   0728 Family now at bedside  [AA]   0748 Ethanol [AA]      ED Course User Index  [AA] Vicenta Crowley PA    /71   Pulse 83   Temp 98 °F (36.7 °C) (Oral)   Resp 20   Ht 172.7 cm (68\")   Wt 74.8 kg (165 lb)   SpO2 99%   BMI 25.09 kg/m²   Medications   sodium chloride 0.9 % flush 10 mL (has no administration in time range)   sodium chloride 0.9 % infusion 1,000 mL (0 mL Intravenous Stopped 4/18/24 0822)   folic acid 1 mg in sodium chloride 0.9 % 50 mL IVPB (0 mg Intravenous Stopped 4/18/24 0823)   thiamine (B-1) injection 200 mg (200 mg Intravenous Given 4/18/24 0722)     Labs Reviewed   COMPREHENSIVE METABOLIC PANEL - Abnormal; Notable for the following components:       Result Value    Glucose 107 (*)     All other components within normal limits    Narrative:     GFR Normal >60  Chronic Kidney Disease <60  Kidney Failure <15     CBC WITH AUTO DIFFERENTIAL - Abnormal; Notable for the following components:    MCH 26.3 (*)     Lymphocytes, Absolute 3.44 (*)     All other components within normal limits   POCT GLUCOSE FINGERSTICK - Abnormal; Notable for the following components:    Glucose 106 (*)     All other components within normal limits   URINE DRUG SCREEN - Normal    Narrative:     Negative Thresholds Per Drugs Screened:    Amphetamines                 500 ng/ml  Barbiturates                 200 ng/ml  Benzodiazepines              100 ng/ml  Cocaine                      300 ng/ml  Methadone                    300 ng/ml  Opiates                      300 ng/ml  Oxycodone                    100 ng/ml  THC                           50 ng/ml    The Normal Value for all drugs tested is negative. This report includes final unconfirmed screening results to be used for medical treatment purposes only. Unconfirmed results must not be " used for non-medical purposes such as employment or legal testing. Clinical consideration should be applied to any drug of abuse test, particularly when unconfirmed results are used.          All urine drugs of abuse requests without chain of custody are for medical screening purposes only.  False positives are possible.     PREGNANCY, URINE - Normal   URINALYSIS W/ MICROSCOPIC IF INDICATED (NO CULTURE) - Normal    Narrative:     Urine microscopic not indicated.   ETHANOL    Narrative:     Plasma Ethanol Clinical Symptoms:    ETOH (%)               Clinical Symptom  .01-.05              No apparent influence  .03-.12              Euphoria, Diminished judgment and attention   .09-.25              Impaired comprehension, Muscle incoordination  .18-.30              Confusion, Staggered gait, Slurred speech  .25-.40              Markedly decreased response to stimuli, unable to stand or                        walk, vomitting, sleep or stupor  .35-.50              Comatose, Anesthesia, Subnormal body temperature       CBC AND DIFFERENTIAL    Narrative:     The following orders were created for panel order CBC & Differential.  Procedure                               Abnormality         Status                     ---------                               -----------         ------                     CBC Auto Differential[[id number]]        Abnormal            Final result                 Please view results for these tests on the individual orders.     No orders to display                                              Medical Decision Making    Differentials: Acute intoxication, electrolyte abnormality, dehydration, hypoglycemia      ;this list is not all inclusive and does not constitute the entirety of considered causes  Labs: As above    Disposition/Treatment:  Appropriate PPE was worn during exam and throughout all encounters with the patient.  On arrival to the ED patient was obtunded through ED stay she is now alert and  oriented she is ambulatory with upright steady gait speech is clear and appropriate patient presented after drinking alcohol yesterday evening.  On reassessment she has no complaints.  Abdomen is soft and nontender on exam.  Labs were obtained which showed. Ethanol level of 0.151.  Urine drug screen negative.  CBC and metabolic panel fairly unremarkable no signs of severe infection, electrolyte abnormality, or dehydration.  Urinalysis showed no signs of UTI.  Findings were discussed with the patient and family at bedside.  Patient will drive family home they were all in agreement with plan of discharge.  Patient voiced understanding of discharge along with signs and symptoms to return.  She does not want any help with alcohol use/abuse.  Family also reports that she does not drink regularly.  All questions were answered at bedside.    This document is intended for medical expert use only. Reading of this document by patients and/or patient's family without participating medical staff guidance may result in misinterpretation and unintended morbidity.  Any interpretation of such data is the responsibility of the patient and/or family member responsible for the patient in concert with their primary or specialist providers, not to be left for sources of online searches such as [employer], [employer] or similar queries. Relying on these approaches to knowledge may result in misinterpretation, misguided goals of care and even death should patients or family members try recommendations outside of the realm of professional medical care in a supervised inpatient environment.       Problems Addressed:  Alcoholic intoxication without complication: complicated acute illness or injury    Amount and/or Complexity of Data Reviewed  Labs: ordered. Decision-making details documented in ED Course.    Risk  Prescription drug management.        Final diagnoses:   Alcoholic intoxication without complication       ED Disposition  ED Disposition        ED Disposition   Discharge    Condition   Stable    Comment   --               Jade Whitlock, APRN  [address]  [place] [address] [phone]    Schedule an appointment as soon as possible for a visit in 3 days      [facility] EMERGENCY DEPARTMENT  [address] [address] [phone]  Go to   If symptoms worsen         Medication List        Changed      busPIRone 10 MG tablet  Commonly known as: BUSPAR  Take 1 tablet by mouth 2 (Two) Times a Day.  What changed:   when to take this  additional instructions     metoprolol tartrate 50 MG tablet  Commonly known as: LOPRESSOR  Take 1 tablet by mouth 2 (Two) Times a Day.  What changed: when to take this                 Vicenta Crowley PA  04/18/24 0884

## 2024-04-18 NOTE — Clinical Note
[facility] EMERGENCY DEPARTMENT  [address] [place] [address]  Phone: [phone]    Brandi Ash was seen and treated in our emergency department on 4/18/2024.  She may return to work on 04/19/2024.         Thank you for choosing [facility].    Vicenta Crowley PA

## 2024-04-18 NOTE — DISCHARGE INSTRUCTIONS
Drink plenty of clear fluids and get plenty of rest.    Follow-up with your primary care provider in 3-5 days.  If you do not have a primary care provider call [phone] for help in finding one, or you may follow up with [facility] at [phone].    Return to ED for any new or worsening symptoms

## 2024-04-22 LAB — METHYLMALONATE SERPL-SCNC: 152 NMOL/L (ref 0–378)

## 2024-05-13 ENCOUNTER — TELEPHONE (OUTPATIENT)
Dept: PSYCHIATRY | Facility: CLINIC | Age: 29
End: 2024-05-13
Payer: COMMERCIAL

## 2024-05-13 NOTE — TELEPHONE ENCOUNTER
Pt says she had to reschedule her appt with you, but it is not until July, so she was wondering if she could have a sooner appt, because she has been unmedicated for the last few months, but her anxiety is really high right now.  She says she is having panic attacks, and her BP at the doctor's the other day was 150/110.  She would like refills on her buspar and Ativan.  Please advise.

## 2024-05-14 ENCOUNTER — OFFICE VISIT (OUTPATIENT)
Dept: FAMILY MEDICINE CLINIC | Facility: CLINIC | Age: 29
End: 2024-05-14
Payer: COMMERCIAL

## 2024-05-14 VITALS
RESPIRATION RATE: 16 BRPM | HEIGHT: 68 IN | TEMPERATURE: 98 F | WEIGHT: 257 LBS | HEART RATE: 112 BPM | BODY MASS INDEX: 38.95 KG/M2 | OXYGEN SATURATION: 100 %

## 2024-05-14 DIAGNOSIS — U07.0 VAPING-RELATED DISORDER: Chronic | ICD-10-CM

## 2024-05-14 DIAGNOSIS — I15.9 SECONDARY HYPERTENSION: Primary | ICD-10-CM

## 2024-05-14 PROCEDURE — 99214 OFFICE O/P EST MOD 30 MIN: CPT | Performed by: NURSE PRACTITIONER

## 2024-05-14 RX ORDER — METOPROLOL SUCCINATE 50 MG/1
50 TABLET, EXTENDED RELEASE ORAL DAILY
Qty: 30 TABLET | Refills: 0 | Status: SHIPPED | OUTPATIENT
Start: 2024-05-14

## 2024-05-14 RX ORDER — MULTIPLE VITAMINS W/ MINERALS TAB 9MG-400MCG
1 TAB ORAL DAILY
COMMUNITY

## 2024-05-14 NOTE — PROGRESS NOTES
Subjective        Brandi Ash is a 28 y.o. female.     Chief Complaint   Patient presents with    Hypertension     Has severe health anxiety - had to go to  and BP was 148/110.  Also having pressure behind eyes.    Med Refill     Was taking Metoprolol and quit taking because BP was getting too low       Hypertension      Patient is here for concerns for hypertension. She is having increased anxiety. She freaks out about blood pressure. She stopped all meds after bariatric surgery.     Hypertension: she stopped her medications after bariatric surgery 9/5/2023.she is having pressure behind her eyes and headaches.   She no longer checks.   She was on metoprolol 50 mg bid. She has lost over 100 lbs in past 8 months.   Mother has lung cancer, she has       The following portions of the patient's history were reviewed and updated as appropriate: allergies, current medications, past family history, past medical history, past social history, past surgical history and problem list.      Current Outpatient Medications:     docusate sodium (COLACE) 100 MG capsule, Take 1 capsule by mouth 2 (Two) Times a Day., Disp: 60 capsule, Rfl: 0    metoprolol succinate XL (Toprol XL) 50 MG 24 hr tablet, Take 1 tablet by mouth Daily., Disp: 30 tablet, Rfl: 0    multivitamin with minerals tablet tablet, Take 1 tablet by mouth Daily., Disp: , Rfl:     Recent Results (from the past 4032 hour(s))   Comprehensive Metabolic Panel    Collection Time: 01/05/24  3:55 AM    Specimen: Blood   Result Value Ref Range    Glucose 91 65 - 99 mg/dL    BUN 14 6 - 20 mg/dL    Creatinine 0.54 (L) 0.57 - 1.00 mg/dL    Sodium 140 136 - 145 mmol/L    Potassium 4.0 3.5 - 5.2 mmol/L    Chloride 102 98 - 107 mmol/L    CO2 27.0 22.0 - 29.0 mmol/L    Calcium 9.4 8.6 - 10.5 mg/dL    Total Protein 7.3 6.0 - 8.5 g/dL    Albumin 4.2 3.5 - 5.2 g/dL    ALT (SGPT) 33 1 - 33 U/L    AST (SGOT) 35 (H) 1 - 32 U/L    Alkaline Phosphatase 95 39 - 117 U/L    Total  Bilirubin 0.2 0.0 - 1.2 mg/dL    Globulin 3.1 gm/dL    A/G Ratio 1.4 g/dL    BUN/Creatinine Ratio 25.9 (H) 7.0 - 25.0    Anion Gap 11.0 5.0 - 15.0 mmol/L    eGFR 128.0 >60.0 mL/min/1.73   Lipase    Collection Time: 01/05/24  3:55 AM    Specimen: Blood   Result Value Ref Range    Lipase 26 13 - 60 U/L   Magnesium    Collection Time: 01/05/24  3:55 AM    Specimen: Blood   Result Value Ref Range    Magnesium 2.0 1.6 - 2.6 mg/dL   CBC Auto Differential    Collection Time: 01/05/24  3:55 AM    Specimen: Blood   Result Value Ref Range    WBC 5.60 3.40 - 10.80 10*3/mm3    RBC 4.83 3.77 - 5.28 10*6/mm3    Hemoglobin 12.5 12.0 - 15.9 g/dL    Hematocrit 38.5 34.0 - 46.6 %    MCV 79.8 79.0 - 97.0 fL    MCH 26.0 (L) 26.6 - 33.0 pg    MCHC 32.6 31.5 - 35.7 g/dL    RDW 15.0 12.3 - 15.4 %    RDW-SD 42.0 37.0 - 54.0 fl    MPV 8.8 6.0 - 12.0 fL    Platelets 280 140 - 450 10*3/mm3    Neutrophil % 32.0 (L) 42.7 - 76.0 %    Lymphocyte % 52.0 (H) 19.6 - 45.3 %    Monocyte % 13.3 (H) 5.0 - 12.0 %    Eosinophil % 1.9 0.3 - 6.2 %    Basophil % 0.8 0.0 - 1.5 %    Neutrophils, Absolute 1.80 1.70 - 7.00 10*3/mm3    Lymphocytes, Absolute 2.90 0.70 - 3.10 10*3/mm3    Monocytes, Absolute 0.70 0.10 - 0.90 10*3/mm3    Eosinophils, Absolute 0.10 0.00 - 0.40 10*3/mm3    Basophils, Absolute 0.00 0.00 - 0.20 10*3/mm3    nRBC 0.0 0.0 - 0.2 /100 WBC   Urinalysis With Microscopic If Indicated (No Culture) - Urine, Clean Catch    Collection Time: 01/05/24  3:58 AM    Specimen: Urine, Clean Catch   Result Value Ref Range    Color, UA Yellow Yellow, Straw    Appearance, UA Turbid (A) Clear    pH, UA 6.5 5.0 - 8.0    Specific Gravity, UA 1.020 1.005 - 1.030    Glucose, UA Negative Negative    Ketones, UA Trace (A) Negative    Bilirubin, UA Negative Negative    Blood, UA Negative Negative    Protein, UA Trace (A) Negative    Leuk Esterase, UA Large (3+) (A) Negative    Nitrite, UA Negative Negative    Urobilinogen, UA 1.0 E.U./dL 0.2 - 1.0 E.U./dL    Pregnancy, Urine - Urine, Clean Catch    Collection Time: 01/05/24  3:58 AM    Specimen: Urine, Clean Catch   Result Value Ref Range    HCG, Urine QL Negative Negative   Urinalysis, Microscopic Only - Urine, Clean Catch    Collection Time: 01/05/24  3:58 AM    Specimen: Urine, Clean Catch   Result Value Ref Range    RBC, UA 3-5 (A) None Seen, 0-2 /HPF    WBC, UA Too Numerous to Count (A) None Seen, 0-2 /HPF    Bacteria, UA 4+ (A) None Seen /HPF    Squamous Epithelial Cells, UA 21-30 (A) None Seen, 0-2 /HPF    Hyaline Casts, UA 0-2 None Seen /LPF    Methodology Automated Microscopy    Urine Culture - Urine, Urine, Clean Catch    Collection Time: 01/05/24  3:58 AM    Specimen: Urine, Clean Catch   Result Value Ref Range    Urine Culture 25,000 CFU/mL Normal Urogenital Gale    Methylmalonic Acid, Serum    Collection Time: 04/16/24 12:47 PM    Specimen: Blood   Result Value Ref Range    Methylmalonic Acid 152 0 - 378 nmol/L   Phosphorus    Collection Time: 04/16/24 12:47 PM    Specimen: Blood   Result Value Ref Range    Phosphorus 3.4 2.5 - 4.5 mg/dL   Magnesium    Collection Time: 04/16/24 12:47 PM    Specimen: Blood   Result Value Ref Range    Magnesium 2.1 1.6 - 2.6 mg/dL   Comprehensive Metabolic Panel    Collection Time: 04/16/24 12:47 PM    Specimen: Blood   Result Value Ref Range    Glucose 87 65 - 99 mg/dL    BUN 12 6 - 20 mg/dL    Creatinine 0.74 0.57 - 1.00 mg/dL    Sodium 136 136 - 145 mmol/L    Potassium 4.2 3.5 - 5.2 mmol/L    Chloride 100 98 - 107 mmol/L    CO2 26.5 22.0 - 29.0 mmol/L    Calcium 9.8 8.6 - 10.5 mg/dL    Total Protein 7.0 6.0 - 8.5 g/dL    Albumin 4.1 3.5 - 5.2 g/dL    ALT (SGPT) 21 1 - 33 U/L    AST (SGOT) 13 1 - 32 U/L    Alkaline Phosphatase 91 39 - 117 U/L    Total Bilirubin 0.4 0.0 - 1.2 mg/dL    Globulin 2.9 gm/dL    A/G Ratio 1.4 g/dL    BUN/Creatinine Ratio 16.2 7.0 - 25.0    Anion Gap 9.5 5.0 - 15.0 mmol/L    eGFR 112.5 >60.0 mL/min/1.73   Vitamin D,25-Hydroxy    Collection Time:  04/16/24 12:47 PM    Specimen: Blood   Result Value Ref Range    25 Hydroxy, Vitamin D 39.6 30.0 - 100.0 ng/ml   Iron Profile    Collection Time: 04/16/24 12:47 PM    Specimen: Blood   Result Value Ref Range    Iron 59 37 - 145 mcg/dL    Iron Saturation (TSAT) 14 (L) 20 - 50 %    Transferrin 279 200 - 360 mg/dL    TIBC 416 298 - 536 mcg/dL   CBC Auto Differential    Collection Time: 04/16/24 12:47 PM    Specimen: Blood   Result Value Ref Range    WBC 7.93 3.40 - 10.80 10*3/mm3    RBC 4.69 3.77 - 5.28 10*6/mm3    Hemoglobin 12.3 12.0 - 15.9 g/dL    Hematocrit 38.7 34.0 - 46.6 %    MCV 82.5 79.0 - 97.0 fL    MCH 26.2 (L) 26.6 - 33.0 pg    MCHC 31.8 31.5 - 35.7 g/dL    RDW 13.4 12.3 - 15.4 %    RDW-SD 39.8 37.0 - 54.0 fl    MPV 12.0 6.0 - 12.0 fL    Platelets 275 140 - 450 10*3/mm3    Neutrophil % 55.1 42.7 - 76.0 %    Lymphocyte % 37.6 19.6 - 45.3 %    Monocyte % 5.8 5.0 - 12.0 %    Eosinophil % 0.9 0.3 - 6.2 %    Basophil % 0.5 0.0 - 1.5 %    Immature Grans % 0.1 0.0 - 0.5 %    Neutrophils, Absolute 4.37 1.70 - 7.00 10*3/mm3    Lymphocytes, Absolute 2.98 0.70 - 3.10 10*3/mm3    Monocytes, Absolute 0.46 0.10 - 0.90 10*3/mm3    Eosinophils, Absolute 0.07 0.00 - 0.40 10*3/mm3    Basophils, Absolute 0.04 0.00 - 0.20 10*3/mm3    Immature Grans, Absolute 0.01 0.00 - 0.05 10*3/mm3    nRBC 0.0 0.0 - 0.2 /100 WBC   POC Glucose Once    Collection Time: 04/18/24  7:01 AM    Specimen: Blood   Result Value Ref Range    Glucose 106 (H) 70 - 105 mg/dL   Urine Drug Screen - Urine, Clean Catch    Collection Time: 04/18/24  7:05 AM    Specimen: Urine, Clean Catch   Result Value Ref Range    Amphet/Methamphet, Screen Negative Negative    Barbiturates Screen, Urine Negative Negative    Benzodiazepine Screen, Urine Negative Negative    Cocaine Screen, Urine Negative Negative    Opiate Screen Negative Negative    THC, Screen, Urine Negative Negative    Methadone Screen, Urine Negative Negative    Oxycodone Screen, Urine Negative Negative    Pregnancy, Urine - Urine, Clean Catch    Collection Time: 04/18/24  7:05 AM    Specimen: Urine, Clean Catch   Result Value Ref Range    HCG, Urine QL Negative Negative   Urinalysis With Microscopic If Indicated (No Culture) - Urine, Clean Catch    Collection Time: 04/18/24  7:05 AM    Specimen: Urine, Clean Catch   Result Value Ref Range    Color, UA Yellow Yellow, Straw    Appearance, UA Clear Clear    pH, UA 8.0 5.0 - 8.0    Specific Gravity, UA 1.006 1.005 - 1.030    Glucose, UA Negative Negative    Ketones, UA Negative Negative    Bilirubin, UA Negative Negative    Blood, UA Negative Negative    Protein, UA Negative Negative    Leuk Esterase, UA Negative Negative    Nitrite, UA Negative Negative    Urobilinogen, UA 0.2 E.U./dL 0.2 - 1.0 E.U./dL   Comprehensive Metabolic Panel    Collection Time: 04/18/24  7:15 AM    Specimen: Blood   Result Value Ref Range    Glucose 107 (H) 65 - 99 mg/dL    BUN 11 6 - 20 mg/dL    Creatinine 0.67 0.57 - 1.00 mg/dL    Sodium 143 136 - 145 mmol/L    Potassium 4.1 3.5 - 5.2 mmol/L    Chloride 105 98 - 107 mmol/L    CO2 24.0 22.0 - 29.0 mmol/L    Calcium 9.2 8.6 - 10.5 mg/dL    Total Protein 7.8 6.0 - 8.5 g/dL    Albumin 4.2 3.5 - 5.2 g/dL    ALT (SGPT) 19 1 - 33 U/L    AST (SGOT) 20 1 - 32 U/L    Alkaline Phosphatase 93 39 - 117 U/L    Total Bilirubin 0.2 0.0 - 1.2 mg/dL    Globulin 3.6 gm/dL    A/G Ratio 1.2 g/dL    BUN/Creatinine Ratio 16.4 7.0 - 25.0    Anion Gap 14.0 5.0 - 15.0 mmol/L    eGFR 121.5 >60.0 mL/min/1.73   Ethanol    Collection Time: 04/18/24  7:15 AM    Specimen: Blood   Result Value Ref Range    Ethanol % 0.151 %   CBC Auto Differential    Collection Time: 04/18/24  7:15 AM    Specimen: Blood   Result Value Ref Range    WBC 9.26 3.40 - 10.80 10*3/mm3    RBC 4.87 3.77 - 5.28 10*6/mm3    Hemoglobin 12.8 12.0 - 15.9 g/dL    Hematocrit 40.4 34.0 - 46.6 %    MCV 83.0 79.0 - 97.0 fL    MCH 26.3 (L) 26.6 - 33.0 pg    MCHC 31.7 31.5 - 35.7 g/dL    RDW 13.2 12.3 - 15.4 %  "   RDW-SD 39.6 37.0 - 54.0 fl    MPV 11.3 6.0 - 12.0 fL    Platelets 296 140 - 450 10*3/mm3    Neutrophil % 56.7 42.7 - 76.0 %    Lymphocyte % 37.1 19.6 - 45.3 %    Monocyte % 5.1 5.0 - 12.0 %    Eosinophil % 0.4 0.3 - 6.2 %    Basophil % 0.5 0.0 - 1.5 %    Immature Grans % 0.2 0.0 - 0.5 %    Neutrophils, Absolute 5.24 1.70 - 7.00 10*3/mm3    Lymphocytes, Absolute 3.44 (H) 0.70 - 3.10 10*3/mm3    Monocytes, Absolute 0.47 0.10 - 0.90 10*3/mm3    Eosinophils, Absolute 0.04 0.00 - 0.40 10*3/mm3    Basophils, Absolute 0.05 0.00 - 0.20 10*3/mm3    Immature Grans, Absolute 0.02 0.00 - 0.05 10*3/mm3    nRBC 0.0 0.0 - 0.2 /100 WBC         Review of Systems    Objective     Pulse 112   Temp 98 °F (36.7 °C) (Oral)   Resp 16   Ht 172.7 cm (68\")   Wt 117 kg (257 lb)   SpO2 100%   BMI 39.08 kg/m²     Physical Exam  Vitals reviewed.   Constitutional:       Appearance: She is obese.   HENT:      Head: Normocephalic.      Right Ear: Tympanic membrane normal.      Left Ear: Tympanic membrane normal.      Nose: Nose normal.      Mouth/Throat:      Mouth: Mucous membranes are moist.   Eyes:      Pupils: Pupils are equal, round, and reactive to light.   Cardiovascular:      Rate and Rhythm: Normal rate and regular rhythm.      Pulses: Normal pulses.      Heart sounds: Normal heart sounds.   Pulmonary:      Effort: Pulmonary effort is normal.      Breath sounds: Normal breath sounds.   Abdominal:      Palpations: Abdomen is soft.   Neurological:      General: No focal deficit present.      Mental Status: She is alert and oriented to person, place, and time.   Psychiatric:         Mood and Affect: Mood is anxious.         Thought Content: Thought content normal.         Result Review :                Assessment & Plan    Diagnoses and all orders for this visit:    1. Secondary hypertension (Primary)  Comments:  start the metoprolol succinate XL 50mg daily    2. Vaping-related disorder  Comments:  discussed cessation.  Assessment & " Plan:  Mother has lung cancer.  Stressed smoking cessation.      Other orders  -     metoprolol succinate XL (Toprol XL) 50 MG 24 hr tablet; Take 1 tablet by mouth Daily.  Dispense: 30 tablet; Refill: 0      There are no Patient Instructions on file for this visit.    Follow Up   No follow-ups on file.    Patient was given instructions and counseling regarding her condition or for health maintenance advice. Please see specific information pulled into the AVS if appropriate.     Jade Whitlock, APRN    05/14/24

## 2024-05-15 ENCOUNTER — OFFICE VISIT (OUTPATIENT)
Dept: PSYCHIATRY | Facility: CLINIC | Age: 29
End: 2024-05-15
Payer: COMMERCIAL

## 2024-05-15 DIAGNOSIS — F43.10 POSTTRAUMATIC STRESS DISORDER: Primary | ICD-10-CM

## 2024-05-15 DIAGNOSIS — F90.9 ATTENTION DEFICIT HYPERACTIVITY DISORDER (ADHD), UNSPECIFIED ADHD TYPE: ICD-10-CM

## 2024-05-15 DIAGNOSIS — F40.01 PANIC DISORDER WITH AGORAPHOBIA: ICD-10-CM

## 2024-05-15 RX ORDER — LORAZEPAM 0.5 MG/1
0.5 TABLET ORAL 2 TIMES DAILY PRN
Qty: 60 TABLET | Refills: 1 | Status: SHIPPED | OUTPATIENT
Start: 2024-05-15

## 2024-05-15 RX ORDER — BUSPIRONE HYDROCHLORIDE 5 MG/1
5 TABLET ORAL 3 TIMES DAILY
Qty: 90 TABLET | Refills: 3 | Status: SHIPPED | OUTPATIENT
Start: 2024-05-15
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
## 2024-06-10 RX ORDER — METOPROLOL SUCCINATE 50 MG/1
50 TABLET, EXTENDED RELEASE ORAL DAILY
Qty: 30 TABLET | Refills: 0 | Status: SHIPPED | OUTPATIENT
Start: 2024-06-10

## 2024-07-11 ENCOUNTER — OFFICE VISIT (OUTPATIENT)
Dept: FAMILY MEDICINE CLINIC | Facility: CLINIC | Age: 29
End: 2024-07-11

## 2024-07-11 VITALS
HEIGHT: 68 IN | OXYGEN SATURATION: 98 % | WEIGHT: 243 LBS | HEART RATE: 106 BPM | SYSTOLIC BLOOD PRESSURE: 110 MMHG | DIASTOLIC BLOOD PRESSURE: 80 MMHG | BODY MASS INDEX: 36.83 KG/M2 | TEMPERATURE: 97.8 F | RESPIRATION RATE: 17 BRPM

## 2024-07-11 DIAGNOSIS — F40.01 PANIC DISORDER WITH AGORAPHOBIA: ICD-10-CM

## 2024-07-11 DIAGNOSIS — R21 RASH AND NONSPECIFIC SKIN ERUPTION: Chronic | ICD-10-CM

## 2024-07-11 DIAGNOSIS — B35.4 TINEA CORPORIS: Primary | ICD-10-CM

## 2024-07-11 DIAGNOSIS — I15.9 SECONDARY HYPERTENSION: Chronic | ICD-10-CM

## 2024-07-11 DIAGNOSIS — F43.10 POSTTRAUMATIC STRESS DISORDER: ICD-10-CM

## 2024-07-11 RX ORDER — CLOTRIMAZOLE AND BETAMETHASONE DIPROPIONATE 10; .64 MG/G; MG/G
1 CREAM TOPICAL 2 TIMES DAILY
Qty: 45 G | Refills: 1 | Status: SHIPPED | OUTPATIENT
Start: 2024-07-11

## 2024-07-11 NOTE — PATIENT INSTRUCTIONS
Take pictures of rash   You should hear from dermatology to schedule appointment  Start the medication  Monitor blood pressure.

## 2024-07-11 NOTE — PROGRESS NOTES
Subjective        Brandi Ash is a 29 y.o. female.     Chief Complaint   Patient presents with    Hypertension     Last BP reading at home 133 / 83    Rash     On hand and elbow x few weeks.    On arm x 1 week  Under breast x few days        Hypertension    Rash      Patient is here for management of her chronic medical problems: hypertension, new problem rash on hand right elbow .     Rash on body. Hand right elbow, and in between breasts . Started 2 weeks ago . Reports they are itching a lot.   Not used anything on this .   Thinks she had this in past.     Hypertension: taking metoprolol 50 mg daily.   She checks is never high at home.   No chest pain but every once and while she will stand up and feels like she will pass out. Not often but had gastric bypass and lost a lot of weight.       The following portions of the patient's history were reviewed and updated as appropriate: allergies, current medications, past family history, past medical history, past social history, past surgical history and problem list.      Current Outpatient Medications:     busPIRone (BUSPAR) 5 MG tablet, Take 1 tablet by mouth 3 (Three) Times a Day., Disp: 90 tablet, Rfl: 3    docusate sodium (COLACE) 100 MG capsule, Take 1 capsule by mouth 2 (Two) Times a Day., Disp: 60 capsule, Rfl: 0    LORazepam (Ativan) 0.5 MG tablet, Take 1 tablet by mouth 2 (Two) Times a Day As Needed for Anxiety., Disp: 60 tablet, Rfl: 1    metoprolol succinate XL (TOPROL-XL) 50 MG 24 hr tablet, Take 1 tablet by mouth once daily, Disp: 30 tablet, Rfl: 0    multivitamin with minerals tablet tablet, Take 1 tablet by mouth Daily., Disp: , Rfl:     Recent Results (from the past 4032 hour(s))   Methylmalonic Acid, Serum    Collection Time: 04/16/24 12:47 PM    Specimen: Blood   Result Value Ref Range    Methylmalonic Acid 152 0 - 378 nmol/L   Phosphorus    Collection Time: 04/16/24 12:47 PM    Specimen: Blood   Result Value Ref Range    Phosphorus 3.4 2.5 -  4.5 mg/dL   Magnesium    Collection Time: 04/16/24 12:47 PM    Specimen: Blood   Result Value Ref Range    Magnesium 2.1 1.6 - 2.6 mg/dL   Comprehensive Metabolic Panel    Collection Time: 04/16/24 12:47 PM    Specimen: Blood   Result Value Ref Range    Glucose 87 65 - 99 mg/dL    BUN 12 6 - 20 mg/dL    Creatinine 0.74 0.57 - 1.00 mg/dL    Sodium 136 136 - 145 mmol/L    Potassium 4.2 3.5 - 5.2 mmol/L    Chloride 100 98 - 107 mmol/L    CO2 26.5 22.0 - 29.0 mmol/L    Calcium 9.8 8.6 - 10.5 mg/dL    Total Protein 7.0 6.0 - 8.5 g/dL    Albumin 4.1 3.5 - 5.2 g/dL    ALT (SGPT) 21 1 - 33 U/L    AST (SGOT) 13 1 - 32 U/L    Alkaline Phosphatase 91 39 - 117 U/L    Total Bilirubin 0.4 0.0 - 1.2 mg/dL    Globulin 2.9 gm/dL    A/G Ratio 1.4 g/dL    BUN/Creatinine Ratio 16.2 7.0 - 25.0    Anion Gap 9.5 5.0 - 15.0 mmol/L    eGFR 112.5 >60.0 mL/min/1.73   Vitamin D,25-Hydroxy    Collection Time: 04/16/24 12:47 PM    Specimen: Blood   Result Value Ref Range    25 Hydroxy, Vitamin D 39.6 30.0 - 100.0 ng/ml   Iron Profile    Collection Time: 04/16/24 12:47 PM    Specimen: Blood   Result Value Ref Range    Iron 59 37 - 145 mcg/dL    Iron Saturation (TSAT) 14 (L) 20 - 50 %    Transferrin 279 200 - 360 mg/dL    TIBC 416 298 - 536 mcg/dL   CBC Auto Differential    Collection Time: 04/16/24 12:47 PM    Specimen: Blood   Result Value Ref Range    WBC 7.93 3.40 - 10.80 10*3/mm3    RBC 4.69 3.77 - 5.28 10*6/mm3    Hemoglobin 12.3 12.0 - 15.9 g/dL    Hematocrit 38.7 34.0 - 46.6 %    MCV 82.5 79.0 - 97.0 fL    MCH 26.2 (L) 26.6 - 33.0 pg    MCHC 31.8 31.5 - 35.7 g/dL    RDW 13.4 12.3 - 15.4 %    RDW-SD 39.8 37.0 - 54.0 fl    MPV 12.0 6.0 - 12.0 fL    Platelets 275 140 - 450 10*3/mm3    Neutrophil % 55.1 42.7 - 76.0 %    Lymphocyte % 37.6 19.6 - 45.3 %    Monocyte % 5.8 5.0 - 12.0 %    Eosinophil % 0.9 0.3 - 6.2 %    Basophil % 0.5 0.0 - 1.5 %    Immature Grans % 0.1 0.0 - 0.5 %    Neutrophils, Absolute 4.37 1.70 - 7.00 10*3/mm3    Lymphocytes,  Absolute 2.98 0.70 - 3.10 10*3/mm3    Monocytes, Absolute 0.46 0.10 - 0.90 10*3/mm3    Eosinophils, Absolute 0.07 0.00 - 0.40 10*3/mm3    Basophils, Absolute 0.04 0.00 - 0.20 10*3/mm3    Immature Grans, Absolute 0.01 0.00 - 0.05 10*3/mm3    nRBC 0.0 0.0 - 0.2 /100 WBC   POC Glucose Once    Collection Time: 04/18/24  7:01 AM    Specimen: Blood   Result Value Ref Range    Glucose 106 (H) 70 - 105 mg/dL   Urine Drug Screen - Urine, Clean Catch    Collection Time: 04/18/24  7:05 AM    Specimen: Urine, Clean Catch   Result Value Ref Range    Amphet/Methamphet, Screen Negative Negative    Barbiturates Screen, Urine Negative Negative    Benzodiazepine Screen, Urine Negative Negative    Cocaine Screen, Urine Negative Negative    Opiate Screen Negative Negative    THC, Screen, Urine Negative Negative    Methadone Screen, Urine Negative Negative    Oxycodone Screen, Urine Negative Negative   Pregnancy, Urine - Urine, Clean Catch    Collection Time: 04/18/24  7:05 AM    Specimen: Urine, Clean Catch   Result Value Ref Range    HCG, Urine QL Negative Negative   Urinalysis With Microscopic If Indicated (No Culture) - Urine, Clean Catch    Collection Time: 04/18/24  7:05 AM    Specimen: Urine, Clean Catch   Result Value Ref Range    Color, UA Yellow Yellow, Straw    Appearance, UA Clear Clear    pH, UA 8.0 5.0 - 8.0    Specific Gravity, UA 1.006 1.005 - 1.030    Glucose, UA Negative Negative    Ketones, UA Negative Negative    Bilirubin, UA Negative Negative    Blood, UA Negative Negative    Protein, UA Negative Negative    Leuk Esterase, UA Negative Negative    Nitrite, UA Negative Negative    Urobilinogen, UA 0.2 E.U./dL 0.2 - 1.0 E.U./dL   Comprehensive Metabolic Panel    Collection Time: 04/18/24  7:15 AM    Specimen: Blood   Result Value Ref Range    Glucose 107 (H) 65 - 99 mg/dL    BUN 11 6 - 20 mg/dL    Creatinine 0.67 0.57 - 1.00 mg/dL    Sodium 143 136 - 145 mmol/L    Potassium 4.1 3.5 - 5.2 mmol/L    Chloride 105 98 -  "107 mmol/L    CO2 24.0 22.0 - 29.0 mmol/L    Calcium 9.2 8.6 - 10.5 mg/dL    Total Protein 7.8 6.0 - 8.5 g/dL    Albumin 4.2 3.5 - 5.2 g/dL    ALT (SGPT) 19 1 - 33 U/L    AST (SGOT) 20 1 - 32 U/L    Alkaline Phosphatase 93 39 - 117 U/L    Total Bilirubin 0.2 0.0 - 1.2 mg/dL    Globulin 3.6 gm/dL    A/G Ratio 1.2 g/dL    BUN/Creatinine Ratio 16.4 7.0 - 25.0    Anion Gap 14.0 5.0 - 15.0 mmol/L    eGFR 121.5 >60.0 mL/min/1.73   Ethanol    Collection Time: 04/18/24  7:15 AM    Specimen: Blood   Result Value Ref Range    Ethanol % 0.151 %   CBC Auto Differential    Collection Time: 04/18/24  7:15 AM    Specimen: Blood   Result Value Ref Range    WBC 9.26 3.40 - 10.80 10*3/mm3    RBC 4.87 3.77 - 5.28 10*6/mm3    Hemoglobin 12.8 12.0 - 15.9 g/dL    Hematocrit 40.4 34.0 - 46.6 %    MCV 83.0 79.0 - 97.0 fL    MCH 26.3 (L) 26.6 - 33.0 pg    MCHC 31.7 31.5 - 35.7 g/dL    RDW 13.2 12.3 - 15.4 %    RDW-SD 39.6 37.0 - 54.0 fl    MPV 11.3 6.0 - 12.0 fL    Platelets 296 140 - 450 10*3/mm3    Neutrophil % 56.7 42.7 - 76.0 %    Lymphocyte % 37.1 19.6 - 45.3 %    Monocyte % 5.1 5.0 - 12.0 %    Eosinophil % 0.4 0.3 - 6.2 %    Basophil % 0.5 0.0 - 1.5 %    Immature Grans % 0.2 0.0 - 0.5 %    Neutrophils, Absolute 5.24 1.70 - 7.00 10*3/mm3    Lymphocytes, Absolute 3.44 (H) 0.70 - 3.10 10*3/mm3    Monocytes, Absolute 0.47 0.10 - 0.90 10*3/mm3    Eosinophils, Absolute 0.04 0.00 - 0.40 10*3/mm3    Basophils, Absolute 0.05 0.00 - 0.20 10*3/mm3    Immature Grans, Absolute 0.02 0.00 - 0.05 10*3/mm3    nRBC 0.0 0.0 - 0.2 /100 WBC         Review of Systems   Skin:  Positive for rash.       Objective     /80   Pulse 106   Temp 97.8 °F (36.6 °C) (Oral)   Resp 17   Ht 172.7 cm (68\")   Wt 110 kg (243 lb)   SpO2 98%   BMI 36.95 kg/m²     Physical Exam  Vitals and nursing note reviewed.   Constitutional:       Appearance: Normal appearance.   HENT:      Head: Normocephalic.      Right Ear: Tympanic membrane normal.      Left Ear: Tympanic " membrane normal.      Mouth/Throat:      Mouth: Mucous membranes are moist.   Cardiovascular:      Rate and Rhythm: Normal rate and regular rhythm.      Pulses: Normal pulses.      Heart sounds: Normal heart sounds.   Pulmonary:      Effort: Pulmonary effort is normal.      Breath sounds: Normal breath sounds.   Abdominal:      Palpations: Abdomen is soft.   Skin:     General: Skin is warm.      Findings: Rash present.             Comments: Palpable circular rash .   Elbow and hand dorsum      Neurological:      General: No focal deficit present.      Mental Status: She is alert and oriented to person, place, and time.   Psychiatric:         Mood and Affect: Mood normal.         Thought Content: Thought content normal.         Result Review :                Assessment & Plan    Diagnoses and all orders for this visit:    1. Tinea corporis (Primary)  Comments:  referred to dermtology    2. Rash and nonspecific skin eruption  Comments:  referred to dermtology  Orders:  -     Ambulatory Referral to Dermatology    3. Secondary hypertension  Comments:  stable    Other orders  -     Discontinue: betamethasone valerate (VALISONE) 0.1 % cream; Apply 1 Application topically to the appropriate area as directed 2 (Two) Times a Day.  Dispense: 45 g; Refill: 1      Patient Instructions   Take pictures of rash   You should hear from dermatology to schedule appointment  Start the medication  Monitor blood pressure.     Follow Up   Return in about 6 months (around 1/11/2025).    Patient was given instructions and counseling regarding her condition or for health maintenance advice. Please see specific information pulled into the AVS if appropriate.     Jade Whitlock, APRN    07/11/24

## 2024-07-12 RX ORDER — LORAZEPAM 0.5 MG/1
0.5 TABLET ORAL 2 TIMES DAILY PRN
Qty: 60 TABLET | Refills: 0 | Status: SHIPPED | OUTPATIENT
Start: 2024-07-12

## 2024-07-12 NOTE — TELEPHONE ENCOUNTER
Rx Refill Note  Requested Prescriptions     Pending Prescriptions Disp Refills    LORazepam (ATIVAN) 0.5 MG tablet [Pharmacy Med Name: LORazepam 0.5 MG Oral Tablet] 60 tablet 0     Sig: Take 1 tablet by mouth twice daily as needed for anxiety      Last office visit with prescribing clinician: 5/15/2024   Last telemedicine visit with prescribing clinician: Visit date not found   Next office visit with prescribing clinician: 8/8/2024   Office Visit with Sheridan Adams MD (05/15/2024)   No UDS on file from this office   Urine Drug Screen - Urine, Clean Catch (08/09/2023 10:26)                     Would you like a call back once the refill request has been completed: [] Yes [] No    If the office needs to give you a call back, can they leave a voicemail: [] Yes [] No  BEHAVIORAL HEALTH - SCAN - Inspect Brandi Ash 7/12/24 (07/12/2024)     Sold on 6/14/24  Charlene Virk  07/12/24, 10:16 EDT

## 2024-07-15 RX ORDER — METOPROLOL SUCCINATE 50 MG/1
50 TABLET, EXTENDED RELEASE ORAL DAILY
Qty: 30 TABLET | Refills: 0 | Status: SHIPPED | OUTPATIENT
Start: 2024-07-15 | End: 2024-07-15 | Stop reason: SDUPTHER

## 2024-07-15 RX ORDER — METOPROLOL SUCCINATE 50 MG/1
50 TABLET, EXTENDED RELEASE ORAL DAILY
Qty: 30 TABLET | Refills: 6 | Status: SHIPPED | OUTPATIENT
Start: 2024-07-15

## 2024-08-08 ENCOUNTER — OFFICE VISIT (OUTPATIENT)
Dept: PSYCHIATRY | Facility: CLINIC | Age: 29
End: 2024-08-08

## 2024-08-08 DIAGNOSIS — F41.1 GENERALIZED ANXIETY DISORDER: Chronic | ICD-10-CM

## 2024-08-08 DIAGNOSIS — F43.10 POSTTRAUMATIC STRESS DISORDER: Primary | ICD-10-CM

## 2024-08-08 DIAGNOSIS — F40.01 PANIC DISORDER WITH AGORAPHOBIA: ICD-10-CM

## 2024-08-08 DIAGNOSIS — F33.1 MAJOR DEPRESSIVE DISORDER, RECURRENT EPISODE, MODERATE: ICD-10-CM

## 2024-08-08 RX ORDER — BUSPIRONE HYDROCHLORIDE 10 MG/1
10 TABLET ORAL 3 TIMES DAILY
Qty: 90 TABLET | Refills: 2 | Status: SHIPPED | OUTPATIENT
Start: 2024-08-08

## 2024-08-08 RX ORDER — CLONAZEPAM 0.5 MG/1
0.5 TABLET ORAL 2 TIMES DAILY PRN
Qty: 60 TABLET | Refills: 0 | Status: SHIPPED | OUTPATIENT
Start: 2024-08-08
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
## 2024-08-21 ENCOUNTER — TELEPHONE (OUTPATIENT)
Dept: PSYCHIATRY | Facility: CLINIC | Age: 29
End: 2024-08-21

## 2024-08-21 DIAGNOSIS — F40.01 PANIC DISORDER WITH AGORAPHOBIA: ICD-10-CM

## 2024-08-21 NOTE — TELEPHONE ENCOUNTER
Pt says she would like to taper off of buspar, because it is making her anxiety worse, but she read good things about Celexa, and would like to try it.

## 2024-08-22 RX ORDER — BUSPIRONE HYDROCHLORIDE 5 MG/1
5 TABLET ORAL 3 TIMES DAILY
Qty: 90 TABLET | Refills: 0 | Status: SHIPPED | OUTPATIENT
Start: 2024-08-22 | End: 2024-08-22 | Stop reason: SDUPTHER

## 2024-08-22 RX ORDER — CITALOPRAM 20 MG/1
20 TABLET ORAL DAILY
Qty: 30 TABLET | Refills: 2 | Status: SHIPPED | OUTPATIENT
Start: 2024-08-22 | End: 2025-08-22

## 2024-08-22 RX ORDER — BUSPIRONE HYDROCHLORIDE 5 MG/1
5 TABLET ORAL 3 TIMES DAILY
Qty: 90 TABLET | Refills: 0 | Status: SHIPPED | OUTPATIENT
Start: 2024-08-22

## 2024-08-22 NOTE — TELEPHONE ENCOUNTER
Pt says she never ended up taking the Trintellix samples.  Does she call back for the Celexa script after weaning off Buspar?  Or when does she start it?

## 2024-08-22 NOTE — TELEPHONE ENCOUNTER
Decrease Buspirone to 5 mg po TID for 2 weeks , then decrease to 5 mg po BID for 1 week and then d/c, new rx was sent     Is she taking trintellix?

## 2024-08-28 ENCOUNTER — TELEPHONE (OUTPATIENT)
Dept: PSYCHIATRY | Facility: CLINIC | Age: 29
End: 2024-08-28

## 2024-08-28 ENCOUNTER — APPOINTMENT (OUTPATIENT)
Dept: GENERAL RADIOLOGY | Facility: HOSPITAL | Age: 29
End: 2024-08-28

## 2024-08-28 ENCOUNTER — HOSPITAL ENCOUNTER (EMERGENCY)
Facility: HOSPITAL | Age: 29
Discharge: HOME | End: 2024-08-28
Attending: EMERGENCY MEDICINE

## 2024-08-28 VITALS
RESPIRATION RATE: 18 BRPM | HEIGHT: 68 IN | DIASTOLIC BLOOD PRESSURE: 87 MMHG | SYSTOLIC BLOOD PRESSURE: 125 MMHG | BODY MASS INDEX: 36.25 KG/M2 | OXYGEN SATURATION: 98 % | TEMPERATURE: 97.6 F | WEIGHT: 239.2 LBS | HEART RATE: 64 BPM

## 2024-08-28 DIAGNOSIS — I15.9 SECONDARY HYPERTENSION: ICD-10-CM

## 2024-08-28 DIAGNOSIS — R73.01 IMPAIRED FASTING GLUCOSE: ICD-10-CM

## 2024-08-28 DIAGNOSIS — T50.905A ADVERSE EFFECT OF DRUG, INITIAL ENCOUNTER: Primary | ICD-10-CM

## 2024-08-28 LAB
ANION GAP SERPL CALCULATED.3IONS-SCNC: 10.7 MMOL/L (ref 5–15)
BASOPHILS # BLD AUTO: 0.03 10*3/MM3 (ref 0–0.2)
BASOPHILS NFR BLD AUTO: 0.4 % (ref 0–1.5)
BUN SERPL-MCNC: 12 MG/DL (ref 6–20)
BUN/CREAT SERPL: 17.9 (ref 7–25)
CALCIUM SPEC-SCNC: 9.6 MG/DL (ref 8.6–10.5)
CHLORIDE SERPL-SCNC: 106 MMOL/L (ref 98–107)
CO2 SERPL-SCNC: 24.3 MMOL/L (ref 22–29)
CREAT SERPL-MCNC: 0.67 MG/DL (ref 0.57–1)
DEPRECATED RDW RBC AUTO: 41.1 FL (ref 37–54)
EGFRCR SERPLBLD CKD-EPI 2021: 120.8 ML/MIN/1.73
EOSINOPHIL # BLD AUTO: 0.05 10*3/MM3 (ref 0–0.4)
EOSINOPHIL NFR BLD AUTO: 0.7 % (ref 0.3–6.2)
ERYTHROCYTE [DISTWIDTH] IN BLOOD BY AUTOMATED COUNT: 13.2 % (ref 12.3–15.4)
GLUCOSE SERPL-MCNC: 92 MG/DL (ref 65–99)
HCT VFR BLD AUTO: 38.3 % (ref 34–46.6)
HGB BLD-MCNC: 12.1 G/DL (ref 12–15.9)
HOLD SPECIMEN: NORMAL
IMM GRANULOCYTES # BLD AUTO: 0.01 10*3/MM3 (ref 0–0.05)
IMM GRANULOCYTES NFR BLD AUTO: 0.1 % (ref 0–0.5)
LYMPHOCYTES # BLD AUTO: 2.82 10*3/MM3 (ref 0.7–3.1)
LYMPHOCYTES NFR BLD AUTO: 39.3 % (ref 19.6–45.3)
MAGNESIUM SERPL-MCNC: 2.5 MG/DL (ref 1.6–2.6)
MCH RBC QN AUTO: 26.8 PG (ref 26.6–33)
MCHC RBC AUTO-ENTMCNC: 31.6 G/DL (ref 31.5–35.7)
MCV RBC AUTO: 84.9 FL (ref 79–97)
MONOCYTES # BLD AUTO: 0.37 10*3/MM3 (ref 0.1–0.9)
MONOCYTES NFR BLD AUTO: 5.2 % (ref 5–12)
NEUTROPHILS NFR BLD AUTO: 3.9 10*3/MM3 (ref 1.7–7)
NEUTROPHILS NFR BLD AUTO: 54.3 % (ref 42.7–76)
NRBC BLD AUTO-RTO: 0 /100 WBC (ref 0–0.2)
PLATELET # BLD AUTO: 250 10*3/MM3 (ref 140–450)
PMV BLD AUTO: 11.3 FL (ref 6–12)
POTASSIUM SERPL-SCNC: 4.2 MMOL/L (ref 3.5–5.2)
RBC # BLD AUTO: 4.51 10*6/MM3 (ref 3.77–5.28)
SODIUM SERPL-SCNC: 141 MMOL/L (ref 136–145)
WBC NRBC COR # BLD AUTO: 7.18 10*3/MM3 (ref 3.4–10.8)

## 2024-08-28 PROCEDURE — 99284 EMERGENCY DEPT VISIT MOD MDM: CPT

## 2024-08-28 PROCEDURE — 85025 COMPLETE CBC W/AUTO DIFF WBC: CPT | Performed by: EMERGENCY MEDICINE

## 2024-08-28 PROCEDURE — 80061 LIPID PANEL: CPT | Performed by: NURSE PRACTITIONER

## 2024-08-28 PROCEDURE — 93005 ELECTROCARDIOGRAM TRACING: CPT | Performed by: EMERGENCY MEDICINE

## 2024-08-28 PROCEDURE — 84443 ASSAY THYROID STIM HORMONE: CPT | Performed by: NURSE PRACTITIONER

## 2024-08-28 PROCEDURE — 80048 BASIC METABOLIC PNL TOTAL CA: CPT | Performed by: EMERGENCY MEDICINE

## 2024-08-28 PROCEDURE — 83735 ASSAY OF MAGNESIUM: CPT | Performed by: EMERGENCY MEDICINE

## 2024-08-28 PROCEDURE — 71045 X-RAY EXAM CHEST 1 VIEW: CPT

## 2024-08-28 RX ORDER — SODIUM CHLORIDE 0.9 % (FLUSH) 0.9 %
10 SYRINGE (ML) INJECTION AS NEEDED
Status: DISCONTINUED | OUTPATIENT
Start: 2024-08-28 | End: 2024-08-28 | Stop reason: HOSPADM

## 2024-08-28 NOTE — ED PROVIDER NOTES
Subjective   History of Present Illness  Chief complaint: Medication reaction    29-year-old female presents with a medication reaction.  Patient states she started Celexa yesterday and she has been feeling very drowsy from it.  She states she checked her pulse ox at home and it was reading in the 70s and 80s so she started having a panic attack.  She states her heart rate has been going up and down.  She has had some shortness of breath.  She denies chest pain.  She states she has never taken Celexa before.    History provided by:  Patient      Review of Systems   Constitutional:  Positive for fatigue. Negative for fever.   HENT:  Negative for congestion.    Respiratory:  Positive for shortness of breath. Negative for cough.    Cardiovascular:  Positive for palpitations. Negative for chest pain.   Gastrointestinal:  Negative for abdominal pain and vomiting.   Neurological:  Negative for headaches.   Psychiatric/Behavioral:  The patient is nervous/anxious.        Past Medical History:   Diagnosis Date    Anxiety     DDD (degenerative disc disease), lumbar     Hypertension     Kidney stone     Panic disorder     PCOS (polycystic ovarian syndrome)     Pre-diabetes     Vitamin D deficiency        No Known Allergies    Past Surgical History:   Procedure Laterality Date     SECTION      CYSTOSCOPY W/ LASER LITHOTRIPSY      ENDOMETRIAL BIOPSY      ENDOSCOPY N/A 2023    Procedure: ESOPHAGOGASTRODUODENOSCOPY with biopsy x1 area;  Surgeon: Cari Smith MD;  Location: [facility] ENDOSCOPY;  Service: General;  Laterality: N/A;  Post- small hiatal hernia    GASTRIC SLEEVE LAPAROSCOPIC N/A 2023    Procedure: GASTRIC SLEEVE LAPAROSCOPIC WITH DAVINCI ROBOT; hiatal hernia repair;  Surgeon: Cari Smith MD;  Location: [facility] MAIN OR;  Service: Robotics - DaVinci;  Laterality: N/A;    TONSILLECTOMY         Family History   Problem Relation Age of Onset    Hypertension Mother     Obesity Mother     Sleep apnea  "Mother     Obesity Maternal Grandmother     Hypertension Maternal Grandmother     Sleep apnea Maternal Grandmother        Social History     Socioeconomic History    Marital status:    Tobacco Use    Smoking status: Former     Current packs/day: 0.00     Average packs/day: 0.5 packs/day for 10.0 years (5.0 ttl pk-yrs)     Types: Cigarettes     Start date: 2011     Quit date: 2021     Years since quitting: 3.6     Passive exposure: Past    Smokeless tobacco: Never   Vaping Use    Vaping status: Every Day    Last attempt to quit: 7/4/2023    Devices: Disposable    Passive vaping exposure: Yes   Substance and Sexual Activity    Alcohol use: Yes     Comment: social    Drug use: No    Sexual activity: Yes     Partners: Male       /97   Pulse 61   Temp 97.6 °F (36.4 °C) (Oral)   Resp 18   Ht 172.7 cm (68\")   Wt 109 kg (239 lb 3.2 oz)   LMP 08/21/2024   SpO2 97%   BMI 36.37 kg/m²       Objective   Physical Exam  Vitals and nursing note reviewed.   Constitutional:       Appearance: Normal appearance.   HENT:      Head: Normocephalic and atraumatic.      Mouth/Throat:      Mouth: Mucous membranes are moist.   Cardiovascular:      Rate and Rhythm: Normal rate and regular rhythm.      Heart sounds: Normal heart sounds.   Pulmonary:      Effort: Pulmonary effort is normal. No respiratory distress.      Breath sounds: Normal breath sounds.   Abdominal:      Palpations: Abdomen is soft.      Tenderness: There is no abdominal tenderness.   Skin:     General: Skin is warm and dry.   Neurological:      Mental Status: She is alert and oriented to person, place, and time.         Procedures           ED Course      Results for orders placed or performed during the hospital encounter of 08/28/24   Basic Metabolic Panel    Specimen: Blood   Result Value Ref Range    Glucose 92 65 - 99 mg/dL    BUN 12 6 - 20 mg/dL    Creatinine 0.67 0.57 - 1.00 mg/dL    Sodium 141 136 - 145 mmol/L    Potassium 4.2 3.5 - 5.2 mmol/L    " Chloride 106 98 - 107 mmol/L    CO2 24.3 22.0 - 29.0 mmol/L    Calcium 9.6 8.6 - 10.5 mg/dL    BUN/Creatinine Ratio 17.9 7.0 - 25.0    Anion Gap 10.7 5.0 - 15.0 mmol/L    eGFR 120.8 >60.0 mL/min/1.73   Magnesium    Specimen: Blood   Result Value Ref Range    Magnesium 2.5 1.6 - 2.6 mg/dL   CBC Auto Differential    Specimen: Blood   Result Value Ref Range    WBC 7.18 3.40 - 10.80 10*3/mm3    RBC 4.51 3.77 - 5.28 10*6/mm3    Hemoglobin 12.1 12.0 - 15.9 g/dL    Hematocrit 38.3 34.0 - 46.6 %    MCV 84.9 79.0 - 97.0 fL    MCH 26.8 26.6 - 33.0 pg    MCHC 31.6 31.5 - 35.7 g/dL    RDW 13.2 12.3 - 15.4 %    RDW-SD 41.1 37.0 - 54.0 fl    MPV 11.3 6.0 - 12.0 fL    Platelets 250 140 - 450 10*3/mm3    Neutrophil % 54.3 42.7 - 76.0 %    Lymphocyte % 39.3 19.6 - 45.3 %    Monocyte % 5.2 5.0 - 12.0 %    Eosinophil % 0.7 0.3 - 6.2 %    Basophil % 0.4 0.0 - 1.5 %    Immature Grans % 0.1 0.0 - 0.5 %    Neutrophils, Absolute 3.90 1.70 - 7.00 10*3/mm3    Lymphocytes, Absolute 2.82 0.70 - 3.10 10*3/mm3    Monocytes, Absolute 0.37 0.10 - 0.90 10*3/mm3    Eosinophils, Absolute 0.05 0.00 - 0.40 10*3/mm3    Basophils, Absolute 0.03 0.00 - 0.20 10*3/mm3    Immature Grans, Absolute 0.01 0.00 - 0.05 10*3/mm3    nRBC 0.0 0.0 - 0.2 /100 WBC   ECG 12 Lead Dyspnea   Result Value Ref Range    QT Interval 417 ms    QTC Interval 421 ms   Gold Top - SST   Result Value Ref Range    Extra Tube Hold for add-ons.      XR Chest 1 View    Result Date: 8/28/2024  Impression: No acute cardiopulmonary process. Electronically Signed: Quique Wilson MD  8/28/2024 11:53 AM EDT  Workstation ID: [id number]                                My interpretation of EKG shows sinus rhythm, rate of 61, no ST elevation          Medical Decision Making  Amount and/or Complexity of Data Reviewed  Labs: ordered.  Radiology: ordered.  ECG/medicine tests: ordered.    Risk  Prescription drug management.      Patient had the above evaluation.  Results were discussed with the patient.   My interpretation of chest x-ray shows no infiltrate or effusion.  EKG shows no acute ischemia.  White blood cell count is normal.  BMP is unremarkable.  Patient has remained well-appearing in the emergency room.  She has already talked to her provider and they are taking her off the Celexa and putting her on Lexapro.  She states she has been on Lexapro before and tolerated it well.  She will be discharged to follow-up with her provider.      Final diagnoses:   Adverse effect of drug, initial encounter       ED Disposition  ED Disposition       ED Disposition   Discharge    Condition   Stable    Comment   --               Jade Whitlock, APRN  [address] [facility] [address] [place] [address] [place] [id number]  [phone]    Call in 2 days           Medication List        Stop      citalopram 20 MG tablet  Commonly known as: David Whitman MD  08/28/24 123

## 2024-08-28 NOTE — TELEPHONE ENCOUNTER
Pt says she is at [facility] ER and the doctor thinks Celexa is causing side effects.  She has taken it for 2 days and off buspar 6 days.  She has been feeling sedated and like she cannot breath, and her oxygen was 70%.  Bp was 160/113. They are also checking for QT prolongation.  He recommended stopping Celexa.      Pt says Lexapro worked for her in the past, so she would like a low dose like 10 mg.  Please advise.

## 2024-08-28 NOTE — ED NOTES
Pt c/o being anxious.  Pt sts she took Celexa yesterday and this morning and feels soa and anxious.  Pt sts she has a panic disorder.  She sts that her mother and aunt are dying of cancer and she isn't handling it well.  Dr. Adams started her on Celexa and pt sts she read the side effects and is now having multiple such as soa, fatigue, and heart racing at times (she sts that when the heart is racing she is panicking.  She sts she knows that is why her heart is racing)  Pt is rational when speaking w/ nurse.

## 2024-08-29 LAB
QT INTERVAL: 417 MS
QTC INTERVAL: 421 MS

## 2024-08-29 NOTE — TELEPHONE ENCOUNTER
Lexapro has the same issues with heart and EKG Qtc prolongation     Did she try zoloft in the past? It has safe cardio side effects

## 2024-08-29 NOTE — TELEPHONE ENCOUNTER
Pt  called back says she is out of the hospital, and she was having heart palpitations, but her EKG was good, so they didn't tell her she had palpitations, but then read in the discharge papers that she was positive for heart palpitation.  She almost called the ambulance again because she was so anxious about her heart after reading that.  She stopped taking Celexa after ER Dr recommended it, but she is feeling on edge with anxiety and doesn't know if she should get admitted to [facility], but she doesn't have insurance, and she doesn't want to go to [facility] either because of the cost.  Or should she just take a low dose of Lexapro again?

## 2024-08-29 NOTE — TELEPHONE ENCOUNTER
Pt says zoloft gave her wt gain in the past, but she is not worried about it because she had gastric sleeve surgery and she just wants to feel better.  If you could please send zoloft to [facility] in [place].  As long as you think it will help with anxiety and panic, she would like to take it.

## 2024-08-30 ENCOUNTER — OFFICE VISIT (OUTPATIENT)
Dept: FAMILY MEDICINE CLINIC | Facility: CLINIC | Age: 29
End: 2024-08-30

## 2024-08-30 VITALS
BODY MASS INDEX: 36.1 KG/M2 | TEMPERATURE: 97.6 F | RESPIRATION RATE: 16 BRPM | WEIGHT: 238.2 LBS | HEIGHT: 68 IN | OXYGEN SATURATION: 99 % | HEART RATE: 77 BPM | DIASTOLIC BLOOD PRESSURE: 80 MMHG | SYSTOLIC BLOOD PRESSURE: 128 MMHG

## 2024-08-30 DIAGNOSIS — F41.1 GENERALIZED ANXIETY DISORDER: Chronic | ICD-10-CM

## 2024-08-30 DIAGNOSIS — R73.01 IMPAIRED FASTING GLUCOSE: Chronic | ICD-10-CM

## 2024-08-30 DIAGNOSIS — I15.9 SECONDARY HYPERTENSION: Chronic | ICD-10-CM

## 2024-08-30 LAB
CHOLEST SERPL-MCNC: 166 MG/DL (ref 0–200)
HDLC SERPL-MCNC: 52 MG/DL (ref 40–60)
LDLC SERPL CALC-MCNC: 96 MG/DL (ref 0–100)
LDLC/HDLC SERPL: 1.81 {RATIO}
TRIGL SERPL-MCNC: 99 MG/DL (ref 0–150)
TSH SERPL DL<=0.05 MIU/L-ACNC: 0.75 UIU/ML (ref 0.27–4.2)
VLDLC SERPL-MCNC: 18 MG/DL (ref 5–40)

## 2024-08-30 RX ORDER — LORAZEPAM 0.5 MG/1
0.5 TABLET ORAL
COMMUNITY
Start: 2024-07-25

## 2024-08-30 NOTE — PROGRESS NOTES
Subjective        Brandi Ash is a 29 y.o. female.     Chief Complaint   Patient presents with   • Hypertension   • Anxiety       Hypertension  Associated symptoms include anxiety.   Anxiety  Notes reviewed recent Ed visit. 8/28/2024.  Patient is here for management of her hypertension, and anxiety.   She was recently seen in the Ed after started celexa for 2 days and she felt she could not function and said her oxygen sats were dropping to 70-80 with low heart rate. She said she read she was having heart palpations. She said she thought she was dying. She went to the ED with her symptoms. She said when she reads side effects about medications sometimes she feels she has the reaction.     At this time  She is on clonazepam as needed but she is talking to her psychiatrist. May start zoloft as soon as today.     She said she had panic attack while on vacation. She had to go home and take her clonazepam. This helped her during that time.     Anxiety: she is followed by psychiatry and currently only taking chlorazepam. She is supposed to start zoloft today.  Exam today was spent counseling her about coping mechanisms.   She will leave office today and go to psych office to discuss her medications and starting zoloft before holiday weekend.     Hypertension: she is taking metoprolol 50 mg daily. But now reports she is having low heart rate.   She is very anxious about her blood pressures.   She is having panic attacks.   She feels like her heart rate is too low when it runs in the 50's.               The following portions of the patient's history were reviewed and updated as appropriate: allergies, current medications, past family history, past medical history, past social history, past surgical history and problem list.      Current Outpatient Medications:   •  clonazePAM (KlonoPIN) 0.5 MG tablet, Take 1 tablet by mouth 2 (Two) Times a Day As Needed for Anxiety., Disp: 60 tablet, Rfl: 0  •   clotrimazole-betamethasone (LOTRISONE) 1-0.05 % cream, Apply 1 Application topically to the appropriate area as directed 2 (Two) Times a Day., Disp: 45 g, Rfl: 1  •  docusate sodium (COLACE) 100 MG capsule, Take 1 capsule by mouth 2 (Two) Times a Day., Disp: 60 capsule, Rfl: 0  •  metoprolol succinate XL (TOPROL-XL) 50 MG 24 hr tablet, Take 1 tablet by mouth Daily., Disp: 30 tablet, Rfl: 6  •  multivitamin with minerals tablet tablet, Take 1 tablet by mouth Daily., Disp: , Rfl:   •  LORazepam (ATIVAN) 0.5 MG tablet, Take 1 tablet by mouth. (Patient not taking: Reported on 8/30/2024), Disp: , Rfl:   •  sertraline (Zoloft) 50 MG tablet, Take 1 tablet by mouth Daily., Disp: 30 tablet, Rfl: 2    Recent Results (from the past 4032 hour(s))   Methylmalonic Acid, Serum    Collection Time: 04/16/24 12:47 PM    Specimen: Blood   Result Value Ref Range    Methylmalonic Acid 152 0 - 378 nmol/L   Phosphorus    Collection Time: 04/16/24 12:47 PM    Specimen: Blood   Result Value Ref Range    Phosphorus 3.4 2.5 - 4.5 mg/dL   Magnesium    Collection Time: 04/16/24 12:47 PM    Specimen: Blood   Result Value Ref Range    Magnesium 2.1 1.6 - 2.6 mg/dL   Comprehensive Metabolic Panel    Collection Time: 04/16/24 12:47 PM    Specimen: Blood   Result Value Ref Range    Glucose 87 65 - 99 mg/dL    BUN 12 6 - 20 mg/dL    Creatinine 0.74 0.57 - 1.00 mg/dL    Sodium 136 136 - 145 mmol/L    Potassium 4.2 3.5 - 5.2 mmol/L    Chloride 100 98 - 107 mmol/L    CO2 26.5 22.0 - 29.0 mmol/L    Calcium 9.8 8.6 - 10.5 mg/dL    Total Protein 7.0 6.0 - 8.5 g/dL    Albumin 4.1 3.5 - 5.2 g/dL    ALT (SGPT) 21 1 - 33 U/L    AST (SGOT) 13 1 - 32 U/L    Alkaline Phosphatase 91 39 - 117 U/L    Total Bilirubin 0.4 0.0 - 1.2 mg/dL    Globulin 2.9 gm/dL    A/G Ratio 1.4 g/dL    BUN/Creatinine Ratio 16.2 7.0 - 25.0    Anion Gap 9.5 5.0 - 15.0 mmol/L    eGFR 112.5 >60.0 mL/min/1.73   Vitamin D,25-Hydroxy    Collection Time: 04/16/24 12:47 PM    Specimen: Blood    Result Value Ref Range    25 Hydroxy, Vitamin D 39.6 30.0 - 100.0 ng/ml   Iron Profile    Collection Time: 04/16/24 12:47 PM    Specimen: Blood   Result Value Ref Range    Iron 59 37 - 145 mcg/dL    Iron Saturation (TSAT) 14 (L) 20 - 50 %    Transferrin 279 200 - 360 mg/dL    TIBC 416 298 - 536 mcg/dL   CBC Auto Differential    Collection Time: 04/16/24 12:47 PM    Specimen: Blood   Result Value Ref Range    WBC 7.93 3.40 - 10.80 10*3/mm3    RBC 4.69 3.77 - 5.28 10*6/mm3    Hemoglobin 12.3 12.0 - 15.9 g/dL    Hematocrit 38.7 34.0 - 46.6 %    MCV 82.5 79.0 - 97.0 fL    MCH 26.2 (L) 26.6 - 33.0 pg    MCHC 31.8 31.5 - 35.7 g/dL    RDW 13.4 12.3 - 15.4 %    RDW-SD 39.8 37.0 - 54.0 fl    MPV 12.0 6.0 - 12.0 fL    Platelets 275 140 - 450 10*3/mm3    Neutrophil % 55.1 42.7 - 76.0 %    Lymphocyte % 37.6 19.6 - 45.3 %    Monocyte % 5.8 5.0 - 12.0 %    Eosinophil % 0.9 0.3 - 6.2 %    Basophil % 0.5 0.0 - 1.5 %    Immature Grans % 0.1 0.0 - 0.5 %    Neutrophils, Absolute 4.37 1.70 - 7.00 10*3/mm3    Lymphocytes, Absolute 2.98 0.70 - 3.10 10*3/mm3    Monocytes, Absolute 0.46 0.10 - 0.90 10*3/mm3    Eosinophils, Absolute 0.07 0.00 - 0.40 10*3/mm3    Basophils, Absolute 0.04 0.00 - 0.20 10*3/mm3    Immature Grans, Absolute 0.01 0.00 - 0.05 10*3/mm3    nRBC 0.0 0.0 - 0.2 /100 WBC   POC Glucose Once    Collection Time: 04/18/24  7:01 AM    Specimen: Blood   Result Value Ref Range    Glucose 106 (H) 70 - 105 mg/dL   Urine Drug Screen - Urine, Clean Catch    Collection Time: 04/18/24  7:05 AM    Specimen: Urine, Clean Catch   Result Value Ref Range    Amphet/Methamphet, Screen Negative Negative    Barbiturates Screen, Urine Negative Negative    Benzodiazepine Screen, Urine Negative Negative    Cocaine Screen, Urine Negative Negative    Opiate Screen Negative Negative    THC, Screen, Urine Negative Negative    Methadone Screen, Urine Negative Negative    Oxycodone Screen, Urine Negative Negative   Pregnancy, Urine - Urine, Clean  Catch    Collection Time: 04/18/24  7:05 AM    Specimen: Urine, Clean Catch   Result Value Ref Range    HCG, Urine QL Negative Negative   Urinalysis With Microscopic If Indicated (No Culture) - Urine, Clean Catch    Collection Time: 04/18/24  7:05 AM    Specimen: Urine, Clean Catch   Result Value Ref Range    Color, UA Yellow Yellow, Straw    Appearance, UA Clear Clear    pH, UA 8.0 5.0 - 8.0    Specific Gravity, UA 1.006 1.005 - 1.030    Glucose, UA Negative Negative    Ketones, UA Negative Negative    Bilirubin, UA Negative Negative    Blood, UA Negative Negative    Protein, UA Negative Negative    Leuk Esterase, UA Negative Negative    Nitrite, UA Negative Negative    Urobilinogen, UA 0.2 E.U./dL 0.2 - 1.0 E.U./dL   Comprehensive Metabolic Panel    Collection Time: 04/18/24  7:15 AM    Specimen: Blood   Result Value Ref Range    Glucose 107 (H) 65 - 99 mg/dL    BUN 11 6 - 20 mg/dL    Creatinine 0.67 0.57 - 1.00 mg/dL    Sodium 143 136 - 145 mmol/L    Potassium 4.1 3.5 - 5.2 mmol/L    Chloride 105 98 - 107 mmol/L    CO2 24.0 22.0 - 29.0 mmol/L    Calcium 9.2 8.6 - 10.5 mg/dL    Total Protein 7.8 6.0 - 8.5 g/dL    Albumin 4.2 3.5 - 5.2 g/dL    ALT (SGPT) 19 1 - 33 U/L    AST (SGOT) 20 1 - 32 U/L    Alkaline Phosphatase 93 39 - 117 U/L    Total Bilirubin 0.2 0.0 - 1.2 mg/dL    Globulin 3.6 gm/dL    A/G Ratio 1.2 g/dL    BUN/Creatinine Ratio 16.4 7.0 - 25.0    Anion Gap 14.0 5.0 - 15.0 mmol/L    eGFR 121.5 >60.0 mL/min/1.73   Ethanol    Collection Time: 04/18/24  7:15 AM    Specimen: Blood   Result Value Ref Range    Ethanol % 0.151 %   CBC Auto Differential    Collection Time: 04/18/24  7:15 AM    Specimen: Blood   Result Value Ref Range    WBC 9.26 3.40 - 10.80 10*3/mm3    RBC 4.87 3.77 - 5.28 10*6/mm3    Hemoglobin 12.8 12.0 - 15.9 g/dL    Hematocrit 40.4 34.0 - 46.6 %    MCV 83.0 79.0 - 97.0 fL    MCH 26.3 (L) 26.6 - 33.0 pg    MCHC 31.7 31.5 - 35.7 g/dL    RDW 13.2 12.3 - 15.4 %    RDW-SD 39.6 37.0 - 54.0 fl     MPV 11.3 6.0 - 12.0 fL    Platelets 296 140 - 450 10*3/mm3    Neutrophil % 56.7 42.7 - 76.0 %    Lymphocyte % 37.1 19.6 - 45.3 %    Monocyte % 5.1 5.0 - 12.0 %    Eosinophil % 0.4 0.3 - 6.2 %    Basophil % 0.5 0.0 - 1.5 %    Immature Grans % 0.2 0.0 - 0.5 %    Neutrophils, Absolute 5.24 1.70 - 7.00 10*3/mm3    Lymphocytes, Absolute 3.44 (H) 0.70 - 3.10 10*3/mm3    Monocytes, Absolute 0.47 0.10 - 0.90 10*3/mm3    Eosinophils, Absolute 0.04 0.00 - 0.40 10*3/mm3    Basophils, Absolute 0.05 0.00 - 0.20 10*3/mm3    Immature Grans, Absolute 0.02 0.00 - 0.05 10*3/mm3    nRBC 0.0 0.0 - 0.2 /100 WBC   Basic Metabolic Panel    Collection Time: 08/28/24 11:25 AM    Specimen: Blood   Result Value Ref Range    Glucose 92 65 - 99 mg/dL    BUN 12 6 - 20 mg/dL    Creatinine 0.67 0.57 - 1.00 mg/dL    Sodium 141 136 - 145 mmol/L    Potassium 4.2 3.5 - 5.2 mmol/L    Chloride 106 98 - 107 mmol/L    CO2 24.3 22.0 - 29.0 mmol/L    Calcium 9.6 8.6 - 10.5 mg/dL    BUN/Creatinine Ratio 17.9 7.0 - 25.0    Anion Gap 10.7 5.0 - 15.0 mmol/L    eGFR 120.8 >60.0 mL/min/1.73   Magnesium    Collection Time: 08/28/24 11:25 AM    Specimen: Blood   Result Value Ref Range    Magnesium 2.5 1.6 - 2.6 mg/dL   CBC Auto Differential    Collection Time: 08/28/24 11:25 AM    Specimen: Blood   Result Value Ref Range    WBC 7.18 3.40 - 10.80 10*3/mm3    RBC 4.51 3.77 - 5.28 10*6/mm3    Hemoglobin 12.1 12.0 - 15.9 g/dL    Hematocrit 38.3 34.0 - 46.6 %    MCV 84.9 79.0 - 97.0 fL    MCH 26.8 26.6 - 33.0 pg    MCHC 31.6 31.5 - 35.7 g/dL    RDW 13.2 12.3 - 15.4 %    RDW-SD 41.1 37.0 - 54.0 fl    MPV 11.3 6.0 - 12.0 fL    Platelets 250 140 - 450 10*3/mm3    Neutrophil % 54.3 42.7 - 76.0 %    Lymphocyte % 39.3 19.6 - 45.3 %    Monocyte % 5.2 5.0 - 12.0 %    Eosinophil % 0.7 0.3 - 6.2 %    Basophil % 0.4 0.0 - 1.5 %    Immature Grans % 0.1 0.0 - 0.5 %    Neutrophils, Absolute 3.90 1.70 - 7.00 10*3/mm3    Lymphocytes, Absolute 2.82 0.70 - 3.10 10*3/mm3    Monocytes,  "Absolute 0.37 0.10 - 0.90 10*3/mm3    Eosinophils, Absolute 0.05 0.00 - 0.40 10*3/mm3    Basophils, Absolute 0.03 0.00 - 0.20 10*3/mm3    Immature Grans, Absolute 0.01 0.00 - 0.05 10*3/mm3    nRBC 0.0 0.0 - 0.2 /100 WBC   Gold Top - SST    Collection Time: 08/28/24 11:25 AM   Result Value Ref Range    Extra Tube Hold for add-ons.    Lipid Panel    Collection Time: 08/28/24 11:25 AM    Specimen: Blood   Result Value Ref Range    Total Cholesterol 166 0 - 200 mg/dL    Triglycerides 99 0 - 150 mg/dL    HDL Cholesterol 52 40 - 60 mg/dL    LDL Cholesterol  96 0 - 100 mg/dL    VLDL Cholesterol 18 5 - 40 mg/dL    LDL/HDL Ratio 1.81    TSH Rfx On Abnormal To Free T4    Collection Time: 08/28/24 11:25 AM    Specimen: Blood   Result Value Ref Range    TSH 0.749 0.270 - 4.200 uIU/mL   ECG 12 Lead Dyspnea    Collection Time: 08/28/24 11:26 AM   Result Value Ref Range    QT Interval 417 ms    QTC Interval 421 ms         Review of Systems    Objective     /80   Pulse 77   Temp 97.6 °F (36.4 °C) (Oral)   Resp 16   Ht 172.7 cm (68\")   Wt 108 kg (238 lb 3.2 oz)   SpO2 99%   BMI 36.22 kg/m²     Physical Exam  Vitals and nursing note reviewed.   Constitutional:       Appearance: She is obese.   HENT:      Head: Normocephalic.      Right Ear: Tympanic membrane normal.      Left Ear: Tympanic membrane normal.      Mouth/Throat:      Mouth: Mucous membranes are moist.      Pharynx: Oropharynx is clear.   Eyes:      Pupils: Pupils are equal, round, and reactive to light.   Cardiovascular:      Rate and Rhythm: Normal rate and regular rhythm.      Pulses: Normal pulses.      Heart sounds: Normal heart sounds.   Pulmonary:      Effort: Pulmonary effort is normal.      Breath sounds: Normal breath sounds.   Abdominal:      Palpations: Abdomen is soft.   Skin:     General: Skin is warm.   Neurological:      General: No focal deficit present.      Mental Status: She is alert and oriented to person, place, and time.   Psychiatric:  "        Attention and Perception: Attention normal.         Mood and Affect: Mood is anxious.         Behavior: Behavior is hyperactive.         Cognition and Memory: Cognition normal.         Judgment: Judgment normal.       Result Review :                Assessment & Plan    Diagnoses and all orders for this visit:    1. Impaired fasting glucose  Comments:  she is wanting to make sure she is not a  diabetic. has lost 135 lbs since her bariatric surgery  Orders:  -     TSH Rfx On Abnormal To Free T4; Future    2. Secondary hypertension  Comments:  probably related to her anxiety. taking metoprolol 50 mg daily. blood pressure excellent in the offic etoday.  Orders:  -     Lipid Panel; Future  -     TSH Rfx On Abnormal To Free T4; Future    3. Generalized anxiety disorder  Comments:  followed by psych having anixety attacks affecting blood pressure.      There are no Patient Instructions on file for this visit.    Follow Up   No follow-ups on file.    Patient was given instructions and counseling regarding her condition or for health maintenance advice. Please see specific information pulled into the AVS if appropriate.     Jade Whitlock, APRN    08/30/24

## 2024-08-30 NOTE — PATIENT INSTRUCTIONS
Monitor blood pressure  F/u one month   Go to psychiatry office and find out about the zoloft.   If you feel you are suicidal or homicidal or just can't cope go to a psychiatric facility such as [facility],

## 2024-09-04 DIAGNOSIS — F40.01 PANIC DISORDER WITH AGORAPHOBIA: ICD-10-CM

## 2024-09-04 DIAGNOSIS — F43.10 POSTTRAUMATIC STRESS DISORDER: ICD-10-CM

## 2024-09-04 DIAGNOSIS — F41.1 GENERALIZED ANXIETY DISORDER: Chronic | ICD-10-CM

## 2024-09-05 RX ORDER — CLONAZEPAM 0.5 MG/1
0.5 TABLET ORAL 2 TIMES DAILY PRN
Qty: 60 TABLET | Refills: 0 | Status: SHIPPED | OUTPATIENT
Start: 2024-09-05

## 2024-09-05 NOTE — TELEPHONE ENCOUNTER
LAST FILL 8-9-24   Patient attended Phase 2 Cardiac Rehab Exercise Session. Further documentation will be scanned into the medical record upon discharge.

## 2024-09-05 NOTE — TELEPHONE ENCOUNTER
Rx Refill Note  Requested Prescriptions     Pending Prescriptions Disp Refills    clonazePAM (KlonoPIN) 0.5 MG tablet [Pharmacy Med Name: clonazePAM 0.5 MG Oral Tablet] 60 tablet 0     Sig: Take 1 tablet by mouth twice daily as needed for anxiety      Last office visit with prescribing clinician: 8/8/2024   Last telemedicine visit with prescribing clinician: Visit date not found   Next office visit with prescribing clinician: 11/7/2024   Office Visit with Sheridan Adams MD (08/08/2024)   Urine Drug Screen - Urine, Clean Catch (04/18/2024 07:05)                     Would you like a call back once the refill request has been completed: [] Yes [] No    If the office needs to give you a call back, can they leave a voicemail: [] Yes [] No    Zeenat Cooper MA  09/05/24, 09:45 EDT

## 2024-09-12 ENCOUNTER — TELEPHONE (OUTPATIENT)
Dept: BARIATRICS/WEIGHT MGMT | Facility: CLINIC | Age: 29
End: 2024-09-12

## 2024-09-12 NOTE — TELEPHONE ENCOUNTER
Left vm for pt to call office    Pt has appt with Michelle 9.13.24///she is not showing any active insurance.  Need to ask pt if there is another insurance policy she has and what she is being seen for. Pt possibly needs to see RD instead

## 2024-09-12 NOTE — TELEPHONE ENCOUNTER
Pt returned call stated she does not have active insurance and is aware of the self pay cost. Pt also stated she would like to keep appt with Michelle

## 2024-09-13 ENCOUNTER — OFFICE VISIT (OUTPATIENT)
Dept: BARIATRICS/WEIGHT MGMT | Facility: CLINIC | Age: 29
End: 2024-09-13

## 2024-09-13 VITALS
OXYGEN SATURATION: 100 % | BODY MASS INDEX: 35.28 KG/M2 | HEIGHT: 68 IN | RESPIRATION RATE: 18 BRPM | WEIGHT: 232.8 LBS | HEART RATE: 86 BPM | SYSTOLIC BLOOD PRESSURE: 140 MMHG | DIASTOLIC BLOOD PRESSURE: 97 MMHG

## 2024-09-13 DIAGNOSIS — F41.9 ANXIETY: ICD-10-CM

## 2024-09-13 DIAGNOSIS — Z90.3 H/O GASTRIC SLEEVE: ICD-10-CM

## 2024-09-13 DIAGNOSIS — E66.9 OBESITY, CLASS II, BMI 35-39.9: Primary | ICD-10-CM

## 2024-09-13 PROCEDURE — 99214 OFFICE O/P EST MOD 30 MIN: CPT | Performed by: NURSE PRACTITIONER

## 2024-09-13 PROCEDURE — 1159F MED LIST DOCD IN RCRD: CPT | Performed by: NURSE PRACTITIONER

## 2024-09-13 PROCEDURE — 1160F RVW MEDS BY RX/DR IN RCRD: CPT | Performed by: NURSE PRACTITIONER

## 2024-09-13 RX ORDER — OMEPRAZOLE 40 MG/1
40 CAPSULE, DELAYED RELEASE ORAL DAILY
Qty: 30 CAPSULE | Refills: 6 | Status: SHIPPED | OUTPATIENT
Start: 2024-09-13

## 2024-09-13 NOTE — PROGRESS NOTES
MGK BAR SURG [facility] GROUP BARIATRIC SURGERY  [address] [place] [address]  [address] [place] [address]  Dept: [phone]  9/13/2024      Brandi Ash.  [id number]  [id number]  1995  female    Date of last surgery: 9/5/2023GASTRIC SLEEVE LAPAROSCOPIC WITH DAVINCI ROBOT; hiatal hernia repair      Chief Complaint: BH Post-Op Bariatric Surgery:   Brandi Ash is status post procedure listed above  HPI:     Wt Readings from Last 10 Encounters:   09/13/24 106 kg (232 lb 12.8 oz)   08/30/24 108 kg (238 lb 3.2 oz)   08/28/24 109 kg (239 lb 3.2 oz)   07/11/24 110 kg (243 lb)   05/14/24 117 kg (257 lb)   04/18/24 74.8 kg (165 lb)   04/01/24 122 kg (269 lb)   01/05/24 132 kg (291 lb 14.2 oz)   10/12/23 (!) 146 kg (322 lb)   09/11/23 (!) 153 kg (336 lb 12.8 oz)    Goal weight 190 pounds     Today's weight is 106 kg (232 lb 12.8 oz) pounds,BMI 35.40 has a  loss of 59 pounds since the last visit and weight loss since surgery is 114  pounds. The patient reports a decreased portion size and loss of appetite.      Brandi Ash reportsreflux/heartburn some times, no nausea or vomiting      Diet and Exercise: Diet history reviewed and discussed with the patient. Weight loss/gains to date discussed with the patient.     She reports eating 3 meals per day, a typical portion size of 1/2-3/4 cup, eating 1-2 snacks per day, drinking 8 or more 8-oz. glasses of water per day, no carbonated beverage consumption and exercising regularly.       The patient states they are eating 60 grams of protein per day.     Started back on metoprolol  Having a lot of anxiety - following up with DR. Adams - started on Zoloft - is without insurance right now so would like to hold off on referral for psychology at this time       Breakfast: protein coffee / turkey sausage and egg   Lunch: chicken , ground turkey with taco seasoning and low carb tortilla   Dinner: similar to  lunch with chicken and veggies, fish   Snacks:melon , greek yogurt, string cheese, jerky, nuts, protein granola with greek yogurt, fruit   Drinks:coffee, protein shake, water , no carbonation   Exercise: just started in a gym - hiring a physical training - strength training       Review of Systems   Constitutional:  Positive for activity change and appetite change.   Respiratory: Negative.     Cardiovascular: Negative.    Gastrointestinal: Negative.    Musculoskeletal: Negative.    Psychiatric/Behavioral:          Anxiety          Patient Active Problem List   Diagnosis    Mass overlapping multiple quadrants of right breast    Abnormal vaginal bleeding    Acanthosis nigricans    ADHD    Amenorrhea    Generalized anxiety disorder    BMI 50.0-59.9, adult    Elevated blood pressure reading without diagnosis of hypertension    Contraception management    Heartburn    History of renal calculi    Impaired glucose tolerance    Knee pain, right    Morbid obesity    Panic disorder with agoraphobia    Polycystic ovarian syndrome    Posttraumatic stress disorder    Tinea corporis    Need for hepatitis C screening test    Vaginal yeast infection    Impaired fasting glucose    Urinary frequency    Secondary hypertension    Mass of lower outer quadrant of left breast    Pre-operative examination    Vaping-related disorder    Rash and nonspecific skin eruption       Past Medical History:   Diagnosis Date    Anxiety     DDD (degenerative disc disease), lumbar     Hypertension     Kidney stone     Panic disorder     PCOS (polycystic ovarian syndrome)     Pre-diabetes     Vitamin D deficiency      Past Surgical History:   Procedure Laterality Date    TONSILLECTOMY       SECTION  2010    ENDOSCOPY N/A 2023    Procedure: ESOPHAGOGASTRODUODENOSCOPY with biopsy x1 area;  Surgeon: Cari Smith MD;  Location: [facility] ENDOSCOPY;  Service: General;  Laterality: N/A;  Post- small hiatal hernia    GASTRIC SLEEVE LAPAROSCOPIC  N/A 9/5/2023    Procedure: GASTRIC SLEEVE LAPAROSCOPIC WITH DAVINCI ROBOT; hiatal hernia repair;  Surgeon: Cari Smith MD;  Location: [facility] MAIN OR;  Service: Robotics - DaVinci;  Laterality: N/A;    CYSTOSCOPY W/ LASER LITHOTRIPSY      ENDOMETRIAL BIOPSY        The following portions of the patient's history were reviewed and updated as appropriate: allergies, current medications, past medical history, past social history, past surgical history, and problem list.    Vitals:    09/13/24 1246   Resp: 18   SpO2: 97%       Physical Exam  Constitutional:       Appearance: Normal appearance. She is obese.   Pulmonary:      Effort: Pulmonary effort is normal.   Abdominal:      General: Abdomen is flat.   Neurological:      General: No focal deficit present.      Mental Status: She is alert and oriented to person, place, and time.   Psychiatric:         Mood and Affect: Mood normal.         Behavior: Behavior normal.         Thought Content: Thought content normal.         Judgment: Judgment normal.             Assessment:   BMI 35.40, class 1 obesity, 1 yr gastric sleeve , anxiety    Post-op, the patient is doing great. She is getting in over 60 grams of protein in her diet a day and is also exercise with a  several days a week. Pt was concerned about too much protein or water intake. Pt is getting over 100 oz of water a day. Will check labs to assess further, but I encouraged pt to continue drinking over 64 oz of fluids a day and try and only weigh herself once weekly.  Pt has a lot of underlying anxiety. She is followed by DR. Adams who recently switched her psych meds. I did [facility] the [facility], but pt states she does not have active insurance at the moment and will do this once she has active insurance. Plan to check labs and follow up in 1 yr. Pt would benefit from dietary counseling at that time.     Plan:     Encouraged patient to be sure  to get plenty of lean protein per day through small frequent meals all with a protein source.   Activity restrictions: none.   Recommended patient be sure to get at least 70 grams of protein per day by eating small, frequent meals all with high lean protein choices. Be sure to limit/cut back on daily carbohydrate intake. Discussed with the patient the recommended amount of water per day to intake- half of body weight in ounces. Reviewed vitamin requirements. Be sure to do routine exercise, 150 minutes per week minimum, including both cardio and strength training.     Instructions / Recommendations: dietary counseling recommended, recommended a daily protein intake of  grams, vitamin supplement(s) recommended, recommended exercising at least 150 minutes per week, behavior modifications recommended and instructed to call the office for concerns, questions, or problems.     The patient was instructed to follow up in 12 months.     The patient was counseled regarding diet and exercise/ anxiety. Total time spent face to face was 30 minutes and 15 minutes was spent counseling.     CHAUNCEY Rosario  [facility]ariat[facility]

## 2024-09-26 ENCOUNTER — OFFICE VISIT (OUTPATIENT)
Dept: FAMILY MEDICINE CLINIC | Facility: CLINIC | Age: 29
End: 2024-09-26

## 2024-09-26 VITALS
SYSTOLIC BLOOD PRESSURE: 138 MMHG | TEMPERATURE: 97.5 F | RESPIRATION RATE: 15 BRPM | BODY MASS INDEX: 35.25 KG/M2 | OXYGEN SATURATION: 99 % | HEART RATE: 74 BPM | DIASTOLIC BLOOD PRESSURE: 80 MMHG | HEIGHT: 68 IN | WEIGHT: 232.6 LBS

## 2024-09-26 DIAGNOSIS — R21 RASH AND NONSPECIFIC SKIN ERUPTION: Chronic | ICD-10-CM

## 2024-09-26 DIAGNOSIS — I15.9 SECONDARY HYPERTENSION: Primary | Chronic | ICD-10-CM

## 2024-09-26 DIAGNOSIS — F41.1 GENERALIZED ANXIETY DISORDER: Chronic | ICD-10-CM

## 2024-09-26 DIAGNOSIS — R00.1 BRADYCARDIA: Chronic | ICD-10-CM

## 2024-09-26 DIAGNOSIS — E66.01 CLASS 2 SEVERE OBESITY WITH BODY MASS INDEX (BMI) OF 35 TO 39.9 WITH SERIOUS COMORBIDITY: Chronic | ICD-10-CM

## 2024-09-26 RX ORDER — METOPROLOL SUCCINATE 25 MG/1
25 TABLET, EXTENDED RELEASE ORAL DAILY
Qty: 30 TABLET | Refills: 0 | Status: SHIPPED | OUTPATIENT
Start: 2024-09-26

## 2024-09-26 RX ORDER — CLOTRIMAZOLE AND BETAMETHASONE DIPROPIONATE 10; .64 MG/G; MG/G
1 CREAM TOPICAL 2 TIMES DAILY
Qty: 45 G | Refills: 1 | Status: SHIPPED | OUTPATIENT
Start: 2024-09-26

## 2024-10-01 ENCOUNTER — OFFICE VISIT (OUTPATIENT)
Dept: CARDIOLOGY | Facility: CLINIC | Age: 29
End: 2024-10-01
Payer: MEDICAID

## 2024-10-01 VITALS
OXYGEN SATURATION: 99 % | HEART RATE: 84 BPM | HEIGHT: 68 IN | SYSTOLIC BLOOD PRESSURE: 141 MMHG | BODY MASS INDEX: 35.46 KG/M2 | DIASTOLIC BLOOD PRESSURE: 87 MMHG | WEIGHT: 234 LBS

## 2024-10-01 DIAGNOSIS — E66.01 CLASS 3 SEVERE OBESITY DUE TO EXCESS CALORIES WITHOUT SERIOUS COMORBIDITY WITH BODY MASS INDEX (BMI) OF 50.0 TO 59.9 IN ADULT: ICD-10-CM

## 2024-10-01 DIAGNOSIS — Z01.810 PRE-OPERATIVE CARDIOVASCULAR EXAMINATION: Primary | ICD-10-CM

## 2024-10-01 DIAGNOSIS — F41.9 ANXIETY AND DEPRESSION: ICD-10-CM

## 2024-10-01 DIAGNOSIS — K21.9 GASTROESOPHAGEAL REFLUX DISEASE WITHOUT ESOPHAGITIS: ICD-10-CM

## 2024-10-01 DIAGNOSIS — E66.813 CLASS 3 SEVERE OBESITY DUE TO EXCESS CALORIES WITHOUT SERIOUS COMORBIDITY WITH BODY MASS INDEX (BMI) OF 50.0 TO 59.9 IN ADULT: ICD-10-CM

## 2024-10-01 DIAGNOSIS — F32.A ANXIETY AND DEPRESSION: ICD-10-CM

## 2024-10-01 DIAGNOSIS — R73.03 PRE-DIABETES: ICD-10-CM

## 2024-10-01 DIAGNOSIS — E78.2 MIXED HYPERLIPIDEMIA: ICD-10-CM

## 2024-10-01 DIAGNOSIS — I10 PRIMARY HYPERTENSION: ICD-10-CM

## 2024-10-01 PROCEDURE — 99214 OFFICE O/P EST MOD 30 MIN: CPT | Performed by: INTERNAL MEDICINE

## 2024-10-01 RX ORDER — LOSARTAN POTASSIUM 25 MG/1
25 TABLET ORAL DAILY
Qty: 90 TABLET | Refills: 3 | Status: SHIPPED | OUTPATIENT
Start: 2024-10-01

## 2024-10-01 NOTE — PROGRESS NOTES
Encounter Date:10/01/2024        Patient ID: Brandi Ash is a 29 y.o. female.      Chief Complaint:      History of Present Illness  29-year-old pleasant woman with hypertension, anxiety disorder, degenerative disc disease, polycystic ovarian syndrome and morbid obesity comes to the cardiology office for follow-up.    She was previously seen for preop cardiovascular risk assessment prior to undergoing bariatric surgery.  Current cardiac medications include metoprolol.    She was recently on Celexa and noted several episodes of anxiety associated with significantly elevated blood pressure and bradycardia.  She has noted heart rate in the 40s and 50s at home.  She is now on sertraline and her anxiety is better.  Her blood pressure has improved however she continues to complain of bradycardia.    The following portions of the patient's history were reviewed and updated as appropriate: allergies, current medications, past family history, past medical history, past social history, past surgical history, and problem list.    Review of Systems   Constitutional: Negative for malaise/fatigue.   Cardiovascular:  Positive for palpitations. Negative for chest pain, dyspnea on exertion and leg swelling.   Respiratory:  Negative for cough and shortness of breath.    Gastrointestinal:  Positive for nausea. Negative for abdominal pain and vomiting.   Neurological:  Positive for dizziness and light-headedness. Negative for focal weakness, headaches and numbness.   All other systems reviewed and are negative.      Current Outpatient Medications:     clonazePAM (KlonoPIN) 0.5 MG tablet, Take 1 tablet by mouth twice daily as needed for anxiety, Disp: 60 tablet, Rfl: 0    clotrimazole-betamethasone (LOTRISONE) 1-0.05 % cream, Apply 1 Application topically to the appropriate area as directed 2 (Two) Times a Day., Disp: 45 g, Rfl: 1    docusate sodium (COLACE) 100 MG capsule, Take 1 capsule by mouth 2 (Two) Times a Day., Disp: 60  capsule, Rfl: 0    metoprolol succinate XL (TOPROL-XL) 25 MG 24 hr tablet, Take 1 tablet by mouth Daily., Disp: 30 tablet, Rfl: 0    multivitamin with minerals tablet tablet, Take 1 tablet by mouth Daily., Disp: , Rfl:     omeprazole (priLOSEC) 40 MG capsule, Take 1 capsule by mouth Daily., Disp: 30 capsule, Rfl: 6    sertraline (Zoloft) 50 MG tablet, Take 1 tablet by mouth Daily., Disp: 30 tablet, Rfl: 2    Current outpatient and discharge medications have been reconciled for the patient.  Reviewed by: Don Cota MD       No Known Allergies    Family History   Problem Relation Age of Onset    Hypertension Mother     Obesity Mother     Sleep apnea Mother     Arthritis Mother     Obesity Maternal Grandmother     Hypertension Maternal Grandmother     Sleep apnea Maternal Grandmother     Alcohol abuse Maternal Grandmother     Depression Maternal Grandmother     Diabetes Maternal Grandmother     Mental illness Maternal Grandmother        Past Surgical History:   Procedure Laterality Date    BARIATRIC SURGERY       SECTION      CYSTOSCOPY W/ LASER LITHOTRIPSY      ENDOMETRIAL BIOPSY      ENDOSCOPY N/A 2023    Procedure: ESOPHAGOGASTRODUODENOSCOPY with biopsy x1 area;  Surgeon: Cari Smith MD;  Location: [facility] ENDOSCOPY;  Service: General;  Laterality: N/A;  Post- small hiatal hernia    GASTRIC SLEEVE LAPAROSCOPIC N/A 2023    Procedure: GASTRIC SLEEVE LAPAROSCOPIC WITH DAVINCI ROBOT; hiatal hernia repair;  Surgeon: Cari Smith MD;  Location: [facility] MAIN OR;  Service: Robotics - DaVinci;  Laterality: N/A;    HERNIA REPAIR      TONSILLECTOMY         Past Medical History:   Diagnosis Date    ADHD (attention deficit hyperactivity disorder)     Anemia     Anxiety     Clotting disorder 2016    DDD (degenerative disc disease), lumbar     Depression 2007    Hypertension     Kidney stone     Obesity 2012    Panic disorder     PCOS (polycystic ovarian syndrome)     Pre-diabetes      "Vitamin D deficiency        Family History   Problem Relation Age of Onset    Hypertension Mother     Obesity Mother     Sleep apnea Mother     Arthritis Mother     Obesity Maternal Grandmother     Hypertension Maternal Grandmother     Sleep apnea Maternal Grandmother     Alcohol abuse Maternal Grandmother     Depression Maternal Grandmother     Diabetes Maternal Grandmother     Mental illness Maternal Grandmother        Social History     Socioeconomic History    Marital status:    Tobacco Use    Smoking status: Former     Current packs/day: 0.00     Average packs/day: 0.5 packs/day for 12.9 years (6.5 ttl pk-yrs)     Types: Cigarettes     Start date: 1/21/2008     Quit date: 2021     Years since quitting: 3.7     Passive exposure: Past    Smokeless tobacco: Never   Vaping Use    Vaping status: Every Day    Last attempt to quit: 7/4/2023    Devices: Disposable    Passive vaping exposure: Yes   Substance and Sexual Activity    Alcohol use: Not Currently     Comment: social    Drug use: No    Sexual activity: Yes     Partners: Male     Birth control/protection: Condom               Objective:       Physical Exam    /87 (BP Location: Left arm, Patient Position: Sitting, Cuff Size: Large Adult)   Pulse 84   Ht 172.7 cm (68\")   Wt 106 kg (234 lb)   LMP  (LMP Unknown)   SpO2 99%   BMI 35.58 kg/m²   The patient is alert, oriented and in no distress.    Vital signs as noted above.    Head and neck revealed no carotid bruits or jugular venous distension.  No thyromegaly or lymphadenopathy is present.    Lungs clear.  No wheezing.  Breath sounds are normal bilaterally.    Heart normal first and second heart sounds.  No murmur..  No pericardial rub is present.  No gallop is present.    Abdomen soft and nontender.  No organomegaly is present.    Extremities revealed good peripheral pulses without any pedal edema.    Skin warm and dry.    Musculoskeletal system is grossly normal.    CNS grossly " normal.           Diagnosis Plan   1. Pre-operative cardiovascular examination        2. Primary hypertension        3. Mixed hyperlipidemia        4. Pre-diabetes        5. Class 3 severe obesity due to excess calories without serious comorbidity with body mass index (BMI) of 50.0 to 59.9 in adult        6. Anxiety and depression        7. Gastroesophageal reflux disease without esophagitis        LAB RESULTS (LAST 7 DAYS)    CBC        BMP        CMP         BNP        TROPONIN        CoAg        Creatinine Clearance  CrCl cannot be calculated (Patient's most recent lab result is older than the maximum 30 days allowed.).    ABG        Radiology  No radiology results for the last day    EKG  Procedures    Stress test      Echocardiogram  Results for orders placed in visit on 09/01/23    Adult Transthoracic Echo Complete W/ Cont if Necessary Per Protocol    Interpretation Summary    Left ventricular ejection fraction appears to be 56 - 60%.    Left ventricular diastolic function was normal.    No significant valvular abnormalities noted.      Cardiac catheterization  No results found for this or any previous visit.          Assessment and Plan       Diagnoses and all orders for this visit:    1. Pre-operative cardiovascular examination (Primary)    2. Primary hypertension    3. Mixed hyperlipidemia    4. Pre-diabetes    5. Class 3 severe obesity due to excess calories without serious comorbidity with body mass index (BMI) of 50.0 to 59.9 in adult    6. Anxiety and depression    7. Gastroesophageal reflux disease without esophagitis       Bradycardia  I will stop metoprolol  Reassurance provided to the patient     Primary hypertension/murmur  Discontinue metoprolol  Start losartan  She will maintain a blood pressure log by checking her blood pressure in the evening.  Hypertension is mostly secondary to anxiety  Echocardiogram shows preserved LV function with no significant valvular abnormalities.     Mixed  hyperlipidemia  LDL 96, HDL 52, triglycerides 99 and total cholesterol 166.  Goal LDL is less than 100  Lifestyle changes and diet modification recommended     Pre-diabetes  A1c 6.1  Lifestyle modification recommended    Anxiety and depression  She is on sertraline and clonazepam     Gastroesophageal reflux disease without esophagitis  She takes omeprazole as needed    Pre-operative cardiovascular examination (Primary)     Eliecer Perioperative Risk for Myocardial Infarction or Cardiac Arrest (KELLY):  0% Risk of myocardial infarction or cardiac arrest, intraoperatively or up to 30 days post-op     Revised Cardiac Risk Index for Pre-Operative Risk: 0 point  3.9% 30-day risk of death, MI, or cardiac arrest     A preop ECG showed sinus rhythm, possible left atrial enlargement.  It was read as borderline Q waves in inferior leads however these are physiological in a young patient.    It also read borderline T wave abnormalities which is frequently seen in large breasted patients.     She is status post gastric sleeve surgery.    Class 3 severe obesity due to excess calories without serious comorbidity with body mass index (BMI) of 50.0 to 59.9 in adult  Diet, exercise, weight loss, lifestyle modifications recommended.  Screening and treatment for sleep apnea recommended.  BMI is 36.  She weighs 234 pounds.  She previously weighed 349 pounds   Status post gastric sleeve surgery

## 2024-10-08 DIAGNOSIS — F41.1 GENERALIZED ANXIETY DISORDER: Chronic | ICD-10-CM

## 2024-10-08 DIAGNOSIS — F40.01 PANIC DISORDER WITH AGORAPHOBIA: ICD-10-CM

## 2024-10-08 DIAGNOSIS — F43.10 POSTTRAUMATIC STRESS DISORDER: ICD-10-CM

## 2024-10-09 ENCOUNTER — TELEPHONE (OUTPATIENT)
Dept: CARDIOLOGY | Facility: CLINIC | Age: 29
End: 2024-10-09
Payer: MEDICAID

## 2024-10-09 NOTE — TELEPHONE ENCOUNTER
----- Message from [facility] KyrieTamrnohemy sent at 10/8/2024  9:39 PM EDT -----  Regarding: Blood pressure and heart rate   Contact: [phone]  I was metroplol 50mg and it made my heart rate too low. So my pcp switched me to 25 mg about a week before I seen you on October first. Now you told me to stop that medication and switched me to cozaar. The problem I’m having now is my heart rate is high again. Resting it’s between  and just when I get up to go to the bathroom or walk in another room it’s going up in the 140s. What I’m wondering is did we not give the metroplol 25 mg enough time? Should I stop the cozaar and switch back to metropole 25 ? My blood pressure was pretty good on the metroplol. It does get a little high in the office because I have white coat syndrome but now my bp is back high again. Like 150/102

## 2024-10-09 NOTE — TELEPHONE ENCOUNTER
Rx Refill Note  Requested Prescriptions     Pending Prescriptions Disp Refills    clonazePAM (KlonoPIN) 0.5 MG tablet [Pharmacy Med Name: clonazePAM 0.5 MG Oral Tablet] 60 tablet 0     Sig: Take 1 tablet by mouth twice daily as needed for anxiety      Last office visit with prescribing clinician: 8/8/2024   Last telemedicine visit with prescribing clinician: Visit date not found   Next office visit with prescribing clinician: 11/7/2024   Office Visit with Sheridan Adams MD (08/08/2024)   Urine Drug Screen - Urine, Clean Catch (08/09/2023 10:26)                     Would you like a call back once the refill request has been completed: [] Yes [] No    If the office needs to give you a call back, can they leave a voicemail: [] Yes [] No    Zeenat Cooper MA  10/09/24, 09:35 EDT  UPLOADED

## 2024-10-09 NOTE — TELEPHONE ENCOUNTER
Brandi FORD [facility] (supporting Don Cota MD)6 minutes ago (10:39 AM)       Okay I believe I may have had a high anxiety day yesterday which was causing my heart rate to be so high. While I was sleeping last night my HR was 63. Now my resting HR today is 74. When I am up and walking around it’s around 109. But my blood pressure is still a little high. Do I just give the Cozaar a little more time to work? I am not sure how long it takes to get into my system to lower my blood pressure. Thank you!

## 2024-10-09 NOTE — TELEPHONE ENCOUNTER
She should continue taking losartan 25 mg daily for her blood pressure. She can restart metoprolol succinate 25 mg daily. She should monitor her blood pressure and heart rate daily and keep a log, then report back to us after 1 week.

## 2024-10-10 RX ORDER — CLONAZEPAM 0.5 MG/1
0.5 TABLET ORAL 2 TIMES DAILY PRN
Qty: 60 TABLET | Refills: 0 | Status: SHIPPED | OUTPATIENT
Start: 2024-10-10

## 2024-10-24 ENCOUNTER — TELEPHONE (OUTPATIENT)
Dept: CARDIOLOGY | Facility: CLINIC | Age: 29
End: 2024-10-24
Payer: MEDICAID

## 2024-10-24 ENCOUNTER — TELEPHONE (OUTPATIENT)
Dept: PSYCHIATRY | Facility: CLINIC | Age: 29
End: 2024-10-24
Payer: MEDICAID

## 2024-10-24 RX ORDER — METOPROLOL TARTRATE 25 MG/1
12.5 TABLET, FILM COATED ORAL 2 TIMES DAILY
Qty: 90 TABLET | Refills: 3 | Status: SHIPPED | OUTPATIENT
Start: 2024-10-24

## 2024-10-24 NOTE — TELEPHONE ENCOUNTER
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  She was wondering if she could do a Mychart visit?    She was thinking she needed sertraline increased to 75 mg, because it is helping her anxiety, but she feels it could help more with an increase, and her PCP Jade Whitlock suggested her clonazepam be increased to 1 mg, because it is not really helping.  She only takes it when she has panic attacks, not every day.

## 2024-10-24 NOTE — TELEPHONE ENCOUNTER
I will start her on metoprolol tartrate 12.5 mg oral twice daily. New prescription sent to her pharmacy.

## 2024-10-24 NOTE — TELEPHONE ENCOUNTER
Spoke with the patient, she is to continue the Losartan, but if her systolic is under 110, she will hold that dose for that day. Pt voices understanding.

## 2024-10-24 NOTE — TELEPHONE ENCOUNTER
Heart rate has been getting up to 120-130 when she is walking or doing dishes. Yesterday she had to jog across the street and heart rate went to 164.  Resting heart rate 60-65. She was in office 10/1 and metoprolol was discontinued. She is on losartan now. She is wondering if she can take metoprolol again, but not the extended release?

## 2024-10-24 NOTE — TELEPHONE ENCOUNTER
Ok for video visit     If she is taking only PRN for panic attacks and not every day, she can take 2 tabs at the same time for now , she has 60 tabs per month

## 2024-11-07 ENCOUNTER — TELEMEDICINE (OUTPATIENT)
Dept: PSYCHIATRY | Facility: CLINIC | Age: 29
End: 2024-11-07

## 2024-11-07 DIAGNOSIS — F40.01 PANIC DISORDER WITH AGORAPHOBIA: Primary | Chronic | ICD-10-CM

## 2024-11-07 DIAGNOSIS — F43.10 POSTTRAUMATIC STRESS DISORDER: Chronic | ICD-10-CM

## 2024-11-07 DIAGNOSIS — F41.1 GENERALIZED ANXIETY DISORDER: Chronic | ICD-10-CM

## 2024-11-07 RX ORDER — CLONAZEPAM 1 MG/1
1 TABLET ORAL DAILY PRN
Qty: 30 TABLET | Refills: 3 | Status: SHIPPED | OUTPATIENT
Start: 2024-11-07
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
## 2024-11-17 DIAGNOSIS — F40.01 PANIC DISORDER WITH AGORAPHOBIA: Chronic | ICD-10-CM

## 2024-11-17 DIAGNOSIS — F43.10 POSTTRAUMATIC STRESS DISORDER: Chronic | ICD-10-CM

## 2024-11-17 DIAGNOSIS — F41.1 GENERALIZED ANXIETY DISORDER: Chronic | ICD-10-CM

## 2024-12-30 ENCOUNTER — TELEPHONE (OUTPATIENT)
Dept: CARDIOLOGY | Facility: CLINIC | Age: 29
End: 2024-12-30
Payer: COMMERCIAL

## 2024-12-30 ENCOUNTER — TELEPHONE (OUTPATIENT)
Dept: FAMILY MEDICINE CLINIC | Facility: CLINIC | Age: 29
End: 2024-12-30

## 2024-12-30 NOTE — TELEPHONE ENCOUNTER
Caller: Brandi Ash    Relationship to patient: Self    Best call back number: [phone]    Patient is needing: PT WANTS TO DISCUSS MEDICATIONS AS SHE IS POSSIBLY PREGNANT AND SAYS THAT LOSARTAN IS NOT GOOD FOR PREGNANCY. WANTS TO DISCUSS SOME ALTERNATIVES.

## 2024-12-30 NOTE — TELEPHONE ENCOUNTER
Requesting labs to be ordered prior to her appt next week, to check all her vitamin levels, glucose, A1C. Wants a full work up, as she had gastric sleeve done and is currently taking prenatals as well, trying to conceive.

## 2024-12-31 NOTE — TELEPHONE ENCOUNTER
Hub ok to relay:    Tell her vitamin levels need be checked thru her gastric surgeons office. They have dietician and NP that follow patient.

## 2024-12-31 NOTE — TELEPHONE ENCOUNTER
Tell her vitamin levels need be checked thru her gastric surgeons office. They have dietician and NP that follow patient.

## 2025-01-02 DIAGNOSIS — R73.01 IMPAIRED FASTING GLUCOSE: Primary | ICD-10-CM

## 2025-01-02 DIAGNOSIS — I15.9 SECONDARY HYPERTENSION: ICD-10-CM

## 2025-01-03 ENCOUNTER — PATIENT MESSAGE (OUTPATIENT)
Dept: CARDIOLOGY | Facility: CLINIC | Age: 30
End: 2025-01-03

## 2025-01-03 ENCOUNTER — TELEPHONE (OUTPATIENT)
Dept: BARIATRICS/WEIGHT MGMT | Facility: CLINIC | Age: 30
End: 2025-01-03

## 2025-01-03 ENCOUNTER — LAB (OUTPATIENT)
Dept: LAB | Facility: HOSPITAL | Age: 30
End: 2025-01-03
Payer: COMMERCIAL

## 2025-01-03 ENCOUNTER — TELEPHONE (OUTPATIENT)
Dept: CARDIOLOGY | Facility: CLINIC | Age: 30
End: 2025-01-03

## 2025-01-03 DIAGNOSIS — R73.01 IMPAIRED FASTING GLUCOSE: ICD-10-CM

## 2025-01-03 DIAGNOSIS — Z90.3 H/O GASTRIC SLEEVE: ICD-10-CM

## 2025-01-03 DIAGNOSIS — I15.9 SECONDARY HYPERTENSION: ICD-10-CM

## 2025-01-03 DIAGNOSIS — E66.812 OBESITY, CLASS II, BMI 35-39.9: ICD-10-CM

## 2025-01-03 LAB
25(OH)D3 SERPL-MCNC: 39.3 NG/ML (ref 30–100)
ALBUMIN SERPL-MCNC: 4.2 G/DL (ref 3.5–5.2)
ALBUMIN/GLOB SERPL: 1.6 G/DL
ALP SERPL-CCNC: 63 U/L (ref 39–117)
ALT SERPL W P-5'-P-CCNC: 17 U/L (ref 1–33)
ANION GAP SERPL CALCULATED.3IONS-SCNC: 10 MMOL/L (ref 5–15)
AST SERPL-CCNC: 14 U/L (ref 1–32)
BASOPHILS # BLD AUTO: 0.03 10*3/MM3 (ref 0–0.2)
BASOPHILS NFR BLD AUTO: 0.4 % (ref 0–1.5)
BILIRUB SERPL-MCNC: 0.4 MG/DL (ref 0–1.2)
BUN SERPL-MCNC: 17 MG/DL (ref 6–20)
BUN/CREAT SERPL: 25.4 (ref 7–25)
CALCIUM SPEC-SCNC: 9.3 MG/DL (ref 8.6–10.5)
CHLORIDE SERPL-SCNC: 106 MMOL/L (ref 98–107)
CHOLEST SERPL-MCNC: 151 MG/DL (ref 0–200)
CO2 SERPL-SCNC: 27 MMOL/L (ref 22–29)
CREAT SERPL-MCNC: 0.67 MG/DL (ref 0.57–1)
DEPRECATED RDW RBC AUTO: 40.7 FL (ref 37–54)
EGFRCR SERPLBLD CKD-EPI 2021: 120.8 ML/MIN/1.73
EOSINOPHIL # BLD AUTO: 0.06 10*3/MM3 (ref 0–0.4)
EOSINOPHIL NFR BLD AUTO: 0.7 % (ref 0.3–6.2)
ERYTHROCYTE [DISTWIDTH] IN BLOOD BY AUTOMATED COUNT: 13.2 % (ref 12.3–15.4)
FERRITIN SERPL-MCNC: 26.5 NG/ML (ref 13–150)
FOLATE SERPL-MCNC: 13.9 NG/ML (ref 4.78–24.2)
GLOBULIN UR ELPH-MCNC: 2.7 GM/DL
GLUCOSE SERPL-MCNC: 85 MG/DL (ref 65–99)
HBA1C MFR BLD: 5.3 % (ref 4.8–5.6)
HCT VFR BLD AUTO: 40 % (ref 34–46.6)
HDLC SERPL-MCNC: 61 MG/DL (ref 40–60)
HGB BLD-MCNC: 12.6 G/DL (ref 12–15.9)
IMM GRANULOCYTES # BLD AUTO: 0.02 10*3/MM3 (ref 0–0.05)
IMM GRANULOCYTES NFR BLD AUTO: 0.2 % (ref 0–0.5)
IRON 24H UR-MRATE: 77 MCG/DL (ref 37–145)
IRON SATN MFR SERPL: 21 % (ref 20–50)
LDLC SERPL CALC-MCNC: 80 MG/DL (ref 0–100)
LDLC/HDLC SERPL: 1.32 {RATIO}
LYMPHOCYTES # BLD AUTO: 3.23 10*3/MM3 (ref 0.7–3.1)
LYMPHOCYTES NFR BLD AUTO: 40 % (ref 19.6–45.3)
MAGNESIUM SERPL-MCNC: 2.1 MG/DL (ref 1.6–2.6)
MCH RBC QN AUTO: 27.2 PG (ref 26.6–33)
MCHC RBC AUTO-ENTMCNC: 31.5 G/DL (ref 31.5–35.7)
MCV RBC AUTO: 86.2 FL (ref 79–97)
MONOCYTES # BLD AUTO: 0.47 10*3/MM3 (ref 0.1–0.9)
MONOCYTES NFR BLD AUTO: 5.8 % (ref 5–12)
NEUTROPHILS NFR BLD AUTO: 4.27 10*3/MM3 (ref 1.7–7)
NEUTROPHILS NFR BLD AUTO: 52.9 % (ref 42.7–76)
NRBC BLD AUTO-RTO: 0 /100 WBC (ref 0–0.2)
PHOSPHATE SERPL-MCNC: 2.6 MG/DL (ref 2.5–4.5)
PLATELET # BLD AUTO: 261 10*3/MM3 (ref 140–450)
PMV BLD AUTO: 11 FL (ref 6–12)
POTASSIUM SERPL-SCNC: 3.9 MMOL/L (ref 3.5–5.2)
PREALB SERPL-MCNC: 18.4 MG/DL (ref 20–40)
PROT SERPL-MCNC: 6.9 G/DL (ref 6–8.5)
PTH-INTACT SERPL-MCNC: 19.9 PG/ML (ref 15–65)
RBC # BLD AUTO: 4.64 10*6/MM3 (ref 3.77–5.28)
SODIUM SERPL-SCNC: 143 MMOL/L (ref 136–145)
TIBC SERPL-MCNC: 361 MCG/DL (ref 298–536)
TRANSFERRIN SERPL-MCNC: 242 MG/DL (ref 200–360)
TRIGL SERPL-MCNC: 47 MG/DL (ref 0–150)
VLDLC SERPL-MCNC: 10 MG/DL (ref 5–40)
WBC NRBC COR # BLD AUTO: 8.08 10*3/MM3 (ref 3.4–10.8)

## 2025-01-03 PROCEDURE — 80053 COMPREHEN METABOLIC PANEL: CPT

## 2025-01-03 PROCEDURE — 83921 ORGANIC ACID SINGLE QUANT: CPT

## 2025-01-03 PROCEDURE — 82746 ASSAY OF FOLIC ACID SERUM: CPT

## 2025-01-03 PROCEDURE — 84466 ASSAY OF TRANSFERRIN: CPT

## 2025-01-03 PROCEDURE — 84446 ASSAY OF VITAMIN E: CPT

## 2025-01-03 PROCEDURE — 83970 ASSAY OF PARATHORMONE: CPT

## 2025-01-03 PROCEDURE — 83036 HEMOGLOBIN GLYCOSYLATED A1C: CPT

## 2025-01-03 PROCEDURE — 36415 COLL VENOUS BLD VENIPUNCTURE: CPT

## 2025-01-03 PROCEDURE — 83540 ASSAY OF IRON: CPT

## 2025-01-03 PROCEDURE — 84630 ASSAY OF ZINC: CPT

## 2025-01-03 PROCEDURE — 80061 LIPID PANEL: CPT

## 2025-01-03 PROCEDURE — 84590 ASSAY OF VITAMIN A: CPT

## 2025-01-03 PROCEDURE — 85025 COMPLETE CBC W/AUTO DIFF WBC: CPT

## 2025-01-03 PROCEDURE — 82728 ASSAY OF FERRITIN: CPT

## 2025-01-03 PROCEDURE — 84597 ASSAY OF VITAMIN K: CPT

## 2025-01-03 PROCEDURE — 84134 ASSAY OF PREALBUMIN: CPT

## 2025-01-03 PROCEDURE — 83735 ASSAY OF MAGNESIUM: CPT

## 2025-01-03 PROCEDURE — 82306 VITAMIN D 25 HYDROXY: CPT

## 2025-01-03 PROCEDURE — 84100 ASSAY OF PHOSPHORUS: CPT

## 2025-01-03 RX ORDER — NIFEDIPINE 30 MG/1
30 TABLET, EXTENDED RELEASE ORAL DAILY
Qty: 90 TABLET | Refills: 3 | Status: SHIPPED | OUTPATIENT
Start: 2025-01-03

## 2025-01-03 RX ORDER — LABETALOL 100 MG/1
100 TABLET, FILM COATED ORAL 2 TIMES DAILY
Qty: 180 TABLET | Refills: 3 | Status: SHIPPED | OUTPATIENT
Start: 2025-01-03 | End: 2025-01-03 | Stop reason: CLARIF

## 2025-01-03 NOTE — TELEPHONE ENCOUNTER
Since she is trying to conceive and may already be pregnant, I will discontinue losartan and metoprolol. She will be started on labetalol 100 mg twice daily. New prescription sent to her pharmacy. She should monitor her blood pressure twice daily, 1 hour after taking her medication and keep a log. She should report back to us after 1 week with her log. She can send it in via [employer].

## 2025-01-03 NOTE — TELEPHONE ENCOUNTER
PT HAD QUESTIONS REGARDING LAB RESULTS.     MA CALLED PT. PT DID NOT ANSWER. [facility]

## 2025-01-03 NOTE — TELEPHONE ENCOUNTER
Patient is concerned about bradycardia with labetalol. I will start her on nifedipine XL 30 mg daily. Message sent to patient via [employer] to inform her of this change.

## 2025-01-06 LAB — METHYLMALONATE SERPL-SCNC: 168 NMOL/L (ref 0–378)

## 2025-01-07 LAB
A-TOCOPHEROL VIT E SERPL-MCNC: 9.7 MG/L (ref 5.9–19.4)
GAMMA TOCOPHEROL SERPL-MCNC: 1.2 MG/L (ref 0.7–4.9)
ZINC SERPL-MCNC: 75 UG/DL (ref 44–115)

## 2025-01-08 LAB — VIT A SERPL-MCNC: 34 UG/DL (ref 18.9–57.3)

## 2025-01-09 LAB — PHYTONADIONE SERPL-MCNC: 0.24 NG/ML (ref 0.1–2.2)

## 2025-01-10 ENCOUNTER — TELEPHONE (OUTPATIENT)
Dept: PSYCHIATRY | Facility: CLINIC | Age: 30
End: 2025-01-10
Payer: COMMERCIAL

## 2025-01-10 ENCOUNTER — OFFICE VISIT (OUTPATIENT)
Dept: FAMILY MEDICINE CLINIC | Facility: CLINIC | Age: 30
End: 2025-01-10
Payer: COMMERCIAL

## 2025-01-10 VITALS
RESPIRATION RATE: 15 BRPM | BODY MASS INDEX: 32.95 KG/M2 | HEIGHT: 68 IN | TEMPERATURE: 98 F | OXYGEN SATURATION: 96 % | HEART RATE: 87 BPM | DIASTOLIC BLOOD PRESSURE: 70 MMHG | SYSTOLIC BLOOD PRESSURE: 100 MMHG | WEIGHT: 217.4 LBS

## 2025-01-10 DIAGNOSIS — F43.10 POSTTRAUMATIC STRESS DISORDER: Chronic | ICD-10-CM

## 2025-01-10 DIAGNOSIS — F40.01 PANIC DISORDER WITH AGORAPHOBIA: Chronic | ICD-10-CM

## 2025-01-10 DIAGNOSIS — Z32.00 POSSIBLE PREGNANCY: ICD-10-CM

## 2025-01-10 DIAGNOSIS — U07.0 VAPING-RELATED DISORDER: ICD-10-CM

## 2025-01-10 DIAGNOSIS — I10 PRIMARY HYPERTENSION: Primary | ICD-10-CM

## 2025-01-10 DIAGNOSIS — F41.1 GENERALIZED ANXIETY DISORDER: Chronic | ICD-10-CM

## 2025-01-10 DIAGNOSIS — R39.9 UTI SYMPTOMS: ICD-10-CM

## 2025-01-10 LAB
B-HCG UR QL: POSITIVE
BILIRUB BLD-MCNC: ABNORMAL MG/DL
CLARITY, POC: CLEAR
COLOR UR: ABNORMAL
EXPIRATION DATE: ABNORMAL
EXPIRATION DATE: ABNORMAL
GLUCOSE UR STRIP-MCNC: NEGATIVE MG/DL
INTERNAL NEGATIVE CONTROL: ABNORMAL
INTERNAL POSITIVE CONTROL: ABNORMAL
KETONES UR QL: NEGATIVE
LEUKOCYTE EST, POC: NEGATIVE
Lab: ABNORMAL
Lab: ABNORMAL
NITRITE UR-MCNC: NEGATIVE MG/ML
PH UR: 6 [PH] (ref 5–8)
PROT UR STRIP-MCNC: NEGATIVE MG/DL
RBC # UR STRIP: NEGATIVE /UL
SP GR UR: 1.03 (ref 1–1.03)
UROBILINOGEN UR QL: ABNORMAL

## 2025-01-10 RX ORDER — PRENATAL VIT/IRON FUM/FOLIC AC 27MG-0.8MG
TABLET ORAL DAILY
COMMUNITY

## 2025-01-10 NOTE — PROGRESS NOTES
Subjective        Brandi Ash is a 29 y.o. female.     Chief Complaint   Patient presents with    Vaginitis     UC did not give Diflucan due to possible pregnancy     Urinary Tract Infection    Possible Pregnancy     Wants to know if clonazepam is safe for pregnancy       Vaginitis  Urinary Tract Infection   Associated symptoms include a possible pregnancy.   Possible Pregnancy    Patient is here for concerns she is pregnant and thinks she has Uti.   LMP 12/13/2024. GR1 Para 1 ABO neg.  She had positive pregnancy test at home.   No birthcontrol.   Urine dip neg for uti.   Recent gastric sleeve having increased indigestion and now vomiting but is also pregnant.     Hypertension: she had to stop losartan she had stop metoprolol . She is ordered nifedipine to start but has not started yet. She is reporting to have lower blood pressure since she had her gastric sleeve.     Gastric sleeve -followed by dietician . Recent labs found to have low albumin. Discussed need for increased protein because of her pregnancy. She is drinking 30 g protein in am.     Generalized anxiety disorder; she is taking clonazepam as needed told her to stop taking this and talk to psychiatry about other option she is also on zoloft 75 mg daily.     Vaping she did stop 1/8/2025 . Discussed harmful effects on pregnancy.     The following portions of the patient's history were reviewed and updated as appropriate: allergies, current medications, past family history, past medical history, past social history, past surgical history and problem list.      Current Outpatient Medications:     clonazePAM (KlonoPIN) 1 MG tablet, Take 1 tablet by mouth Daily As Needed for Anxiety. for anxiety, Disp: 30 tablet, Rfl: 3    clotrimazole-betamethasone (LOTRISONE) 1-0.05 % cream, Apply 1 Application topically to the appropriate area as directed 2 (Two) Times a Day., Disp: 45 g, Rfl: 1    NIFEdipine XL (PROCARDIA XL) 30 MG 24 hr tablet, Take 1 tablet by  mouth Daily., Disp: 90 tablet, Rfl: 3    Prenatal Vit-Fe Fumarate-FA (prenatal vitamin 27-0.8) 27-0.8 MG tablet tablet, Take  by mouth Daily., Disp: , Rfl:     Probiotic Product (JARRO-DOPHILUS VAGINAL PROBIOT PO), Take  by mouth., Disp: , Rfl:     sertraline (Zoloft) 50 MG tablet, Take 1.5 tablets by mouth Daily., Disp: 45 tablet, Rfl: 3    multivitamin with minerals tablet tablet, Take 1 tablet by mouth Daily. (Patient not taking: Reported on 1/10/2025), Disp: , Rfl:     omeprazole (priLOSEC) 40 MG capsule, Take 1 capsule by mouth Daily. (Patient not taking: Reported on 1/10/2025), Disp: 30 capsule, Rfl: 6    Recent Results (from the past 24 weeks)   Basic Metabolic Panel    Collection Time: 08/28/24 11:25 AM    Specimen: Blood   Result Value Ref Range    Glucose 92 65 - 99 mg/dL    BUN 12 6 - 20 mg/dL    Creatinine 0.67 0.57 - 1.00 mg/dL    Sodium 141 136 - 145 mmol/L    Potassium 4.2 3.5 - 5.2 mmol/L    Chloride 106 98 - 107 mmol/L    CO2 24.3 22.0 - 29.0 mmol/L    Calcium 9.6 8.6 - 10.5 mg/dL    BUN/Creatinine Ratio 17.9 7.0 - 25.0    Anion Gap 10.7 5.0 - 15.0 mmol/L    eGFR 120.8 >60.0 mL/min/1.73   Magnesium    Collection Time: 08/28/24 11:25 AM    Specimen: Blood   Result Value Ref Range    Magnesium 2.5 1.6 - 2.6 mg/dL   CBC Auto Differential    Collection Time: 08/28/24 11:25 AM    Specimen: Blood   Result Value Ref Range    WBC 7.18 3.40 - 10.80 10*3/mm3    RBC 4.51 3.77 - 5.28 10*6/mm3    Hemoglobin 12.1 12.0 - 15.9 g/dL    Hematocrit 38.3 34.0 - 46.6 %    MCV 84.9 79.0 - 97.0 fL    MCH 26.8 26.6 - 33.0 pg    MCHC 31.6 31.5 - 35.7 g/dL    RDW 13.2 12.3 - 15.4 %    RDW-SD 41.1 37.0 - 54.0 fl    MPV 11.3 6.0 - 12.0 fL    Platelets 250 140 - 450 10*3/mm3    Neutrophil % 54.3 42.7 - 76.0 %    Lymphocyte % 39.3 19.6 - 45.3 %    Monocyte % 5.2 5.0 - 12.0 %    Eosinophil % 0.7 0.3 - 6.2 %    Basophil % 0.4 0.0 - 1.5 %    Immature Grans % 0.1 0.0 - 0.5 %    Neutrophils, Absolute 3.90 1.70 - 7.00 10*3/mm3     Lymphocytes, Absolute 2.82 0.70 - 3.10 10*3/mm3    Monocytes, Absolute 0.37 0.10 - 0.90 10*3/mm3    Eosinophils, Absolute 0.05 0.00 - 0.40 10*3/mm3    Basophils, Absolute 0.03 0.00 - 0.20 10*3/mm3    Immature Grans, Absolute 0.01 0.00 - 0.05 10*3/mm3    nRBC 0.0 0.0 - 0.2 /100 WBC   Gold Top - SST    Collection Time: 08/28/24 11:25 AM   Result Value Ref Range    Extra Tube Hold for add-ons.    Lipid Panel    Collection Time: 08/28/24 11:25 AM    Specimen: Blood   Result Value Ref Range    Total Cholesterol 166 0 - 200 mg/dL    Triglycerides 99 0 - 150 mg/dL    HDL Cholesterol 52 40 - 60 mg/dL    LDL Cholesterol  96 0 - 100 mg/dL    VLDL Cholesterol 18 5 - 40 mg/dL    LDL/HDL Ratio 1.81    TSH Rfx On Abnormal To Free T4    Collection Time: 08/28/24 11:25 AM    Specimen: Blood   Result Value Ref Range    TSH 0.749 0.270 - 4.200 uIU/mL   ECG 12 Lead Dyspnea    Collection Time: 08/28/24 11:26 AM   Result Value Ref Range    QT Interval 417 ms    QTC Interval 421 ms   POCT URINALYSIS DIPSTICK, AUTOMATED    Collection Time: 12/27/24  6:57 PM    Specimen: Urine    Specimen type and source: Urine, Urine specimen (specimen)   Result Value Ref Range    Color, UA Yellow     Appearance, Fluid Clear Clear, Slightly Cloudy    Specific Gravity, UA 1.025 1.000 - 1.030    pH, UA 5 5 - 8    Leukocytes, UA 75 Karen/ul (A) Negative    Nitrite, UA Negative Negative    Total Protein, urine Negative Negative, Trace    Glucose, UA Normal Normal    External Ketones, Urine Negative Negative    Urobilinogen, UA Normal Normal mg/dL    External Bilirubin, Urine Negative Negative    Blood, UA Negative Negative    QC Acceptable Acceptable    Lot Number [id number]     Expiration Date 11/30/2025     Comment     Hemoglobin A1c    Collection Time: 01/03/25  6:36 AM    Specimen: Blood   Result Value Ref Range    Hemoglobin A1C 5.30 4.80 - 5.60 %   Ferritin    Collection Time: 01/03/25  6:36 AM    Specimen: Blood   Result Value Ref Range    Ferritin 26.50  13.00 - 150.00 ng/mL   Folate    Collection Time: 01/03/25  6:36 AM    Specimen: Blood   Result Value Ref Range    Folate 13.90 4.78 - 24.20 ng/mL   Magnesium    Collection Time: 01/03/25  6:36 AM    Specimen: Blood   Result Value Ref Range    Magnesium 2.1 1.6 - 2.6 mg/dL   Methylmalonic Acid, Serum    Collection Time: 01/03/25  6:36 AM    Specimen: Blood   Result Value Ref Range    Methylmalonic Acid 168 0 - 378 nmol/L   Prealbumin    Collection Time: 01/03/25  6:36 AM    Specimen: Blood   Result Value Ref Range    Prealbumin 18.4 (L) 20.0 - 40.0 mg/dL   Comprehensive Metabolic Panel    Collection Time: 01/03/25  6:36 AM    Specimen: Blood   Result Value Ref Range    Glucose 85 65 - 99 mg/dL    BUN 17 6 - 20 mg/dL    Creatinine 0.67 0.57 - 1.00 mg/dL    Sodium 143 136 - 145 mmol/L    Potassium 3.9 3.5 - 5.2 mmol/L    Chloride 106 98 - 107 mmol/L    CO2 27.0 22.0 - 29.0 mmol/L    Calcium 9.3 8.6 - 10.5 mg/dL    Total Protein 6.9 6.0 - 8.5 g/dL    Albumin 4.2 3.5 - 5.2 g/dL    ALT (SGPT) 17 1 - 33 U/L    AST (SGOT) 14 1 - 32 U/L    Alkaline Phosphatase 63 39 - 117 U/L    Total Bilirubin 0.4 0.0 - 1.2 mg/dL    Globulin 2.7 gm/dL    A/G Ratio 1.6 g/dL    BUN/Creatinine Ratio 25.4 (H) 7.0 - 25.0    Anion Gap 10.0 5.0 - 15.0 mmol/L    eGFR 120.8 >60.0 mL/min/1.73   Phosphorus    Collection Time: 01/03/25  6:36 AM    Specimen: Blood   Result Value Ref Range    Phosphorus 2.6 2.5 - 4.5 mg/dL   PTH, Intact    Collection Time: 01/03/25  6:36 AM    Specimen: Blood   Result Value Ref Range    PTH, Intact 19.9 15.0 - 65.0 pg/mL   Vitamin A    Collection Time: 01/03/25  6:36 AM    Specimen: Blood   Result Value Ref Range    Vitamin A 34.0 18.9 - 57.3 ug/dL   Vitamin E    Collection Time: 01/03/25  6:36 AM    Specimen: Blood   Result Value Ref Range    Vitamin E (Alpha Tocopherol) 9.7 5.9 - 19.4 mg/L    Vitamin E (Gamma Tocopherol) 1.2 0.7 - 4.9 mg/L   Vitamin K1    Collection Time: 01/03/25  6:36 AM    Specimen: Blood   Result  Value Ref Range    Vitamin K 0.24 0.10 - 2.20 ng/mL   Zinc    Collection Time: 01/03/25  6:36 AM    Specimen: Blood   Result Value Ref Range    Zinc 75 44 - 115 ug/dL   Iron Profile    Collection Time: 01/03/25  6:36 AM    Specimen: Blood   Result Value Ref Range    Iron 77 37 - 145 mcg/dL    Iron Saturation (TSAT) 21 20 - 50 %    Transferrin 242 200 - 360 mg/dL    TIBC 361 298 - 536 mcg/dL   Vitamin D,25-Hydroxy    Collection Time: 01/03/25  6:36 AM    Specimen: Blood   Result Value Ref Range    25 Hydroxy, Vitamin D 39.3 30.0 - 100.0 ng/ml   Lipid Panel    Collection Time: 01/03/25  6:36 AM    Specimen: Blood   Result Value Ref Range    Total Cholesterol 151 0 - 200 mg/dL    Triglycerides 47 0 - 150 mg/dL    HDL Cholesterol 61 (H) 40 - 60 mg/dL    LDL Cholesterol  80 0 - 100 mg/dL    VLDL Cholesterol 10 5 - 40 mg/dL    LDL/HDL Ratio 1.32    CBC Auto Differential    Collection Time: 01/03/25  6:36 AM    Specimen: Blood   Result Value Ref Range    WBC 8.08 3.40 - 10.80 10*3/mm3    RBC 4.64 3.77 - 5.28 10*6/mm3    Hemoglobin 12.6 12.0 - 15.9 g/dL    Hematocrit 40.0 34.0 - 46.6 %    MCV 86.2 79.0 - 97.0 fL    MCH 27.2 26.6 - 33.0 pg    MCHC 31.5 31.5 - 35.7 g/dL    RDW 13.2 12.3 - 15.4 %    RDW-SD 40.7 37.0 - 54.0 fl    MPV 11.0 6.0 - 12.0 fL    Platelets 261 140 - 450 10*3/mm3    Neutrophil % 52.9 42.7 - 76.0 %    Lymphocyte % 40.0 19.6 - 45.3 %    Monocyte % 5.8 5.0 - 12.0 %    Eosinophil % 0.7 0.3 - 6.2 %    Basophil % 0.4 0.0 - 1.5 %    Immature Grans % 0.2 0.0 - 0.5 %    Neutrophils, Absolute 4.27 1.70 - 7.00 10*3/mm3    Lymphocytes, Absolute 3.23 (H) 0.70 - 3.10 10*3/mm3    Monocytes, Absolute 0.47 0.10 - 0.90 10*3/mm3    Eosinophils, Absolute 0.06 0.00 - 0.40 10*3/mm3    Basophils, Absolute 0.03 0.00 - 0.20 10*3/mm3    Immature Grans, Absolute 0.02 0.00 - 0.05 10*3/mm3    nRBC 0.0 0.0 - 0.2 /100 WBC   POCT pregnancy, urine    Collection Time: 01/10/25  7:48 AM    Specimen: Urine   Result Value Ref Range    HCG,  "Urine, QL Positive (A) Negative    Lot Number [id number]     Internal Positive Control Passed Positive, Passed    Internal Negative Control Passed Negative, Passed    Expiration Date 1,282,025    POCT urinalysis dipstick, automated    Collection Time: 01/10/25  7:49 AM    Specimen: Urine   Result Value Ref Range    Color Dark Yellow Yellow, Straw, Dark Yellow, Zee    Clarity, UA Clear Clear    Specific Gravity  1.030 1.005 - 1.030    pH, Urine 6.0 5.0 - 8.0    Leukocytes Negative Negative    Nitrite, UA Negative Negative    Protein, POC Negative Negative mg/dL    Glucose, UA Negative Negative mg/dL    Ketones, UA Negative Negative    Urobilinogen, UA 0.2 E.U./dL Normal, 0.2 E.U./dL    Bilirubin Small (1+) (A) Negative    Blood, UA Negative Negative    Lot Number [id number]     Expiration Date 4,302,025          Review of Systems    Objective     /70   Pulse 87   Temp 98 °F (36.7 °C) (Oral)   Resp 15   Ht 172.7 cm (68\")   Wt 98.6 kg (217 lb 6.4 oz)   SpO2 96%   BMI 33.06 kg/m²     Physical Exam  Vitals and nursing note reviewed.   Constitutional:       Appearance: She is obese.   HENT:      Head: Normocephalic.      Right Ear: Tympanic membrane normal.      Left Ear: Tympanic membrane normal.      Nose: Nose normal.      Mouth/Throat:      Mouth: Mucous membranes are moist.   Eyes:      Pupils: Pupils are equal, round, and reactive to light.   Cardiovascular:      Rate and Rhythm: Normal rate and regular rhythm.      Pulses: Normal pulses.      Heart sounds: Normal heart sounds.   Pulmonary:      Breath sounds: Normal breath sounds.   Abdominal:      General: Abdomen is flat.   Skin:     General: Skin is warm.   Neurological:      General: No focal deficit present.      Mental Status: She is alert and oriented to person, place, and time.   Psychiatric:         Mood and Affect: Mood normal.         Behavior: Behavior normal.         Thought Content: Thought content normal.         Result Review :            "     Assessment & Plan    Diagnoses and all orders for this visit:    1. Primary hypertension (Primary)  Comments:  having low symptoms as reported hypotension.    2. UTI symptoms  Comments:  neg in office today.  Orders:  -     POCT urinalysis dipstick, automated    3. Possible pregnancy  Comments:  positive pregnancy test in office.  Orders:  -     POCT pregnancy, urine    4. Vaping-related disorder  Comments:  reported she stopped on 1/8/2025  Assessment & Plan:  Stopped vaping 1/8/2025      5. Generalized anxiety disorder  Comments:  due to pregnancy told stop taking clonazepam      Patient Instructions   Drink fluids take your vitamins   Use tums for indigestion   Talk with gyn about your nausea.   Monitor blood pressure am and pm write it down   Don't take the nifedipine when you are having symp[toms of syncope when standing.   Use netti pott to clean out your nose and help sinus congestion.   Call gyn and schedule appointment      Follow Up   Return in about 6 months (around 7/10/2025).    Patient was given instructions and counseling regarding her condition or for health maintenance advice. Please see specific information pulled into the AVS if appropriate.     Jade Whitlock, APRN    01/10/25

## 2025-01-10 NOTE — PATIENT INSTRUCTIONS
Drink fluids take your vitamins   Use tums for indigestion   Talk with gyn about your nausea.   Monitor blood pressure am and pm write it down   Don't take the nifedipine when you are having symp[toms of syncope when standing.   Use netti pott to clean out your nose and help sinus congestion.   Call gyn and schedule appointment

## 2025-01-10 NOTE — TELEPHONE ENCOUNTER
Pt says she is pregnant and her PCP NP told her Klonopin shouldn't be taken during pregnancy, and that she would benefit from increasing her Zoloft to help with anxiety after d/c'ing Klonopin.      I told pt you are gone for the day and out of the office on Monday, so she will also call her OB/GYN for advice about weaning if it can harm the baby.      She says she only takes Klonopin 1-2 times a week, so she is not worried about W/D or needing it before your reply, but I also advised the ER for any W/D sxs.    Please advise.

## 2025-01-14 RX ORDER — SERTRALINE HYDROCHLORIDE 100 MG/1
100 TABLET, FILM COATED ORAL DAILY
Qty: 30 TABLET | Refills: 3 | Status: SHIPPED | OUTPATIENT
Start: 2025-01-14 | End: 2026-01-14

## 2025-01-14 NOTE — TELEPHONE ENCOUNTER
If the pt  was taking 1-2 times per week, no need to taper    Zoloft was increased to 100 mg and new rx was sent to the pharmacy

## 2025-01-17 ENCOUNTER — TRANSCRIBE ORDERS (OUTPATIENT)
Dept: LAB | Facility: HOSPITAL | Age: 30
End: 2025-01-17
Payer: COMMERCIAL

## 2025-01-17 ENCOUNTER — LAB (OUTPATIENT)
Dept: LAB | Facility: HOSPITAL | Age: 30
End: 2025-01-17
Payer: COMMERCIAL

## 2025-01-17 DIAGNOSIS — Z34.81 PRENATAL CARE, SUBSEQUENT PREGNANCY, FIRST TRIMESTER: Primary | ICD-10-CM

## 2025-01-17 DIAGNOSIS — Z34.81 PRENATAL CARE, SUBSEQUENT PREGNANCY, FIRST TRIMESTER: ICD-10-CM

## 2025-01-19 ENCOUNTER — HOSPITAL ENCOUNTER (EMERGENCY)
Facility: HOSPITAL | Age: 30
End: 2025-01-19
Payer: COMMERCIAL

## 2025-01-19 ENCOUNTER — LAB (OUTPATIENT)
Dept: LAB | Facility: HOSPITAL | Age: 30
End: 2025-01-19
Payer: COMMERCIAL

## 2025-01-19 DIAGNOSIS — Z34.81 PRENATAL CARE, SUBSEQUENT PREGNANCY, FIRST TRIMESTER: ICD-10-CM

## 2025-01-19 LAB — HCG INTACT+B SERPL-ACNC: 2472 MIU/ML

## 2025-01-19 PROCEDURE — 36415 COLL VENOUS BLD VENIPUNCTURE: CPT

## 2025-01-19 PROCEDURE — 84702 CHORIONIC GONADOTROPIN TEST: CPT

## 2025-01-20 DIAGNOSIS — R10.31 ABDOMINAL PAIN, RIGHT LOWER QUADRANT: Primary | ICD-10-CM

## 2025-01-21 ENCOUNTER — LAB (OUTPATIENT)
Dept: LAB | Facility: HOSPITAL | Age: 30
End: 2025-01-21
Payer: COMMERCIAL

## 2025-01-21 DIAGNOSIS — R10.31 ABDOMINAL PAIN, RIGHT LOWER QUADRANT: Primary | ICD-10-CM

## 2025-01-21 LAB
ABO GROUP BLD: NORMAL
BASOPHILS # BLD AUTO: 0.03 10*3/MM3 (ref 0–0.2)
BASOPHILS NFR BLD AUTO: 0.5 % (ref 0–1.5)
DEPRECATED RDW RBC AUTO: 40.1 FL (ref 37–54)
EOSINOPHIL # BLD AUTO: 0.06 10*3/MM3 (ref 0–0.4)
EOSINOPHIL NFR BLD AUTO: 0.9 % (ref 0.3–6.2)
ERYTHROCYTE [DISTWIDTH] IN BLOOD BY AUTOMATED COUNT: 12.8 % (ref 12.3–15.4)
HCG INTACT+B SERPL-ACNC: 3861 MIU/ML
HCT VFR BLD AUTO: 39.2 % (ref 34–46.6)
HGB BLD-MCNC: 12.5 G/DL (ref 12–15.9)
IMM GRANULOCYTES # BLD AUTO: 0.01 10*3/MM3 (ref 0–0.05)
IMM GRANULOCYTES NFR BLD AUTO: 0.2 % (ref 0–0.5)
LYMPHOCYTES # BLD AUTO: 2.62 10*3/MM3 (ref 0.7–3.1)
LYMPHOCYTES NFR BLD AUTO: 41.2 % (ref 19.6–45.3)
MCH RBC QN AUTO: 27.4 PG (ref 26.6–33)
MCHC RBC AUTO-ENTMCNC: 31.9 G/DL (ref 31.5–35.7)
MCV RBC AUTO: 86 FL (ref 79–97)
MONOCYTES # BLD AUTO: 0.37 10*3/MM3 (ref 0.1–0.9)
MONOCYTES NFR BLD AUTO: 5.8 % (ref 5–12)
NEUTROPHILS NFR BLD AUTO: 3.27 10*3/MM3 (ref 1.7–7)
NEUTROPHILS NFR BLD AUTO: 51.4 % (ref 42.7–76)
NRBC BLD AUTO-RTO: 0 /100 WBC (ref 0–0.2)
PLATELET # BLD AUTO: 234 10*3/MM3 (ref 140–450)
PMV BLD AUTO: 10.7 FL (ref 6–12)
RBC # BLD AUTO: 4.56 10*6/MM3 (ref 3.77–5.28)
RH BLD: POSITIVE
WBC NRBC COR # BLD AUTO: 6.36 10*3/MM3 (ref 3.4–10.8)

## 2025-01-21 PROCEDURE — 86900 BLOOD TYPING SEROLOGIC ABO: CPT

## 2025-01-21 PROCEDURE — 36415 COLL VENOUS BLD VENIPUNCTURE: CPT

## 2025-01-21 PROCEDURE — 85025 COMPLETE CBC W/AUTO DIFF WBC: CPT

## 2025-01-21 PROCEDURE — 84702 CHORIONIC GONADOTROPIN TEST: CPT

## 2025-01-21 PROCEDURE — 86901 BLOOD TYPING SEROLOGIC RH(D): CPT

## 2025-02-20 ENCOUNTER — APPOINTMENT (OUTPATIENT)
Dept: CT IMAGING | Facility: HOSPITAL | Age: 30
End: 2025-02-20
Payer: COMMERCIAL

## 2025-02-20 ENCOUNTER — APPOINTMENT (OUTPATIENT)
Dept: ULTRASOUND IMAGING | Facility: HOSPITAL | Age: 30
End: 2025-02-20
Payer: COMMERCIAL

## 2025-02-20 ENCOUNTER — HOSPITAL ENCOUNTER (EMERGENCY)
Facility: HOSPITAL | Age: 30
Discharge: HOME | End: 2025-02-20
Payer: COMMERCIAL

## 2025-02-20 VITALS
HEART RATE: 64 BPM | BODY MASS INDEX: 32.43 KG/M2 | TEMPERATURE: 98.1 F | OXYGEN SATURATION: 100 % | WEIGHT: 214 LBS | DIASTOLIC BLOOD PRESSURE: 84 MMHG | RESPIRATION RATE: 18 BRPM | SYSTOLIC BLOOD PRESSURE: 133 MMHG | HEIGHT: 68 IN

## 2025-02-20 DIAGNOSIS — N93.9 VAGINAL BLEEDING: ICD-10-CM

## 2025-02-20 DIAGNOSIS — R55 SYNCOPE, UNSPECIFIED SYNCOPE TYPE: Primary | ICD-10-CM

## 2025-02-20 LAB
ABO GROUP BLD: NORMAL
ALBUMIN SERPL-MCNC: 4.8 G/DL (ref 3.5–5.2)
ALBUMIN/GLOB SERPL: 1.5 G/DL
ALP SERPL-CCNC: 70 U/L (ref 39–117)
ALT SERPL W P-5'-P-CCNC: 21 U/L (ref 1–33)
ANION GAP SERPL CALCULATED.3IONS-SCNC: 11.9 MMOL/L (ref 5–15)
AST SERPL-CCNC: 18 U/L (ref 1–32)
BACTERIA UR QL AUTO: ABNORMAL /HPF
BASOPHILS # BLD AUTO: 0.04 10*3/MM3 (ref 0–0.2)
BASOPHILS NFR BLD AUTO: 0.4 % (ref 0–1.5)
BILIRUB SERPL-MCNC: 0.5 MG/DL (ref 0–1.2)
BILIRUB UR QL STRIP: NEGATIVE
BLD GP AB SCN SERPL QL: NEGATIVE
BUN SERPL-MCNC: 17 MG/DL (ref 6–20)
BUN/CREAT SERPL: 27 (ref 7–25)
CALCIUM SPEC-SCNC: 10 MG/DL (ref 8.6–10.5)
CHLORIDE SERPL-SCNC: 102 MMOL/L (ref 98–107)
CLARITY UR: CLEAR
CO2 SERPL-SCNC: 25.1 MMOL/L (ref 22–29)
COLOR UR: ABNORMAL
CREAT SERPL-MCNC: 0.63 MG/DL (ref 0.57–1)
DEPRECATED RDW RBC AUTO: 37.7 FL (ref 37–54)
EGFRCR SERPLBLD CKD-EPI 2021: 122.6 ML/MIN/1.73
EOSINOPHIL # BLD AUTO: 0.09 10*3/MM3 (ref 0–0.4)
EOSINOPHIL NFR BLD AUTO: 1 % (ref 0.3–6.2)
ERYTHROCYTE [DISTWIDTH] IN BLOOD BY AUTOMATED COUNT: 12.3 % (ref 12.3–15.4)
GLOBULIN UR ELPH-MCNC: 3.1 GM/DL
GLUCOSE SERPL-MCNC: 95 MG/DL (ref 65–99)
GLUCOSE UR STRIP-MCNC: NEGATIVE MG/DL
HCG INTACT+B SERPL-ACNC: 2233 MIU/ML
HCT VFR BLD AUTO: 40.1 % (ref 34–46.6)
HGB BLD-MCNC: 13.1 G/DL (ref 12–15.9)
HGB UR QL STRIP.AUTO: ABNORMAL
HYALINE CASTS UR QL AUTO: ABNORMAL /LPF
IMM GRANULOCYTES # BLD AUTO: 0.01 10*3/MM3 (ref 0–0.05)
IMM GRANULOCYTES NFR BLD AUTO: 0.1 % (ref 0–0.5)
KETONES UR QL STRIP: NEGATIVE
LEUKOCYTE ESTERASE UR QL STRIP.AUTO: ABNORMAL
LYMPHOCYTES # BLD AUTO: 3.12 10*3/MM3 (ref 0.7–3.1)
LYMPHOCYTES NFR BLD AUTO: 34.8 % (ref 19.6–45.3)
MCH RBC QN AUTO: 27.5 PG (ref 26.6–33)
MCHC RBC AUTO-ENTMCNC: 32.7 G/DL (ref 31.5–35.7)
MCV RBC AUTO: 84.2 FL (ref 79–97)
MONOCYTES # BLD AUTO: 0.53 10*3/MM3 (ref 0.1–0.9)
MONOCYTES NFR BLD AUTO: 5.9 % (ref 5–12)
NEUTROPHILS NFR BLD AUTO: 5.18 10*3/MM3 (ref 1.7–7)
NEUTROPHILS NFR BLD AUTO: 57.8 % (ref 42.7–76)
NITRITE UR QL STRIP: NEGATIVE
NRBC BLD AUTO-RTO: 0 /100 WBC (ref 0–0.2)
PH UR STRIP.AUTO: 6 [PH] (ref 5–8)
PLATELET # BLD AUTO: 258 10*3/MM3 (ref 140–450)
PMV BLD AUTO: 10.8 FL (ref 6–12)
POTASSIUM SERPL-SCNC: 4.5 MMOL/L (ref 3.5–5.2)
PROT SERPL-MCNC: 7.9 G/DL (ref 6–8.5)
PROT UR QL STRIP: NEGATIVE
RBC # BLD AUTO: 4.76 10*6/MM3 (ref 3.77–5.28)
RBC # UR STRIP: ABNORMAL /HPF
REF LAB TEST METHOD: ABNORMAL
RH BLD: POSITIVE
SODIUM SERPL-SCNC: 139 MMOL/L (ref 136–145)
SP GR UR STRIP: 1.01 (ref 1–1.03)
SQUAMOUS #/AREA URNS HPF: ABNORMAL /HPF
T&S EXPIRATION DATE: NORMAL
UROBILINOGEN UR QL STRIP: ABNORMAL
WBC # UR STRIP: ABNORMAL /HPF
WBC NRBC COR # BLD AUTO: 8.97 10*3/MM3 (ref 3.4–10.8)
WHOLE BLOOD HOLD COAG: NORMAL

## 2025-02-20 PROCEDURE — 93005 ELECTROCARDIOGRAM TRACING: CPT

## 2025-02-20 PROCEDURE — 76801 OB US < 14 WKS SINGLE FETUS: CPT

## 2025-02-20 PROCEDURE — 84702 CHORIONIC GONADOTROPIN TEST: CPT | Performed by: NURSE PRACTITIONER

## 2025-02-20 PROCEDURE — 80053 COMPREHEN METABOLIC PANEL: CPT | Performed by: NURSE PRACTITIONER

## 2025-02-20 PROCEDURE — 85025 COMPLETE CBC W/AUTO DIFF WBC: CPT | Performed by: NURSE PRACTITIONER

## 2025-02-20 PROCEDURE — 86901 BLOOD TYPING SEROLOGIC RH(D): CPT | Performed by: NURSE PRACTITIONER

## 2025-02-20 PROCEDURE — 99284 EMERGENCY DEPT VISIT MOD MDM: CPT

## 2025-02-20 PROCEDURE — 81001 URINALYSIS AUTO W/SCOPE: CPT | Performed by: NURSE PRACTITIONER

## 2025-02-20 PROCEDURE — 93976 VASCULAR STUDY: CPT

## 2025-02-20 PROCEDURE — 86850 RBC ANTIBODY SCREEN: CPT | Performed by: NURSE PRACTITIONER

## 2025-02-20 PROCEDURE — 70450 CT HEAD/BRAIN W/O DYE: CPT

## 2025-02-20 PROCEDURE — 86900 BLOOD TYPING SEROLOGIC ABO: CPT | Performed by: NURSE PRACTITIONER

## 2025-02-20 PROCEDURE — 76817 TRANSVAGINAL US OBSTETRIC: CPT

## 2025-02-20 RX ORDER — SODIUM CHLORIDE 0.9 % (FLUSH) 0.9 %
10 SYRINGE (ML) INJECTION AS NEEDED
Status: DISCONTINUED | OUTPATIENT
Start: 2025-02-20 | End: 2025-02-20 | Stop reason: HOSPADM

## 2025-02-20 RX ORDER — CLONAZEPAM 0.5 MG/1
1 TABLET ORAL ONCE
Status: COMPLETED | OUTPATIENT
Start: 2025-02-20 | End: 2025-02-20

## 2025-02-20 RX ADMIN — CLONAZEPAM 1 MG: 0.5 TABLET ORAL at 10:31

## 2025-02-20 NOTE — ED PROVIDER NOTES
Subjective   Chief Complaint   Patient presents with    Syncope     Pt had syncopal episode at work, pt was sitting and passed out. Pt did fall to ground and struck back of head.  Pt states she had miscarriage yesterday and is bleeding a lot.       History of Present Illness  Patient is a 29-year-old female presents the ED with a evaluation of a syncopal episode.  Patient reports that she had a syncopal episode today which was at work.  She does report she hit her head.  Patient states that she has been going through miscarriage, she reports that she was seen by her OBs office, there was no heartbeat noted, she was scheduled to follow back up in the office for medication or D&C tomorrow.  Patient reports she thinks that she is passed everything at this point.  She reports has been having intermittent gushes of blood.    She does report she is orthostatic hypotension at baseline secondary to 160 pound weight loss.    Patient reports she is extremely anxious, she previously stopped her clonazepam secondary to being pregnant, she did have a dose yesterday after she found that she was miscarrying.  Review of Systems    Past Medical History:   Diagnosis Date    ADHD (attention deficit hyperactivity disorder)     Anemia     Anxiety     Clotting disorder     DDD (degenerative disc disease), lumbar     Depression     Hypertension     Kidney stone     Obesity 2012    Panic disorder     PCOS (polycystic ovarian syndrome)     Pre-diabetes     Vitamin D deficiency        No Known Allergies    Past Surgical History:   Procedure Laterality Date    BARIATRIC SURGERY       SECTION      CYSTOSCOPY W/ LASER LITHOTRIPSY      ENDOMETRIAL BIOPSY      ENDOSCOPY N/A 2023    Procedure: ESOPHAGOGASTRODUODENOSCOPY with biopsy x1 area;  Surgeon: Cari Smith MD;  Location: [facility] ENDOSCOPY;  Service: General;  Laterality: N/A;  Post- small hiatal hernia    GASTRIC SLEEVE LAPAROSCOPIC N/A 2023     Procedure: GASTRIC SLEEVE LAPAROSCOPIC WITH DAVINCI ROBOT; hiatal hernia repair;  Surgeon: Cari Smith MD;  Location: [facility] MAIN OR;  Service: Robotics - DaVinci;  Laterality: N/A;    HERNIA REPAIR      TONSILLECTOMY         Family History   Problem Relation Age of Onset    Hypertension Mother     Obesity Mother     Sleep apnea Mother     Arthritis Mother     Obesity Maternal Grandmother     Hypertension Maternal Grandmother     Sleep apnea Maternal Grandmother     Alcohol abuse Maternal Grandmother     Depression Maternal Grandmother     Diabetes Maternal Grandmother     Mental illness Maternal Grandmother        Social History     Socioeconomic History    Marital status:    Tobacco Use    Smoking status: Former     Current packs/day: 0.00     Average packs/day: 0.5 packs/day for 12.9 years (6.5 ttl pk-yrs)     Types: Cigarettes     Start date: 2008     Quit date:      Years since quittin.1     Passive exposure: Past    Smokeless tobacco: Never   Vaping Use    Vaping status: Every Day    Last attempt to quit: 2023    Devices: Disposable    Passive vaping exposure: Yes   Substance and Sexual Activity    Alcohol use: Not Currently     Comment: social    Drug use: No    Sexual activity: Yes     Partners: Male     Birth control/protection: Condom           Objective   Physical Exam  Vitals and nursing note reviewed.   Constitutional:       Appearance: Normal appearance. She is not toxic-appearing.   HENT:      Head: Normocephalic and atraumatic.      Nose: Nose normal.      Mouth/Throat:      Mouth: Mucous membranes are moist.      Pharynx: Oropharynx is clear.   Eyes:      Extraocular Movements: Extraocular movements intact.      Conjunctiva/sclera: Conjunctivae normal.      Pupils: Pupils are equal, round, and reactive to light.   Cardiovascular:      Rate and Rhythm: Normal rate and regular rhythm.      Heart sounds: Normal heart sounds.   Pulmonary:      Effort: Pulmonary effort is  normal.      Breath sounds: Normal breath sounds.   Abdominal:      General: Bowel sounds are normal.      Palpations: Abdomen is soft.      Tenderness: There is no abdominal tenderness. There is no guarding or rebound.   Musculoskeletal:         General: Normal range of motion.      Cervical back: Normal range of motion and neck supple.   Skin:     General: Skin is warm and dry.      Capillary Refill: Capillary refill takes less than 2 seconds.   Neurological:      General: No focal deficit present.      Mental Status: She is alert and oriented to person, place, and time.   Psychiatric:         Mood and Affect: Mood is anxious.         Procedures           ED Course  ED Course as of 02/20/25 1824   Thu Feb 20, 2025   1251 Hemoglobin: 13.1 [LB]   1326 Awaiting ob call back [LB]   1342 Discussed with Dr. Ag.  [LB]   1349 Pt has no bleeding at this time. She is comfortable with discharge.  [LB]      ED Course User Index  [LB] Karoline Evans, CHAUNCEY                                                       Medical Decision Making  Problems Addressed:  Syncope, unspecified syncope type: complicated acute illness or injury  Vaginal bleeding: complicated acute illness or injury    Amount and/or Complexity of Data Reviewed  Labs: ordered. Decision-making details documented in ED Course.  Radiology: ordered.    Risk  Prescription drug management.    Appropriate PPE worn during patient interactions.    Chart Review: Quantitative hCG 1/21/2025 3861    EKG interpreted independently per ED attending physican Dr. Nathan -sinus rhythm rate of 85.  Compared to previous 8/28/2024.  , QTc 434.    Pt was Placed on appropriate monitoring.  Differential diagnoses considered for patient presentation, this list is not all inclusive of diagnoses considered: Electrolyte derangement, anemia, vasovagal episode  Patient presents to the ED for the above complaint, underwent the above, exam and workup.  Patient's quant is 2002  and 33 which is decreasing.  CBC CMP unremarkable.  UA shows no signs of infection patient reports her bleeding has slowed.  I discussed with OB/GYN her ultrasound, given patient reports bleeding is better, she is 1 to keep her follow-up tomorrow with Dr. Ahumada in the office to discuss D&C versus medication.    She denies any further episodes of syncope.  She reports she feels well to go home.    Considertion was given for admission, however patient has reassuring exam and workup in the ed and appears appropriate for discharge home and continued outpatient care.     We discussed my findings, plan of care, discharge instructions, the importance of follow up with their PCP/ and or specialist for repeat evaluation and to discuss any abnormal findings in labs or imaging that warrant further outpatient evaluation. We discussed that although a definitive diagnosis is not always found in the ED, it is believed emergent conditions have been ruled out, and patient is safe for discharge at this time.  We discussed return precautions for the emergency department.  Patient verbalizes understandings, and agrees with current plan of care.      Note Disclaimer: At [facility], we believe that sharing information builds trust and better relationships. You are receiving this note because you recently visited [facility]. It is possible you will see health information before a provider has talked with you about it. This kind of information can be easy to misunderstand. To help you fully understand what it means for your health, we urge you to discuss this note with your provider  Note dictated utilizing Dragon Dictation.          Final diagnoses:   Syncope, unspecified syncope type   Vaginal bleeding       ED Disposition  ED Disposition       ED Disposition   Discharge    Condition   Stable    Comment   --               Jade Whitlock, APRN  [facility] [id number]  [phone]          [facility]  [facility] EMERGENCY DEPARTMENT  [facility] [facility] [address] [phone]             Medication List      No changes were made to your prescriptions during this visit.            Karoline Evans, APRN  02/20/25 1828

## 2025-02-20 NOTE — DISCHARGE INSTRUCTIONS
Your appointment in the OB office tomorrow  Return to ED for new or worsening symptoms: Bleeding more than 1 pad per hour, any further episodes of syncope, fever or chills

## 2025-02-20 NOTE — ED NOTES
RN clarified with provider before giving medication. Provider stated she was okay o have medication due to miscarriage and d/c scheduled for tomorrow.

## 2025-02-21 LAB
QT INTERVAL: 364 MS
QTC INTERVAL: 434 MS

## 2025-03-27 DIAGNOSIS — F40.01 PANIC DISORDER WITH AGORAPHOBIA: Chronic | ICD-10-CM

## 2025-03-27 DIAGNOSIS — F41.1 GENERALIZED ANXIETY DISORDER: Chronic | ICD-10-CM

## 2025-03-27 DIAGNOSIS — F43.10 POSTTRAUMATIC STRESS DISORDER: Chronic | ICD-10-CM

## 2025-03-27 RX ORDER — SERTRALINE HYDROCHLORIDE 100 MG/1
100 TABLET, FILM COATED ORAL DAILY
Qty: 30 TABLET | Refills: 3 | Status: SHIPPED | OUTPATIENT
Start: 2025-03-27 | End: 2026-03-27

## 2025-03-27 NOTE — TELEPHONE ENCOUNTER
Rx Refill Note  Requested Prescriptions     Pending Prescriptions Disp Refills    sertraline (Zoloft) 100 MG tablet 30 tablet 3     Sig: Take 1 tablet by mouth Daily.      Last office visit with prescribing clinician: 8/8/2024   Last telemedicine visit with prescribing clinician: 11/7/2024   Next office visit with prescribing clinician: 6/17/2025   Telemedicine with Sheridan Adams MD (11/07/2024)                       Would you like a call back once the refill request has been completed: [] Yes [] No    If the office needs to give you a call back, can they leave a voicemail: [] Yes [] No    Charlene Virk  03/27/25, 09:59 EDT

## (undated) DEVICE — Device: Brand: STANDARD BOUGIE, 38FR

## (undated) DEVICE — REDUCER: Brand: ENDOWRIST

## (undated) DEVICE — SYR LUERLOK 50ML

## (undated) DEVICE — ARM DRAPE

## (undated) DEVICE — DECANTER: Brand: UNBRANDED

## (undated) DEVICE — SOL IRRIG NACL 1000ML

## (undated) DEVICE — PASS SUT PRO BARIATRIC XL W/TROC SWABS

## (undated) DEVICE — SYRINGE,TOOMEY,IRRIGATION,70CC,STERILE: Brand: MEDLINE

## (undated) DEVICE — VISIGI 3D®  CALIBRATION SYSTEM  SIZE 40FR STD W/ BULB: Brand: BOEHRINGER® VISIGI 3D™ SLEEVE GASTRECTOMY CALIBRATION SYSTEM, SIZE 40FR W/BULB

## (undated) DEVICE — LAPAROVUE VISIBILITY SYSTEM LAPAROSCOPIC SOLUTIONS: Brand: LAPAROVUE

## (undated) DEVICE — ST TBG AIRSEAL BIF FLTR W/ACT/CHARCOAL/FLTR

## (undated) DEVICE — VESSEL SEALER EXTEND: Brand: ENDOWRIST

## (undated) DEVICE — GAUZE,SPONGE,4"X4",16PLY,XRAY,STRL,LF: Brand: MEDLINE

## (undated) DEVICE — PK BARIATRIC 50

## (undated) DEVICE — NEEDLE, QUINCKE, 20GX3.5": Brand: MEDLINE

## (undated) DEVICE — STAPLER 60: Brand: SUREFORM

## (undated) DEVICE — MAT PREVALON MOBL TRANSFR AIR W/PAD REPROC 39X81IN

## (undated) DEVICE — TROC BLADLES AIRSEAL/OPTI THRD 8X120MM 1P/U

## (undated) DEVICE — GOWN,REINF,POLY,ECL,PP SLV,XL,XLONG: Brand: MEDLINE

## (undated) DEVICE — 450 ML BOTTLE OF 0.05% CHLORHEXIDINE GLUCONATE IN 99.95% STERILE WATER FOR IRRIGATION, USP AND APPLICATOR.: Brand: IRRISEPT ANTIMICROBIAL WOUND LAVAGE

## (undated) DEVICE — ENDOPATH XCEL WITH OPTIVIEW TECHNOLOGY BLADELESS TROCARS WITH STABILITY SLEEVES: Brand: ENDOPATH XCEL OPTIVIEW

## (undated) DEVICE — CANNULA SEAL

## (undated) DEVICE — BLADELESS OBTURATOR: Brand: WECK VISTA

## (undated) DEVICE — SEAL

## (undated) DEVICE — ORG INST STRIP/TS ADHS 2X10IN YEL STRL

## (undated) DEVICE — SUT VIC 0/0 UR6 27IN DYED J603H

## (undated) DEVICE — COVER,MAYO STAND,STERILE: Brand: MEDLINE

## (undated) DEVICE — GLV SURG SIGNATURE ESSENTIAL PF LTX SZ7.5

## (undated) DEVICE — KT SURG TURNOVER 050

## (undated) DEVICE — CVR HNDL LT SURG ACCSSRY BLU STRL

## (undated) DEVICE — PAD, GROUNDING, UNIVERSAL, SPLIT, 9': Brand: MEDLINE

## (undated) DEVICE — SLV SCD CALF HEMOFORCE DVT THERP REPROC MD

## (undated) DEVICE — LAPAROSCOPIC DISSECTOR: Brand: DEROYAL

## (undated) DEVICE — COLUMN DRAPE